# Patient Record
Sex: FEMALE | Race: WHITE | Employment: OTHER | URBAN - METROPOLITAN AREA
[De-identification: names, ages, dates, MRNs, and addresses within clinical notes are randomized per-mention and may not be internally consistent; named-entity substitution may affect disease eponyms.]

---

## 2020-03-09 ENCOUNTER — OP HISTORICAL/CONVERTED ENCOUNTER (OUTPATIENT)
Dept: OTHER | Age: 70
End: 2020-03-09

## 2020-06-15 ENCOUNTER — HOSPITAL ENCOUNTER (OUTPATIENT)
Dept: VASCULAR SURGERY | Age: 70
Discharge: HOME | End: 2020-06-15
Attending: FAMILY MEDICINE
Payer: MEDICARE

## 2020-06-15 ENCOUNTER — HOSPITAL ENCOUNTER (OUTPATIENT)
Dept: MRI IMAGING | Age: 70
Discharge: HOME | End: 2020-06-15
Attending: FAMILY MEDICINE
Payer: MEDICARE

## 2020-06-15 VITALS — WEIGHT: 152 LBS

## 2020-06-15 DIAGNOSIS — I63.89 OTHER CEREBRAL INFARCTION (HCC): ICD-10-CM

## 2020-06-15 PROCEDURE — 93880 EXTRACRANIAL BILAT STUDY: CPT

## 2020-06-15 PROCEDURE — A9575 INJ GADOTERATE MEGLUMI 0.1ML: HCPCS | Performed by: RADIOLOGY

## 2020-06-15 PROCEDURE — 70553 MRI BRAIN STEM W/O & W/DYE: CPT

## 2020-06-15 PROCEDURE — 74011250636 HC RX REV CODE- 250/636: Performed by: RADIOLOGY

## 2020-06-15 RX ORDER — GADOTERATE MEGLUMINE 376.9 MG/ML
13 INJECTION INTRAVENOUS
Status: COMPLETED | OUTPATIENT
Start: 2020-06-15 | End: 2020-06-15

## 2020-06-15 RX ADMIN — GADOTERATE MEGLUMINE 13 ML: 376.9 INJECTION INTRAVENOUS at 14:12

## 2020-06-16 LAB
LEFT CCA DIST DIAS: 23 CM/S
LEFT CCA DIST SYS: 102 CM/S
LEFT CCA PROX DIAS: 21.1 CM/S
LEFT CCA PROX SYS: 108.5 CM/S
LEFT ECA DIAS: 9.06 CM/S
LEFT ECA SYS: 69.5 CM/S
LEFT ICA DIST DIAS: 24.1 CM/S
LEFT ICA DIST SYS: 72.5 CM/S
LEFT ICA MID DIAS: 30 CM/S
LEFT ICA MID SYS: 88.8 CM/S
LEFT ICA PROX DIAS: 18.5 CM/S
LEFT ICA PROX SYS: 105.7 CM/S
LEFT ICA/CCA SYS: 1.04
LEFT SUBCLAVIAN DIAS: 0 CM/S
LEFT SUBCLAVIAN SYS: 126.2 CM/S
LEFT VERTEBRAL DIAS: 0 CM/S
LEFT VERTEBRAL SYS: 43.4 CM/S
RIGHT CCA DIST DIAS: 19.2 CM/S
RIGHT CCA DIST SYS: 101.6 CM/S
RIGHT CCA PROX DIAS: 21.5 CM/S
RIGHT CCA PROX SYS: 86.5 CM/S
RIGHT ECA DIAS: 7.91 CM/S
RIGHT ECA SYS: 104.8 CM/S
RIGHT ICA DIST DIAS: 33.3 CM/S
RIGHT ICA DIST SYS: 86.8 CM/S
RIGHT ICA MID DIAS: 20 CM/S
RIGHT ICA MID SYS: 53 CM/S
RIGHT ICA PROX DIAS: 17.6 CM/S
RIGHT ICA PROX SYS: 80.6 CM/S
RIGHT ICA/CCA SYS: 0.9
RIGHT SUBCLAVIAN DIAS: 0 CM/S
RIGHT SUBCLAVIAN SYS: 118.1 CM/S
RIGHT VERTEBRAL DIAS: 15.99 CM/S
RIGHT VERTEBRAL SYS: 58 CM/S

## 2021-08-03 ENCOUNTER — TRANSCRIBE ORDER (OUTPATIENT)
Dept: SCHEDULING | Age: 71
End: 2021-08-03

## 2021-08-03 DIAGNOSIS — E78.5 HYPERLIPEMIA: Primary | ICD-10-CM

## 2021-08-03 DIAGNOSIS — R79.89 HYPOURICEMIA: Primary | ICD-10-CM

## 2021-08-03 DIAGNOSIS — N18.31 CHRONIC KIDNEY DISEASE, STAGE 3A (HCC): Primary | ICD-10-CM

## 2021-08-12 ENCOUNTER — HOSPITAL ENCOUNTER (OUTPATIENT)
Dept: ULTRASOUND IMAGING | Age: 71
Discharge: HOME | End: 2021-08-12
Payer: MEDICARE

## 2021-08-12 DIAGNOSIS — N18.31 CHRONIC KIDNEY DISEASE, STAGE 3A (HCC): ICD-10-CM

## 2021-08-12 PROCEDURE — 76770 US EXAM ABDO BACK WALL COMP: CPT

## 2021-08-18 ENCOUNTER — TRANSCRIBE ORDER (OUTPATIENT)
Dept: REGISTRATION | Age: 71
End: 2021-08-18

## 2021-08-18 ENCOUNTER — HOSPITAL ENCOUNTER (OUTPATIENT)
Dept: GENERAL RADIOLOGY | Age: 71
Discharge: HOME | End: 2021-08-18
Payer: MEDICARE

## 2021-08-18 DIAGNOSIS — R06.02 SHORTNESS OF BREATH: Primary | ICD-10-CM

## 2021-08-18 DIAGNOSIS — R06.02 SHORTNESS OF BREATH: ICD-10-CM

## 2021-08-18 PROCEDURE — 71046 X-RAY EXAM CHEST 2 VIEWS: CPT

## 2021-08-19 ENCOUNTER — TRANSCRIBE ORDER (OUTPATIENT)
Dept: SCHEDULING | Age: 71
End: 2021-08-19

## 2021-08-19 DIAGNOSIS — R06.02 SHORTNESS OF BREATH: ICD-10-CM

## 2021-08-19 DIAGNOSIS — R06.2 WHEEZING: Primary | ICD-10-CM

## 2021-08-25 ENCOUNTER — TRANSCRIBE ORDER (OUTPATIENT)
Dept: SCHEDULING | Age: 71
End: 2021-08-25

## 2021-08-25 DIAGNOSIS — R91.8 LUNG MASS: Primary | ICD-10-CM

## 2021-08-26 ENCOUNTER — HOSPITAL ENCOUNTER (OUTPATIENT)
Dept: PULMONOLOGY | Age: 71
Discharge: HOME | End: 2021-08-26
Attending: NURSE PRACTITIONER
Payer: MEDICARE

## 2021-08-26 VITALS — OXYGEN SATURATION: 94 %

## 2021-08-26 DIAGNOSIS — R06.2 WHEEZING: ICD-10-CM

## 2021-08-26 DIAGNOSIS — R06.02 SHORTNESS OF BREATH: ICD-10-CM

## 2021-08-26 PROCEDURE — 74011250637 HC RX REV CODE- 250/637: Performed by: INTERNAL MEDICINE

## 2021-08-26 PROCEDURE — 94664 DEMO&/EVAL PT USE INHALER: CPT

## 2021-08-26 PROCEDURE — 94760 N-INVAS EAR/PLS OXIMETRY 1: CPT

## 2021-08-26 PROCEDURE — 94729 DIFFUSING CAPACITY: CPT

## 2021-08-26 PROCEDURE — 94640 AIRWAY INHALATION TREATMENT: CPT

## 2021-08-26 PROCEDURE — 94060 EVALUATION OF WHEEZING: CPT

## 2021-08-26 RX ORDER — ALBUTEROL SULFATE 90 UG/1
4 AEROSOL, METERED RESPIRATORY (INHALATION)
Status: COMPLETED | OUTPATIENT
Start: 2021-08-26 | End: 2021-08-26

## 2021-08-26 RX ADMIN — ALBUTEROL SULFATE 4 PUFF: 108 INHALANT RESPIRATORY (INHALATION) at 11:20

## 2021-08-26 NOTE — PROCEDURES
[facility] 80  PULMONARY FUNCTION TEST    Name:  Tere Velarde  MR#:  [id number]  :  1950  ACCOUNT #:  [de-identified]  DATE OF SERVICE:  2021    Spirometry reveals an FEV1-to-FVC ratio that is normal at 77% predicted. FVC and FEV1 are within normal limits with an FVC of 2.03 L and 90% predicted; FEV1 of 1.56 L and 100% predicted. This spirometry is not consistent with an obstructive or restrictive defect. Diffusion capacity is mildly reduced at 57% and corrects when taking into consideration alveolar volume to 94% predicted.       Nhung Goodwin MD      KP/NICOLAS_TED_I/  D:  2021 17:18  T:  2021 18:21  JOB #:  [id number]

## 2021-09-09 ENCOUNTER — HOSPITAL ENCOUNTER (OUTPATIENT)
Dept: CT IMAGING | Age: 71
Discharge: HOME | End: 2021-09-09
Payer: MEDICARE

## 2021-09-09 ENCOUNTER — TRANSCRIBE ORDER (OUTPATIENT)
Dept: REGISTRATION | Age: 71
End: 2021-09-09

## 2021-09-09 ENCOUNTER — HOSPITAL ENCOUNTER (OUTPATIENT)
Dept: LAB | Age: 71
Discharge: HOME | End: 2021-09-09
Payer: MEDICARE

## 2021-09-09 DIAGNOSIS — R91.8 LUNG MASS: Primary | ICD-10-CM

## 2021-09-09 DIAGNOSIS — R91.8 LUNG MASS: ICD-10-CM

## 2021-09-09 LAB — CREAT SERPL-MCNC: 1.37 MG/DL (ref 0.55–1.02)

## 2021-09-09 PROCEDURE — 82565 ASSAY OF CREATININE: CPT

## 2021-09-09 PROCEDURE — 74011000636 HC RX REV CODE- 636: Performed by: NURSE PRACTITIONER

## 2021-09-09 PROCEDURE — 71270 CT THORAX DX C-/C+: CPT

## 2021-09-09 PROCEDURE — 36415 COLL VENOUS BLD VENIPUNCTURE: CPT

## 2021-09-09 RX ADMIN — IOPAMIDOL 100 ML: 755 INJECTION, SOLUTION INTRAVENOUS at 13:44

## 2021-10-17 ENCOUNTER — HOSPITAL ENCOUNTER (OUTPATIENT)
Dept: PREADMISSION TESTING | Age: 71
Discharge: HOME | End: 2021-10-17
Payer: MEDICARE

## 2021-10-17 LAB — SARS-COV-2, COV2: NORMAL

## 2021-10-17 PROCEDURE — U0005 INFEC AGEN DETEC AMPLI PROBE: HCPCS

## 2021-10-18 LAB
SARS-COV-2, XPLCVT: NOT DETECTED
SOURCE, COVRS: NORMAL

## 2021-10-21 ENCOUNTER — APPOINTMENT (OUTPATIENT)
Dept: ENDOSCOPY | Age: 71
End: 2021-10-21
Attending: INTERNAL MEDICINE
Payer: MEDICARE

## 2021-10-21 ENCOUNTER — HOSPITAL ENCOUNTER (OUTPATIENT)
Age: 71
Setting detail: OUTPATIENT SURGERY
Discharge: HOME | End: 2021-10-21
Attending: INTERNAL MEDICINE | Admitting: INTERNAL MEDICINE
Payer: MEDICARE

## 2021-10-21 ENCOUNTER — ANESTHESIA EVENT (OUTPATIENT)
Dept: ENDOSCOPY | Age: 71
End: 2021-10-21
Payer: MEDICARE

## 2021-10-21 ENCOUNTER — ANESTHESIA (OUTPATIENT)
Dept: ENDOSCOPY | Age: 71
End: 2021-10-21
Payer: MEDICARE

## 2021-10-21 VITALS
SYSTOLIC BLOOD PRESSURE: 121 MMHG | DIASTOLIC BLOOD PRESSURE: 61 MMHG | OXYGEN SATURATION: 96 % | BODY MASS INDEX: 34.36 KG/M2 | RESPIRATION RATE: 16 BRPM | WEIGHT: 175 LBS | HEIGHT: 60 IN | TEMPERATURE: 97.6 F | HEART RATE: 70 BPM

## 2021-10-21 PROCEDURE — 77030037041 HC FCPS HOT BIOP ENDOSC RAD JAW DISP BSC -B: Performed by: INTERNAL MEDICINE

## 2021-10-21 PROCEDURE — 88305 TISSUE EXAM BY PATHOLOGIST: CPT

## 2021-10-21 PROCEDURE — 74011250636 HC RX REV CODE- 250/636: Performed by: ANESTHESIOLOGY

## 2021-10-21 PROCEDURE — 77030021593 HC FCPS BIOP ENDOSC BSC -A: Performed by: INTERNAL MEDICINE

## 2021-10-21 PROCEDURE — 76040000007: Performed by: INTERNAL MEDICINE

## 2021-10-21 PROCEDURE — 2709999900 HC NON-CHARGEABLE SUPPLY: Performed by: INTERNAL MEDICINE

## 2021-10-21 PROCEDURE — 76060000032 HC ANESTHESIA 0.5 TO 1 HR: Performed by: INTERNAL MEDICINE

## 2021-10-21 PROCEDURE — 74011250636 HC RX REV CODE- 250/636: Performed by: INTERNAL MEDICINE

## 2021-10-21 RX ORDER — SODIUM CHLORIDE, SODIUM LACTATE, POTASSIUM CHLORIDE, CALCIUM CHLORIDE 600; 310; 30; 20 MG/100ML; MG/100ML; MG/100ML; MG/100ML
INJECTION, SOLUTION INTRAVENOUS
Status: DISCONTINUED | OUTPATIENT
Start: 2021-10-21 | End: 2021-10-21 | Stop reason: HOSPADM

## 2021-10-21 RX ORDER — PROPOFOL 10 MG/ML
INJECTION, EMULSION INTRAVENOUS AS NEEDED
Status: DISCONTINUED | OUTPATIENT
Start: 2021-10-21 | End: 2021-10-21 | Stop reason: HOSPADM

## 2021-10-21 RX ORDER — SODIUM CHLORIDE, SODIUM LACTATE, POTASSIUM CHLORIDE, CALCIUM CHLORIDE 600; 310; 30; 20 MG/100ML; MG/100ML; MG/100ML; MG/100ML
75 INJECTION, SOLUTION INTRAVENOUS CONTINUOUS
Status: DISCONTINUED | OUTPATIENT
Start: 2021-10-21 | End: 2021-10-21 | Stop reason: HOSPADM

## 2021-10-21 RX ORDER — ASPIRIN 81 MG/1
81 TABLET ORAL DAILY
COMMUNITY

## 2021-10-21 RX ORDER — SODIUM CHLORIDE 9 MG/ML
25 INJECTION, SOLUTION INTRAVENOUS CONTINUOUS
Status: DISCONTINUED | OUTPATIENT
Start: 2021-10-21 | End: 2021-10-21 | Stop reason: HOSPADM

## 2021-10-21 RX ORDER — PROPOFOL 10 MG/ML
INJECTION, EMULSION INTRAVENOUS
Status: COMPLETED
Start: 2021-10-21 | End: 2021-10-21

## 2021-10-21 RX ORDER — ESCITALOPRAM OXALATE 20 MG/1
20 TABLET ORAL DAILY
COMMUNITY

## 2021-10-21 RX ORDER — TOFACITINIB 11 MG/1
1 TABLET, FILM COATED, EXTENDED RELEASE ORAL DAILY
COMMUNITY

## 2021-10-21 RX ADMIN — PROPOFOL 60 MG: 10 INJECTION, EMULSION INTRAVENOUS at 11:09

## 2021-10-21 RX ADMIN — SODIUM CHLORIDE, POTASSIUM CHLORIDE, SODIUM LACTATE AND CALCIUM CHLORIDE: 600; 310; 30; 20 INJECTION, SOLUTION INTRAVENOUS at 11:06

## 2021-10-21 RX ADMIN — SODIUM CHLORIDE 25 ML/HR: 9 INJECTION, SOLUTION INTRAVENOUS at 10:41

## 2021-10-21 RX ADMIN — PROPOFOL 50 MG: 10 INJECTION, EMULSION INTRAVENOUS at 11:08

## 2021-10-21 NOTE — ANESTHESIA POSTPROCEDURE EVALUATION
Procedure(s):  UPPER ENDOSCOPY (EGD). MAC, total IV anesthesia    Anesthesia Post Evaluation        Patient location during evaluation: bedside (Endoscopy suite)  Patient participation: complete - patient cannot participate  Level of consciousness: sleepy but conscious  Pain score: 0  Pain management: adequate  Airway patency: patent  Anesthetic complications: no  Cardiovascular status: acceptable  Respiratory status: acceptable and nasal cannula  Hydration status: acceptable  Comments: This patient remained on the stretcher. The patient was handed off to the endoscopy nursing team.  All questions regarding pre-, intra-, and postoperative care were answered.   Post anesthesia nausea and vomiting:  none      INITIAL Post-op Vital signs:   Vitals Value Taken Time   /73 10/21/21 1115   Temp     Pulse 65 10/21/21 1115   Resp 17 10/21/21 1115   SpO2 97 % 10/21/21 1115

## 2021-10-21 NOTE — PERIOP NOTES
Patient alert and oriented x 4 with respirations even and unlabored on RA. Patient discharged home per physician's order. Patient verbalizes understanding of discharge instructions. Patient transported via wheelchair to front hospital entrance with daughter present to transport patient via private vehicle.

## 2021-10-21 NOTE — DISCHARGE INSTRUCTIONS
Strict antireflux diet. Antireflux measures:  Small meals, no food after 7pm, walk 15 minutes after meals, avoid tobacco and alcohol. Continue present PPI therapy.

## 2021-11-11 ENCOUNTER — TRANSCRIBE ORDER (OUTPATIENT)
Dept: SCHEDULING | Age: 71
End: 2021-11-11

## 2021-11-11 DIAGNOSIS — Z12.31 SCREENING MAMMOGRAM FOR BREAST CANCER: Primary | ICD-10-CM

## 2021-11-12 ENCOUNTER — TRANSCRIBE ORDER (OUTPATIENT)
Dept: SCHEDULING | Age: 71
End: 2021-11-12

## 2021-11-12 DIAGNOSIS — Z12.31 SCREENING MAMMOGRAM FOR HIGH-RISK PATIENT: Primary | ICD-10-CM

## 2021-12-28 ENCOUNTER — HOSPITAL ENCOUNTER (OUTPATIENT)
Dept: MAMMOGRAPHY | Age: 71
Discharge: HOME | End: 2021-12-28
Payer: MEDICARE

## 2021-12-28 DIAGNOSIS — Z12.31 SCREENING MAMMOGRAM FOR HIGH-RISK PATIENT: ICD-10-CM

## 2021-12-28 PROCEDURE — 77067 SCR MAMMO BI INCL CAD: CPT

## 2022-03-21 ENCOUNTER — TRANSCRIBE ORDER (OUTPATIENT)
Dept: SCHEDULING | Age: 72
End: 2022-03-21

## 2022-03-21 DIAGNOSIS — R06.02 SHORTNESS OF BREATH: Primary | ICD-10-CM

## 2022-04-01 ENCOUNTER — HOSPITAL ENCOUNTER (OUTPATIENT)
Dept: NUCLEAR MEDICINE | Age: 72
Discharge: HOME | End: 2022-04-01
Attending: INTERNAL MEDICINE
Payer: MEDICARE

## 2022-04-01 ENCOUNTER — HOSPITAL ENCOUNTER (OUTPATIENT)
Dept: NON INVASIVE DIAGNOSTICS | Age: 72
Discharge: HOME | End: 2022-04-01
Attending: INTERNAL MEDICINE
Payer: MEDICARE

## 2022-04-01 VITALS
HEIGHT: 59 IN | WEIGHT: 175 LBS | BODY MASS INDEX: 35.28 KG/M2 | DIASTOLIC BLOOD PRESSURE: 67 MMHG | SYSTOLIC BLOOD PRESSURE: 118 MMHG

## 2022-04-01 DIAGNOSIS — R06.02 SHORTNESS OF BREATH: ICD-10-CM

## 2022-04-01 LAB
STRESS BASELINE DIAS BP: 67 MMHG
STRESS BASELINE HR: 67 BPM
STRESS BASELINE ST DEPRESSION: 0 MM
STRESS BASELINE SYS BP: 118 MMHG
STRESS PERCENT HR ACHIEVED: 85 %
STRESS POST PEAK HR: 128 BPM
STRESS ST DEPRESSION: 0 MM
STRESS STAGE 1 BP: NORMAL MMHG
STRESS STAGE 1 DURATION: NORMAL MIN:SEC
STRESS STAGE 1 HR: 90 BPM
STRESS STAGE 2 DURATION: NORMAL MIN:SEC
STRESS STAGE 2 HR: 94 BPM
STRESS STAGE 3 BP: NORMAL MMHG
STRESS STAGE 3 DURATION: NORMAL MIN:SEC
STRESS STAGE 3 HR: 104 BPM
STRESS STAGE 4 DURATION: NORMAL MIN:SEC
STRESS STAGE 4 HR: 100 BPM
STRESS STAGE 5 BP: NORMAL MMHG
STRESS STAGE 5 DURATION: NORMAL MIN:SEC
STRESS STAGE 5 HR: 89 BPM
STRESS TARGET HR: 150 BPM

## 2022-04-01 PROCEDURE — 74011250636 HC RX REV CODE- 250/636

## 2022-04-01 PROCEDURE — 93017 CV STRESS TEST TRACING ONLY: CPT

## 2022-04-01 RX ADMIN — REGADENOSON 0.4 MG: 0.08 INJECTION, SOLUTION INTRAVENOUS at 09:35

## 2022-04-18 ENCOUNTER — TRANSCRIBE ORDER (OUTPATIENT)
Dept: REGISTRATION | Age: 72
End: 2022-04-18

## 2022-04-18 ENCOUNTER — HOSPITAL ENCOUNTER (OUTPATIENT)
Dept: GENERAL RADIOLOGY | Age: 72
Discharge: HOME | End: 2022-04-18
Payer: MEDICARE

## 2022-04-18 ENCOUNTER — HOSPITAL ENCOUNTER (OUTPATIENT)
Dept: LAB | Age: 72
Discharge: HOME | End: 2022-04-18
Payer: MEDICARE

## 2022-04-18 DIAGNOSIS — R06.09 CHRONIC DYSPNEA: ICD-10-CM

## 2022-04-18 DIAGNOSIS — I10 ESSENTIAL HYPERTENSION, MALIGNANT: Primary | ICD-10-CM

## 2022-04-18 DIAGNOSIS — R06.09 CHRONIC DYSPNEA: Primary | ICD-10-CM

## 2022-04-18 DIAGNOSIS — I10 ESSENTIAL HYPERTENSION, MALIGNANT: ICD-10-CM

## 2022-04-18 PROCEDURE — 71046 X-RAY EXAM CHEST 2 VIEWS: CPT

## 2022-07-09 NOTE — ANESTHESIA PREPROCEDURE EVALUATION
Relevant Problems   No relevant active problems       Anesthetic History   No history of anesthetic complications            Review of Systems / Medical History  Patient summary reviewed, nursing notes reviewed and pertinent labs reviewed    Pulmonary                   Neuro/Psych         Psychiatric history (depression)     Cardiovascular              Hyperlipidemia         GI/Hepatic/Renal     GERD    Renal disease: CRI       Endo/Other        Obesity and arthritis     Other Findings              Physical Exam    Airway  Mallampati: II  TM Distance: < 4 cm  Neck ROM: normal range of motion   Mouth opening: Normal     Cardiovascular    Rhythm: regular  Rate: normal         Dental    Dentition: Lower dentition intact and Upper dentition intact     Pulmonary      Decreased breath sounds           Abdominal  GI exam deferred       Other Findings            Anesthetic Plan    ASA: 2  Anesthesia type: MAC and total IV anesthesia          Induction: Intravenous  Anesthetic plan and risks discussed with: Patient
Patent

## 2022-09-14 ENCOUNTER — TRANSCRIBE ORDER (OUTPATIENT)
Dept: SCHEDULING | Age: 72
End: 2022-09-14

## 2022-09-14 DIAGNOSIS — M54.16 LUMBAR RADICULOPATHY: Primary | ICD-10-CM

## 2022-09-21 ENCOUNTER — HOSPITAL ENCOUNTER (OUTPATIENT)
Dept: MRI IMAGING | Age: 72
Discharge: HOME | End: 2022-09-21
Payer: MEDICARE

## 2022-09-21 DIAGNOSIS — M54.16 LUMBAR RADICULOPATHY: ICD-10-CM

## 2022-09-21 PROCEDURE — 72148 MRI LUMBAR SPINE W/O DYE: CPT

## 2023-02-09 ENCOUNTER — TRANSCRIBE ORDER (OUTPATIENT)
Dept: SCHEDULING | Age: 73
End: 2023-02-09

## 2023-02-09 DIAGNOSIS — Z12.31 SCREENING MAMMOGRAM FOR HIGH-RISK PATIENT: Primary | ICD-10-CM

## 2023-02-15 ENCOUNTER — HOSPITAL ENCOUNTER (OUTPATIENT)
Dept: MAMMOGRAPHY | Age: 73
Discharge: HOME | End: 2023-02-15
Payer: MEDICARE

## 2023-02-15 DIAGNOSIS — Z12.31 SCREENING MAMMOGRAM FOR HIGH-RISK PATIENT: ICD-10-CM

## 2023-02-15 PROCEDURE — 77063 BREAST TOMOSYNTHESIS BI: CPT

## 2024-02-12 ENCOUNTER — TRANSCRIBE ORDERS (OUTPATIENT)
Facility: HOSPITAL | Age: 74
End: 2024-02-12

## 2024-02-12 DIAGNOSIS — R19.00 PELVIC LUMP: Primary | ICD-10-CM

## 2024-02-15 ENCOUNTER — TRANSCRIBE ORDERS (OUTPATIENT)
Facility: HOSPITAL | Age: 74
End: 2024-02-15

## 2024-02-15 DIAGNOSIS — Z12.31 SCREENING MAMMOGRAM FOR BREAST CANCER: Primary | ICD-10-CM

## 2024-02-23 ENCOUNTER — HOSPITAL ENCOUNTER (OUTPATIENT)
Facility: HOSPITAL | Age: 74
End: 2024-02-23
Payer: MEDICARE

## 2024-02-23 VITALS — BODY MASS INDEX: 35.28 KG/M2 | HEIGHT: 59 IN | WEIGHT: 175 LBS

## 2024-02-23 DIAGNOSIS — Z12.31 SCREENING MAMMOGRAM FOR BREAST CANCER: ICD-10-CM

## 2024-02-23 PROCEDURE — 77063 BREAST TOMOSYNTHESIS BI: CPT

## 2024-03-21 ENCOUNTER — HOSPITAL ENCOUNTER (OUTPATIENT)
Facility: HOSPITAL | Age: 74
Discharge: HOME | End: 2024-03-21
Payer: MEDICARE

## 2024-03-21 DIAGNOSIS — M54.16 LUMBAR RADICULOPATHY: ICD-10-CM

## 2024-03-21 PROCEDURE — A9577 INJ MULTIHANCE: HCPCS | Performed by: ORTHOPAEDIC SURGERY

## 2024-03-21 PROCEDURE — 6360000004 HC RX CONTRAST MEDICATION: Performed by: ORTHOPAEDIC SURGERY

## 2024-03-21 PROCEDURE — 72158 MRI LUMBAR SPINE W/O & W/DYE: CPT

## 2024-03-21 RX ADMIN — GADOBENATE DIMEGLUMINE 15 ML: 529 INJECTION, SOLUTION INTRAVENOUS at 09:17

## 2024-04-19 NOTE — PRE-PROCEDURE INSTRUCTIONS
Attempted to complete pat for procedure on 4/22/24. No answer. Detailed voicemail left regarding arrival time of 0915, npo status, prep instructions, and the need for a ride home.

## 2024-04-21 ENCOUNTER — ANESTHESIA EVENT (OUTPATIENT)
Facility: HOSPITAL | Age: 74
End: 2024-04-21
Payer: MEDICARE

## 2024-04-21 NOTE — ANESTHESIA PRE PROCEDURE
Department of Anesthesiology  Preprocedure Note       Name:  Britta Quevedo   Age:  74 y.o.  :  1950                                          MRN:  [id number]         Date:  2024      Surgeon: Surgeon(s):  Julian Pratt MD    Procedure: Procedure(s):  COLONOSCOPY    Medications prior to admission:   Prior to Admission medications    Medication Sig Start Date End Date Taking? Authorizing Provider   aspirin 81 MG EC tablet Take 81 mg by mouth daily    Automatic Reconciliation, Ar   escitalopram (LEXAPRO) 20 MG tablet Take 20 mg by mouth daily    Automatic Reconciliation, Ar   Tofacitinib Citrate ER (XELJANZ XR) 11 MG TB24 Take 1 tablet by mouth daily    Automatic Reconciliation, Ar       Current medications:    No current facility-administered medications for this encounter.     Current Outpatient Medications   Medication Sig Dispense Refill   • aspirin 81 MG EC tablet Take 81 mg by mouth daily     • escitalopram (LEXAPRO) 20 MG tablet Take 20 mg by mouth daily     • Tofacitinib Citrate ER (XELJANZ XR) 11 MG TB24 Take 1 tablet by mouth daily         Allergies:    Allergies   Allergen Reactions   • Meperidine      Other reaction(s): Unknown (comments)   • Sulfa Antibiotics      Other reaction(s): Unknown (comments)       Problem List:  There is no problem list on file for this patient.      Past Medical History:        Diagnosis Date   • Arthritis    • Chronic kidney disease    • Depression    • Hyperlipemia        Past Surgical History:        Procedure Laterality Date   • BACK SURGERY     • HYSTERECTOMY (CERVIX STATUS UNKNOWN)         Social History:    Social History     Tobacco Use   • Smoking status: Never   • Smokeless tobacco: Never   Substance Use Topics   • Alcohol use: Never                                Counseling given: Not Answered      Vital Signs (Current): There were no vitals filed for this visit.                                           BP Readings from Last 3 Encounters:   No data found

## 2024-04-22 ENCOUNTER — APPOINTMENT (OUTPATIENT)
Facility: HOSPITAL | Age: 74
DRG: 330 | End: 2024-04-22
Attending: INTERNAL MEDICINE
Payer: MEDICARE

## 2024-04-22 ENCOUNTER — ANESTHESIA (OUTPATIENT)
Facility: HOSPITAL | Age: 74
End: 2024-04-22
Payer: MEDICARE

## 2024-04-22 ENCOUNTER — HOSPITAL ENCOUNTER (INPATIENT)
Facility: HOSPITAL | Age: 74
LOS: 9 days | Discharge: HOME HEALTH | DRG: 330 | End: 2024-05-01
Attending: INTERNAL MEDICINE | Admitting: FAMILY MEDICINE
Payer: MEDICARE

## 2024-04-22 ENCOUNTER — ANESTHESIA EVENT (OUTPATIENT)
Facility: HOSPITAL | Age: 74
End: 2024-04-22
Payer: MEDICARE

## 2024-04-22 DIAGNOSIS — K66.8 FREE INTRAPERITONEAL AIR: ICD-10-CM

## 2024-04-22 PROBLEM — K63.1 PERFORATION BOWEL (HCC): Status: ACTIVE | Noted: 2024-04-22

## 2024-04-22 PROBLEM — K63.1 BOWEL PERFORATION (HCC): Status: ACTIVE | Noted: 2024-04-22

## 2024-04-22 PROCEDURE — 2500000003 HC RX 250 WO HCPCS: Performed by: NURSE PRACTITIONER

## 2024-04-22 PROCEDURE — 7100000000 HC PACU RECOVERY - FIRST 15 MIN: Performed by: SURGERY

## 2024-04-22 PROCEDURE — 0DN84ZZ RELEASE SMALL INTESTINE, PERCUTANEOUS ENDOSCOPIC APPROACH: ICD-10-PCS | Performed by: SURGERY

## 2024-04-22 PROCEDURE — 3600000004 HC SURGERY LEVEL 4 BASE: Performed by: SURGERY

## 2024-04-22 PROCEDURE — 3700000001 HC ADD 15 MINUTES (ANESTHESIA): Performed by: SURGERY

## 2024-04-22 PROCEDURE — 2580000003 HC RX 258: Performed by: NURSE PRACTITIONER

## 2024-04-22 PROCEDURE — 88307 TISSUE EXAM BY PATHOLOGIST: CPT

## 2024-04-22 PROCEDURE — 2580000003 HC RX 258: Performed by: INTERNAL MEDICINE

## 2024-04-22 PROCEDURE — 0D1M0Z4 BYPASS DESCENDING COLON TO CUTANEOUS, OPEN APPROACH: ICD-10-PCS | Performed by: SURGERY

## 2024-04-22 PROCEDURE — 6360000002 HC RX W HCPCS: Performed by: INTERNAL MEDICINE

## 2024-04-22 PROCEDURE — 74019 RADEX ABDOMEN 2 VIEWS: CPT

## 2024-04-22 PROCEDURE — 2580000003 HC RX 258: Performed by: SURGERY

## 2024-04-22 PROCEDURE — 99222 1ST HOSP IP/OBS MODERATE 55: CPT | Performed by: SURGERY

## 2024-04-22 PROCEDURE — 6360000002 HC RX W HCPCS: Performed by: NURSE PRACTITIONER

## 2024-04-22 PROCEDURE — 3700000001 HC ADD 15 MINUTES (ANESTHESIA): Performed by: INTERNAL MEDICINE

## 2024-04-22 PROCEDURE — 2500000003 HC RX 250 WO HCPCS: Performed by: NURSE ANESTHETIST, CERTIFIED REGISTERED

## 2024-04-22 PROCEDURE — 2709999900 HC NON-CHARGEABLE SUPPLY: Performed by: INTERNAL MEDICINE

## 2024-04-22 PROCEDURE — 2500000003 HC RX 250 WO HCPCS

## 2024-04-22 PROCEDURE — 71045 X-RAY EXAM CHEST 1 VIEW: CPT

## 2024-04-22 PROCEDURE — 2720000010 HC SURG SUPPLY STERILE: Performed by: SURGERY

## 2024-04-22 PROCEDURE — 3600000014 HC SURGERY LEVEL 4 ADDTL 15MIN: Performed by: SURGERY

## 2024-04-22 PROCEDURE — 7100000011 HC PHASE II RECOVERY - ADDTL 15 MIN: Performed by: INTERNAL MEDICINE

## 2024-04-22 PROCEDURE — 3600007502: Performed by: INTERNAL MEDICINE

## 2024-04-22 PROCEDURE — 2500000003 HC RX 250 WO HCPCS: Performed by: SURGERY

## 2024-04-22 PROCEDURE — 2709999900 HC NON-CHARGEABLE SUPPLY: Performed by: SURGERY

## 2024-04-22 PROCEDURE — 0DTN0ZZ RESECTION OF SIGMOID COLON, OPEN APPROACH: ICD-10-PCS | Performed by: SURGERY

## 2024-04-22 PROCEDURE — 1100000000 HC RM PRIVATE

## 2024-04-22 PROCEDURE — 2580000003 HC RX 258

## 2024-04-22 PROCEDURE — 6360000002 HC RX W HCPCS: Performed by: NURSE ANESTHETIST, CERTIFIED REGISTERED

## 2024-04-22 PROCEDURE — 3700000000 HC ANESTHESIA ATTENDED CARE: Performed by: SURGERY

## 2024-04-22 PROCEDURE — 3600007512: Performed by: INTERNAL MEDICINE

## 2024-04-22 PROCEDURE — 7100000010 HC PHASE II RECOVERY - FIRST 15 MIN: Performed by: INTERNAL MEDICINE

## 2024-04-22 PROCEDURE — 6360000002 HC RX W HCPCS

## 2024-04-22 PROCEDURE — 3700000000 HC ANESTHESIA ATTENDED CARE: Performed by: INTERNAL MEDICINE

## 2024-04-22 PROCEDURE — 44143 PARTIAL REMOVAL OF COLON: CPT | Performed by: SURGERY

## 2024-04-22 PROCEDURE — 74176 CT ABD & PELVIS W/O CONTRAST: CPT

## 2024-04-22 PROCEDURE — 7100000001 HC PACU RECOVERY - ADDTL 15 MIN: Performed by: SURGERY

## 2024-04-22 PROCEDURE — 87086 URINE CULTURE/COLONY COUNT: CPT

## 2024-04-22 RX ORDER — PHENYLEPHRINE HCL IN 0.9% NACL 1 MG/10 ML
SYRINGE (ML) INTRAVENOUS PRN
Status: DISCONTINUED | OUTPATIENT
Start: 2024-04-22 | End: 2024-04-22 | Stop reason: SDUPTHER

## 2024-04-22 RX ORDER — SODIUM CHLORIDE 0.9 % (FLUSH) 0.9 %
5-40 SYRINGE (ML) INJECTION EVERY 12 HOURS SCHEDULED
Status: DISCONTINUED | OUTPATIENT
Start: 2024-04-22 | End: 2024-05-01 | Stop reason: HOSPADM

## 2024-04-22 RX ORDER — ONDANSETRON 2 MG/ML
4 INJECTION INTRAMUSCULAR; INTRAVENOUS EVERY 6 HOURS PRN
Status: DISCONTINUED | OUTPATIENT
Start: 2024-04-22 | End: 2024-05-01 | Stop reason: HOSPADM

## 2024-04-22 RX ORDER — SUCCINYLCHOLINE/SOD CL,ISO/PF 200MG/10ML
SYRINGE (ML) INTRAVENOUS PRN
Status: DISCONTINUED | OUTPATIENT
Start: 2024-04-22 | End: 2024-04-22 | Stop reason: SDUPTHER

## 2024-04-22 RX ORDER — SODIUM CHLORIDE, SODIUM LACTATE, POTASSIUM CHLORIDE, CALCIUM CHLORIDE 600; 310; 30; 20 MG/100ML; MG/100ML; MG/100ML; MG/100ML
INJECTION, SOLUTION INTRAVENOUS CONTINUOUS PRN
Status: DISCONTINUED | OUTPATIENT
Start: 2024-04-22 | End: 2024-04-22

## 2024-04-22 RX ORDER — EPHEDRINE SULFATE 50 MG/ML
INJECTION INTRAVENOUS PRN
Status: DISCONTINUED | OUTPATIENT
Start: 2024-04-22 | End: 2024-04-22 | Stop reason: SDUPTHER

## 2024-04-22 RX ORDER — ESCITALOPRAM OXALATE 10 MG/1
20 TABLET ORAL DAILY
Status: DISCONTINUED | OUTPATIENT
Start: 2024-04-23 | End: 2024-05-01 | Stop reason: HOSPADM

## 2024-04-22 RX ORDER — SODIUM CHLORIDE, SODIUM LACTATE, POTASSIUM CHLORIDE, CALCIUM CHLORIDE 600; 310; 30; 20 MG/100ML; MG/100ML; MG/100ML; MG/100ML
INJECTION, SOLUTION INTRAVENOUS CONTINUOUS
Status: DISCONTINUED | OUTPATIENT
Start: 2024-04-22 | End: 2024-04-22

## 2024-04-22 RX ORDER — LIDOCAINE HYDROCHLORIDE 20 MG/ML
INJECTION, SOLUTION EPIDURAL; INFILTRATION; INTRACAUDAL; PERINEURAL PRN
Status: DISCONTINUED | OUTPATIENT
Start: 2024-04-22 | End: 2024-04-22 | Stop reason: SDUPTHER

## 2024-04-22 RX ORDER — MAGNESIUM SULFATE IN WATER 40 MG/ML
2000 INJECTION, SOLUTION INTRAVENOUS PRN
Status: DISCONTINUED | OUTPATIENT
Start: 2024-04-22 | End: 2024-05-01 | Stop reason: HOSPADM

## 2024-04-22 RX ORDER — HYDRALAZINE HYDROCHLORIDE 20 MG/ML
10 INJECTION INTRAMUSCULAR; INTRAVENOUS
Status: DISCONTINUED | OUTPATIENT
Start: 2024-04-22 | End: 2024-04-22 | Stop reason: HOSPADM

## 2024-04-22 RX ORDER — ACETAMINOPHEN 650 MG/1
650 SUPPOSITORY RECTAL EVERY 6 HOURS PRN
Status: DISCONTINUED | OUTPATIENT
Start: 2024-04-22 | End: 2024-05-01 | Stop reason: HOSPADM

## 2024-04-22 RX ORDER — PROPOFOL 10 MG/ML
INJECTION, EMULSION INTRAVENOUS CONTINUOUS PRN
Status: DISCONTINUED | OUTPATIENT
Start: 2024-04-22 | End: 2024-04-22 | Stop reason: SDUPTHER

## 2024-04-22 RX ORDER — SODIUM CHLORIDE 0.9 % (FLUSH) 0.9 %
5-40 SYRINGE (ML) INJECTION PRN
Status: DISCONTINUED | OUTPATIENT
Start: 2024-04-22 | End: 2024-04-22 | Stop reason: HOSPADM

## 2024-04-22 RX ORDER — DEXTROSE MONOHYDRATE 100 MG/ML
INJECTION, SOLUTION INTRAVENOUS CONTINUOUS PRN
Status: DISCONTINUED | OUTPATIENT
Start: 2024-04-22 | End: 2024-04-22 | Stop reason: HOSPADM

## 2024-04-22 RX ORDER — FENTANYL CITRATE 50 UG/ML
50 INJECTION, SOLUTION INTRAMUSCULAR; INTRAVENOUS ONCE
Status: COMPLETED | OUTPATIENT
Start: 2024-04-22 | End: 2024-04-22

## 2024-04-22 RX ORDER — POTASSIUM CHLORIDE 7.45 MG/ML
10 INJECTION INTRAVENOUS PRN
Status: DISCONTINUED | OUTPATIENT
Start: 2024-04-22 | End: 2024-05-01 | Stop reason: HOSPADM

## 2024-04-22 RX ORDER — FENTANYL CITRATE 50 UG/ML
50 INJECTION, SOLUTION INTRAMUSCULAR; INTRAVENOUS EVERY 5 MIN PRN
Status: DISCONTINUED | OUTPATIENT
Start: 2024-04-22 | End: 2024-04-22 | Stop reason: HOSPADM

## 2024-04-22 RX ORDER — ONDANSETRON 4 MG/1
4 TABLET, ORALLY DISINTEGRATING ORAL EVERY 8 HOURS PRN
Status: DISCONTINUED | OUTPATIENT
Start: 2024-04-22 | End: 2024-05-01 | Stop reason: HOSPADM

## 2024-04-22 RX ORDER — METOCLOPRAMIDE HYDROCHLORIDE 5 MG/ML
10 INJECTION INTRAMUSCULAR; INTRAVENOUS
Status: DISCONTINUED | OUTPATIENT
Start: 2024-04-22 | End: 2024-04-22 | Stop reason: HOSPADM

## 2024-04-22 RX ORDER — METRONIDAZOLE 500 MG/100ML
INJECTION, SOLUTION INTRAVENOUS PRN
Status: DISCONTINUED | OUTPATIENT
Start: 2024-04-22 | End: 2024-04-22 | Stop reason: SDUPTHER

## 2024-04-22 RX ORDER — HYDROMORPHONE HYDROCHLORIDE 1 MG/ML
1 INJECTION, SOLUTION INTRAMUSCULAR; INTRAVENOUS; SUBCUTANEOUS
Status: DISPENSED | OUTPATIENT
Start: 2024-04-22 | End: 2024-04-24

## 2024-04-22 RX ORDER — DEXAMETHASONE SODIUM PHOSPHATE 4 MG/ML
INJECTION, SOLUTION INTRA-ARTICULAR; INTRALESIONAL; INTRAMUSCULAR; INTRAVENOUS; SOFT TISSUE PRN
Status: DISCONTINUED | OUTPATIENT
Start: 2024-04-22 | End: 2024-04-22 | Stop reason: SDUPTHER

## 2024-04-22 RX ORDER — ACETAMINOPHEN 325 MG/1
650 TABLET ORAL EVERY 6 HOURS PRN
Status: DISCONTINUED | OUTPATIENT
Start: 2024-04-22 | End: 2024-05-01 | Stop reason: HOSPADM

## 2024-04-22 RX ORDER — SODIUM CHLORIDE 9 MG/ML
INJECTION, SOLUTION INTRAVENOUS PRN
Status: DISCONTINUED | OUTPATIENT
Start: 2024-04-22 | End: 2024-05-01 | Stop reason: HOSPADM

## 2024-04-22 RX ORDER — METRONIDAZOLE 500 MG/100ML
500 INJECTION, SOLUTION INTRAVENOUS EVERY 8 HOURS
Status: DISCONTINUED | OUTPATIENT
Start: 2024-04-22 | End: 2024-05-01 | Stop reason: HOSPADM

## 2024-04-22 RX ORDER — SODIUM CHLORIDE 9 MG/ML
INJECTION, SOLUTION INTRAVENOUS CONTINUOUS
Status: DISCONTINUED | OUTPATIENT
Start: 2024-04-22 | End: 2024-04-22

## 2024-04-22 RX ORDER — OXYCODONE HYDROCHLORIDE 5 MG/1
5 TABLET ORAL PRN
Status: DISCONTINUED | OUTPATIENT
Start: 2024-04-22 | End: 2024-04-22 | Stop reason: HOSPADM

## 2024-04-22 RX ORDER — LIDOCAINE 4 G/G
1 PATCH TOPICAL AS NEEDED
Status: DISCONTINUED | OUTPATIENT
Start: 2024-04-22 | End: 2024-04-22 | Stop reason: HOSPADM

## 2024-04-22 RX ORDER — LIDOCAINE HYDROCHLORIDE 10 MG/ML
INJECTION, SOLUTION EPIDURAL; INFILTRATION; INTRACAUDAL; PERINEURAL PRN
Status: DISCONTINUED | OUTPATIENT
Start: 2024-04-22 | End: 2024-04-22 | Stop reason: ALTCHOICE

## 2024-04-22 RX ORDER — SODIUM CHLORIDE, SODIUM LACTATE, POTASSIUM CHLORIDE, CALCIUM CHLORIDE 600; 310; 30; 20 MG/100ML; MG/100ML; MG/100ML; MG/100ML
INJECTION, SOLUTION INTRAVENOUS ONCE
Status: DISCONTINUED | OUTPATIENT
Start: 2024-04-22 | End: 2024-04-22 | Stop reason: HOSPADM

## 2024-04-22 RX ORDER — LISINOPRIL 10 MG/1
10 TABLET ORAL DAILY
Status: ON HOLD | COMMUNITY
End: 2024-05-01 | Stop reason: HOSPADM

## 2024-04-22 RX ORDER — SODIUM CHLORIDE 9 MG/ML
INJECTION, SOLUTION INTRAVENOUS CONTINUOUS PRN
Status: DISCONTINUED | OUTPATIENT
Start: 2024-04-22 | End: 2024-04-22 | Stop reason: SDUPTHER

## 2024-04-22 RX ORDER — PROPOFOL 10 MG/ML
INJECTION, EMULSION INTRAVENOUS PRN
Status: DISCONTINUED | OUTPATIENT
Start: 2024-04-22 | End: 2024-04-22 | Stop reason: SDUPTHER

## 2024-04-22 RX ORDER — OXYCODONE HYDROCHLORIDE 5 MG/1
10 TABLET ORAL PRN
Status: DISCONTINUED | OUTPATIENT
Start: 2024-04-22 | End: 2024-04-22 | Stop reason: HOSPADM

## 2024-04-22 RX ORDER — HYDROMORPHONE HYDROCHLORIDE 1 MG/ML
0.5 INJECTION, SOLUTION INTRAMUSCULAR; INTRAVENOUS; SUBCUTANEOUS
Status: DISPENSED | OUTPATIENT
Start: 2024-04-22 | End: 2024-04-24

## 2024-04-22 RX ORDER — HEPARIN SODIUM 5000 [USP'U]/ML
5000 INJECTION, SOLUTION INTRAVENOUS; SUBCUTANEOUS EVERY 8 HOURS SCHEDULED
Status: DISCONTINUED | OUTPATIENT
Start: 2024-04-22 | End: 2024-04-22

## 2024-04-22 RX ORDER — NALOXONE HYDROCHLORIDE 0.4 MG/ML
INJECTION, SOLUTION INTRAMUSCULAR; INTRAVENOUS; SUBCUTANEOUS PRN
Status: DISCONTINUED | OUTPATIENT
Start: 2024-04-22 | End: 2024-04-22 | Stop reason: HOSPADM

## 2024-04-22 RX ORDER — ROCURONIUM BROMIDE 10 MG/ML
INJECTION, SOLUTION INTRAVENOUS PRN
Status: DISCONTINUED | OUTPATIENT
Start: 2024-04-22 | End: 2024-04-22 | Stop reason: SDUPTHER

## 2024-04-22 RX ORDER — LABETALOL HYDROCHLORIDE 5 MG/ML
10 INJECTION, SOLUTION INTRAVENOUS EVERY 4 HOURS PRN
Status: DISCONTINUED | OUTPATIENT
Start: 2024-04-22 | End: 2024-05-01 | Stop reason: HOSPADM

## 2024-04-22 RX ORDER — POTASSIUM CHLORIDE 20 MEQ/1
40 TABLET, EXTENDED RELEASE ORAL PRN
Status: DISCONTINUED | OUTPATIENT
Start: 2024-04-22 | End: 2024-05-01 | Stop reason: HOSPADM

## 2024-04-22 RX ORDER — SODIUM CHLORIDE 0.9 % (FLUSH) 0.9 %
5-40 SYRINGE (ML) INJECTION PRN
Status: DISCONTINUED | OUTPATIENT
Start: 2024-04-22 | End: 2024-05-01 | Stop reason: HOSPADM

## 2024-04-22 RX ORDER — IPRATROPIUM BROMIDE AND ALBUTEROL SULFATE 2.5; .5 MG/3ML; MG/3ML
1 SOLUTION RESPIRATORY (INHALATION)
Status: DISCONTINUED | OUTPATIENT
Start: 2024-04-22 | End: 2024-04-22 | Stop reason: HOSPADM

## 2024-04-22 RX ORDER — SODIUM CHLORIDE 0.9 % (FLUSH) 0.9 %
5-40 SYRINGE (ML) INJECTION EVERY 12 HOURS SCHEDULED
Status: DISCONTINUED | OUTPATIENT
Start: 2024-04-22 | End: 2024-04-22 | Stop reason: HOSPADM

## 2024-04-22 RX ORDER — FENTANYL CITRATE 50 UG/ML
INJECTION, SOLUTION INTRAMUSCULAR; INTRAVENOUS PRN
Status: DISCONTINUED | OUTPATIENT
Start: 2024-04-22 | End: 2024-04-22 | Stop reason: SDUPTHER

## 2024-04-22 RX ORDER — LABETALOL HYDROCHLORIDE 5 MG/ML
10 INJECTION, SOLUTION INTRAVENOUS
Status: DISCONTINUED | OUTPATIENT
Start: 2024-04-22 | End: 2024-04-22 | Stop reason: HOSPADM

## 2024-04-22 RX ORDER — ONDANSETRON 2 MG/ML
INJECTION INTRAMUSCULAR; INTRAVENOUS PRN
Status: DISCONTINUED | OUTPATIENT
Start: 2024-04-22 | End: 2024-04-22 | Stop reason: SDUPTHER

## 2024-04-22 RX ORDER — CIPROFLOXACIN 2 MG/ML
400 INJECTION, SOLUTION INTRAVENOUS EVERY 12 HOURS
Status: DISCONTINUED | OUTPATIENT
Start: 2024-04-22 | End: 2024-05-01 | Stop reason: HOSPADM

## 2024-04-22 RX ORDER — HYDROMORPHONE HYDROCHLORIDE 1 MG/ML
0.5 INJECTION, SOLUTION INTRAMUSCULAR; INTRAVENOUS; SUBCUTANEOUS EVERY 5 MIN PRN
Status: DISCONTINUED | OUTPATIENT
Start: 2024-04-22 | End: 2024-04-22 | Stop reason: HOSPADM

## 2024-04-22 RX ORDER — ONDANSETRON 2 MG/ML
4 INJECTION INTRAMUSCULAR; INTRAVENOUS ONCE
Status: COMPLETED | OUTPATIENT
Start: 2024-04-22 | End: 2024-04-22

## 2024-04-22 RX ORDER — POLYETHYLENE GLYCOL 3350 17 G/17G
17 POWDER, FOR SOLUTION ORAL DAILY PRN
Status: DISCONTINUED | OUTPATIENT
Start: 2024-04-22 | End: 2024-05-01 | Stop reason: HOSPADM

## 2024-04-22 RX ORDER — ONDANSETRON 2 MG/ML
4 INJECTION INTRAMUSCULAR; INTRAVENOUS
Status: DISCONTINUED | OUTPATIENT
Start: 2024-04-22 | End: 2024-04-22 | Stop reason: HOSPADM

## 2024-04-22 RX ORDER — DIPHENHYDRAMINE HYDROCHLORIDE 50 MG/ML
12.5 INJECTION INTRAMUSCULAR; INTRAVENOUS
Status: DISCONTINUED | OUTPATIENT
Start: 2024-04-22 | End: 2024-04-22 | Stop reason: HOSPADM

## 2024-04-22 RX ORDER — GLUCAGON 1 MG/ML
1 KIT INJECTION PRN
Status: DISCONTINUED | OUTPATIENT
Start: 2024-04-22 | End: 2024-04-22 | Stop reason: HOSPADM

## 2024-04-22 RX ORDER — SODIUM CHLORIDE 9 MG/ML
INJECTION, SOLUTION INTRAVENOUS CONTINUOUS
Status: DISCONTINUED | OUTPATIENT
Start: 2024-04-22 | End: 2024-04-23

## 2024-04-22 RX ORDER — LISINOPRIL 10 MG/1
10 TABLET ORAL DAILY
Status: DISCONTINUED | OUTPATIENT
Start: 2024-04-23 | End: 2024-05-01 | Stop reason: HOSPADM

## 2024-04-22 RX ORDER — LORAZEPAM 2 MG/ML
0.5 INJECTION INTRAMUSCULAR
Status: DISCONTINUED | OUTPATIENT
Start: 2024-04-22 | End: 2024-04-22 | Stop reason: HOSPADM

## 2024-04-22 RX ORDER — SODIUM CHLORIDE 9 MG/ML
INJECTION, SOLUTION INTRAVENOUS PRN
Status: DISCONTINUED | OUTPATIENT
Start: 2024-04-22 | End: 2024-04-22 | Stop reason: HOSPADM

## 2024-04-22 RX ADMIN — FENTANYL CITRATE 25 MCG: 50 INJECTION, SOLUTION INTRAMUSCULAR; INTRAVENOUS at 18:17

## 2024-04-22 RX ADMIN — FENTANYL CITRATE 50 MCG: 50 INJECTION INTRAMUSCULAR; INTRAVENOUS at 11:55

## 2024-04-22 RX ADMIN — SUGAMMADEX 200 MG: 100 INJECTION, SOLUTION INTRAVENOUS at 19:20

## 2024-04-22 RX ADMIN — ROCURONIUM BROMIDE 10 MG: 10 INJECTION, SOLUTION INTRAVENOUS at 17:41

## 2024-04-22 RX ADMIN — ONDANSETRON 4 MG: 2 INJECTION INTRAMUSCULAR; INTRAVENOUS at 11:53

## 2024-04-22 RX ADMIN — FENTANYL CITRATE 50 MCG: 50 INJECTION, SOLUTION INTRAMUSCULAR; INTRAVENOUS at 18:27

## 2024-04-22 RX ADMIN — Medication 120 MG: at 17:41

## 2024-04-22 RX ADMIN — FENTANYL CITRATE 50 MCG: 50 INJECTION INTRAMUSCULAR; INTRAVENOUS at 13:50

## 2024-04-22 RX ADMIN — DEXAMETHASONE SODIUM PHOSPHATE 4 MG: 4 INJECTION, SOLUTION INTRA-ARTICULAR; INTRALESIONAL; INTRAMUSCULAR; INTRAVENOUS; SOFT TISSUE at 17:52

## 2024-04-22 RX ADMIN — SODIUM CHLORIDE: 9 INJECTION, SOLUTION INTRAVENOUS at 09:54

## 2024-04-22 RX ADMIN — LIDOCAINE HYDROCHLORIDE 40 MG: 20 INJECTION, SOLUTION EPIDURAL; INFILTRATION; INTRACAUDAL; PERINEURAL at 11:04

## 2024-04-22 RX ADMIN — PROPOFOL 20 MG: 10 INJECTION, EMULSION INTRAVENOUS at 11:05

## 2024-04-22 RX ADMIN — Medication 200 MCG: at 17:50

## 2024-04-22 RX ADMIN — Medication 100 MCG: at 11:15

## 2024-04-22 RX ADMIN — CEFAZOLIN SODIUM 2000 MG: 1 INJECTION, POWDER, FOR SOLUTION INTRAMUSCULAR; INTRAVENOUS at 17:39

## 2024-04-22 RX ADMIN — Medication 100 MCG: at 11:18

## 2024-04-22 RX ADMIN — EPHEDRINE SULFATE 5 MG: 50 INJECTION INTRAVENOUS at 18:10

## 2024-04-22 RX ADMIN — ROCURONIUM BROMIDE 20 MG: 10 INJECTION, SOLUTION INTRAVENOUS at 18:22

## 2024-04-22 RX ADMIN — HYDROMORPHONE HYDROCHLORIDE 0.5 MG: 1 INJECTION, SOLUTION INTRAMUSCULAR; INTRAVENOUS; SUBCUTANEOUS at 19:13

## 2024-04-22 RX ADMIN — LIDOCAINE HYDROCHLORIDE 100 MG: 20 INJECTION, SOLUTION EPIDURAL; INFILTRATION; INTRACAUDAL; PERINEURAL at 17:39

## 2024-04-22 RX ADMIN — EPHEDRINE SULFATE 5 MG: 50 INJECTION INTRAVENOUS at 11:18

## 2024-04-22 RX ADMIN — ROCURONIUM BROMIDE 20 MG: 10 INJECTION, SOLUTION INTRAVENOUS at 17:52

## 2024-04-22 RX ADMIN — PROPOFOL 100 MG: 10 INJECTION, EMULSION INTRAVENOUS at 17:41

## 2024-04-22 RX ADMIN — SODIUM CHLORIDE: 9 INJECTION, SOLUTION INTRAVENOUS at 10:47

## 2024-04-22 RX ADMIN — PROPOFOL 50 MG: 10 INJECTION, EMULSION INTRAVENOUS at 11:04

## 2024-04-22 RX ADMIN — PROPOFOL 20 MG: 10 INJECTION, EMULSION INTRAVENOUS at 11:07

## 2024-04-22 RX ADMIN — PROPOFOL 20 MG: 10 INJECTION, EMULSION INTRAVENOUS at 11:09

## 2024-04-22 RX ADMIN — ONDANSETRON 4 MG: 2 INJECTION INTRAMUSCULAR; INTRAVENOUS at 17:52

## 2024-04-22 RX ADMIN — SODIUM CHLORIDE: 9 INJECTION, SOLUTION INTRAVENOUS at 22:30

## 2024-04-22 RX ADMIN — PROPOFOL 50 MCG/KG/MIN: 10 INJECTION, EMULSION INTRAVENOUS at 17:46

## 2024-04-22 RX ADMIN — SODIUM CHLORIDE: 9 INJECTION, SOLUTION INTRAVENOUS at 18:24

## 2024-04-22 RX ADMIN — CEFAZOLIN 2000 MG: 1 INJECTION, POWDER, FOR SOLUTION INTRAMUSCULAR; INTRAVENOUS at 11:58

## 2024-04-22 RX ADMIN — FENTANYL CITRATE 25 MCG: 50 INJECTION, SOLUTION INTRAMUSCULAR; INTRAVENOUS at 17:39

## 2024-04-22 RX ADMIN — Medication 100 MCG: at 11:13

## 2024-04-22 RX ADMIN — METRONIDAZOLE 500 MG: 500 INJECTION, SOLUTION INTRAVENOUS at 17:57

## 2024-04-22 ASSESSMENT — PAIN - FUNCTIONAL ASSESSMENT
PAIN_FUNCTIONAL_ASSESSMENT: 0-10

## 2024-04-22 ASSESSMENT — PAIN SCALES - GENERAL
PAINLEVEL_OUTOF10: 0

## 2024-04-22 ASSESSMENT — PAIN DESCRIPTION - DESCRIPTORS
DESCRIPTORS: SORE
DESCRIPTORS: ACHING
DESCRIPTORS: ACHING

## 2024-04-22 NOTE — CONSULTS
General surgery history and Physical    Patient: Britta Quevedo  MRN: [id number]    YOB: 1950  Age: 74 y.o.  Sex: female     Chief Complaint:  No chief complaint on file.      History of Present Illness: Britta Quevedo is a 74 y.o. very pleasant woman underwent colonoscopic examination for diverticular disease.  I was notified to examine the patient for possible bowel perforation.  Patient examined at bedside.  Patient complain of severe pain.  Pain score 10 out of 10.  Patient is also nauseous.  On exam patient's heart rate is 120s.  Blood pressure stable.  Abdomen appears distended.  And generalized peritoneal signs noted.    Social History:  Social Connections: Not on file       Past Medical History:  Past Medical History:   Diagnosis Date    Arthritis     Chronic kidney disease     Depression     Endometriosis     Hyperlipemia      Surgical History:  Past Surgical History:   Procedure Laterality Date    BACK SURGERY      CHOLECYSTECTOMY      COLONOSCOPY      HYSTERECTOMY (CERVIX STATUS UNKNOWN)      TONSILLECTOMY         Allergies:  Allergies   Allergen Reactions    Meperidine      Other reaction(s): Unknown (comments)    Sulfa Antibiotics      Other reaction(s): Unknown (comments)       Current Meds:  Current Facility-Administered Medications   Medication Dose Route Frequency Provider Last Rate Last Admin    0.9 % sodium chloride infusion   IntraVENous Continuous Julian Pratt  mL/hr at 04/22/24 0954 New Bag at 04/22/24 0954    piperacillin-tazobactam (ZOSYN) 3,375 mg in sodium chloride 0.9 % 50 mL IVPB (mini-bag)  3,375 mg IntraVENous Q8H Julian Pratt MD           Review of Systems:  I reviewed the rest of organ systems personally and they are negative.  Pertinent findings discussed in HPI.    Physical Examination    /66   Pulse 94   Temp 98.4 °F (36.9 °C) (Oral)   Resp 16   Ht 1.499 m (4' 11\")   Wt 72.6 kg (160 lb)   SpO2 93%   BMI 32.32 kg/m²   Gen: Well developed, well

## 2024-04-22 NOTE — ANESTHESIA POSTPROCEDURE EVALUATION
Department of Anesthesiology  Postprocedure Note    Patient: Britta Quevedo  MRN: [id number]  YOB: 1950  Date of evaluation: 4/22/2024    Procedure Summary       Date: 04/22/24 Room / Location: [facility] ENDO 03 / [facility] ENDOSCOPY    Anesthesia Start: 1047 Anesthesia Stop: 1120    Procedure: COLONOSCOPY Diagnosis:       Abdominal pain, unspecified abdominal location      Change in bowel habits      Rectal bleeding      Abnormal virtual colonoscope      (Abdominal pain, unspecified abdominal location [R10.9])      (Change in bowel habits [R19.4])      (Rectal bleeding [K62.5])      (Abnormal virtual colonoscope [R93.3])    Surgeons: Julian Pratt MD Responsible Provider: Jasson Curtis MD    Anesthesia Type: MAC ASA Status: 3            Anesthesia Type: MAC    Vivi Phase I: Vivi Score: 10    Vivi Phase II:      Anesthesia Post Evaluation    Patient location during evaluation: bedside (Endoscopy Unit)  Patient participation: complete - patient participated  Level of consciousness: sleepy but conscious  Pain score: 0  Airway patency: patent  Nausea & Vomiting: no nausea and no vomiting  Cardiovascular status: hemodynamically stable  Respiratory status: acceptable  Hydration status: stable  Comments: This patient remained on the stretcher.  The patient was handed off to the endoscopy nursing team.  All questions regarding pre-, intra-, and postoperative care were answered.  Multimodal analgesia pain management approach    No notable events documented.

## 2024-04-22 NOTE — ANESTHESIA POSTPROCEDURE EVALUATION
Department of Anesthesiology  Postprocedure Note    Patient: Britta Quevedo  MRN: [id number]  YOB: 1950  Date of evaluation: 4/22/2024    Procedure Summary       Date: 04/22/24 Room / Location: [facility] MAIN OR 06 / [facility] MAIN OR    Anesthesia Start: 1737 Anesthesia Stop: 1939    Procedure: LAPAROSCOPY DIAGNOSTIC, PARTIAL SIGMOID COLECTOMY WITH COLOSTOMY CREATION (Abdomen) Diagnosis:       Free intraperitoneal air      (Free intraperitoneal air [K66.8])    Surgeons: Berto Yen MD Responsible Provider: Tonia Chinchilla MD    Anesthesia Type: general ASA Status: 3 - Emergent            Anesthesia Type: No value filed.    Vivi Phase I: Vivi Score: 10    Vivi Phase II: Vivi Score: 9    Anesthesia Post Evaluation    Patient location during evaluation: PACU  Patient participation: complete - patient participated  Level of consciousness: sleepy but conscious and awake  Airway patency: patent  Nausea & Vomiting: no nausea and no vomiting  Cardiovascular status: hemodynamically stable  Respiratory status: acceptable  Hydration status: euvolemic  Pain management: adequate    No notable events documented.

## 2024-04-22 NOTE — ANESTHESIA PRE PROCEDURE
Department of Anesthesiology  Preprocedure Note       Name:  Britta Quevedo   Age:  74 y.o.  :  1950                                          MRN:  [id number]         Date:  2024      Surgeon: Surgeon(s):  Berto Yen MD    Procedure: Procedure(s):  LAPAROSCOPY DIAGNOSTIC    Medications prior to admission:   Prior to Admission medications    Medication Sig Start Date End Date Taking? Authorizing Provider   lisinopril (PRINIVIL;ZESTRIL) 10 MG tablet Take 1 tablet by mouth daily   Yes Provider, MD Haleigh   aspirin 81 MG EC tablet Take 81 mg by mouth daily  Patient not taking: Reported on 2024    Automatic Reconciliation, Ar   escitalopram (LEXAPRO) 20 MG tablet Take 1 tablet by mouth daily    Automatic Reconciliation, Ar   Tofacitinib Citrate ER (XELJANZ XR) 11 MG TB24 Take 1 tablet by mouth daily    Automatic Reconciliation, Ar       Current medications:    Current Facility-Administered Medications   Medication Dose Route Frequency Provider Last Rate Last Admin   • 0.9 % sodium chloride infusion   IntraVENous Continuous Julian Pratt  mL/hr at 24 0954 New Bag at 24 0954   • piperacillin-tazobactam (ZOSYN) 3,375 mg in sodium chloride 0.9 % 50 mL IVPB (mini-bag)  3,375 mg IntraVENous Q8H Julian Pratt MD           Allergies:    Allergies   Allergen Reactions   • Meperidine      Other reaction(s): Unknown (comments)   • Sulfa Antibiotics      Other reaction(s): Unknown (comments)       Problem List:    Patient Active Problem List   Diagnosis Code   • Bowel perforation (HCC) K63.1       Past Medical History:        Diagnosis Date   • Arthritis    • Chronic kidney disease    • Depression    • Endometriosis    • Hyperlipemia        Past Surgical History:        Procedure Laterality Date   • BACK SURGERY     • CHOLECYSTECTOMY     • COLONOSCOPY     • HYSTERECTOMY (CERVIX STATUS UNKNOWN)     • TONSILLECTOMY         Social History:    Social History     Tobacco Use   • Smoking status:

## 2024-04-22 NOTE — PERIOP NOTE
1300: Called CT and they state they are ready for the patient if we could transport.  1310: pt transported to CT via stretcher with VERENICE Nicholson RN  1340: Pt back from CT. Pt states she is still in 10/10 pain. Dr. Pratt called and got orders for 50 fent. Pt resting quietly. Pt states she feels like she needs oxygen. O2 sats 100%.

## 2024-04-23 ENCOUNTER — APPOINTMENT (OUTPATIENT)
Facility: HOSPITAL | Age: 74
DRG: 330 | End: 2024-04-23
Attending: INTERNAL MEDICINE
Payer: MEDICARE

## 2024-04-23 LAB
ALBUMIN SERPL-MCNC: 3.3 G/DL (ref 3.5–5)
ALBUMIN SERPL-MCNC: 3.4 G/DL (ref 3.5–5)
ALBUMIN/GLOB SERPL: 1.1 (ref 1.1–2.2)
ALP SERPL-CCNC: 39 U/L (ref 45–117)
ALT SERPL-CCNC: 39 U/L (ref 12–78)
ANION GAP SERPL CALC-SCNC: 10 MMOL/L (ref 5–15)
ANION GAP SERPL CALC-SCNC: 9 MMOL/L (ref 5–15)
AST SERPL W P-5'-P-CCNC: 43 U/L (ref 15–37)
BASOPHILS # BLD: 0 K/UL (ref 0–0.1)
BASOPHILS NFR BLD: 0 % (ref 0–1)
BILIRUB SERPL-MCNC: 0.6 MG/DL (ref 0.2–1)
BNP SERPL-MCNC: 593 PG/ML
BUN SERPL-MCNC: 18 MG/DL (ref 6–20)
BUN SERPL-MCNC: 19 MG/DL (ref 6–20)
BUN/CREAT SERPL: 14 (ref 12–20)
BUN/CREAT SERPL: 15 (ref 12–20)
CA-I BLD-MCNC: 8.8 MG/DL (ref 8.5–10.1)
CA-I BLD-MCNC: 8.8 MG/DL (ref 8.5–10.1)
CHLORIDE SERPL-SCNC: 114 MMOL/L (ref 97–108)
CHLORIDE SERPL-SCNC: 115 MMOL/L (ref 97–108)
CO2 SERPL-SCNC: 22 MMOL/L (ref 21–32)
CO2 SERPL-SCNC: 22 MMOL/L (ref 21–32)
CREAT SERPL-MCNC: 1.24 MG/DL (ref 0.55–1.02)
CREAT SERPL-MCNC: 1.27 MG/DL (ref 0.55–1.02)
DIFFERENTIAL METHOD BLD: ABNORMAL
EOSINOPHIL # BLD: 0 K/UL (ref 0–0.4)
EOSINOPHIL NFR BLD: 0 % (ref 0–7)
ERYTHROCYTE [DISTWIDTH] IN BLOOD BY AUTOMATED COUNT: 13.5 % (ref 11.5–14.5)
GLOBULIN SER CALC-MCNC: 3 G/DL (ref 2–4)
GLUCOSE SERPL-MCNC: 172 MG/DL (ref 65–100)
GLUCOSE SERPL-MCNC: 175 MG/DL (ref 65–100)
HCT VFR BLD AUTO: 31.9 % (ref 35–47)
HGB BLD-MCNC: 10.6 G/DL (ref 11.5–16)
IMM GRANULOCYTES # BLD AUTO: 0 K/UL (ref 0–0.04)
IMM GRANULOCYTES NFR BLD AUTO: 0 % (ref 0–0.5)
LACTATE SERPL-SCNC: 2.2 MMOL/L (ref 0.4–2)
LYMPHOCYTES # BLD: 0.2 K/UL (ref 0.8–3.5)
LYMPHOCYTES NFR BLD: 2 % (ref 12–49)
MCH RBC QN AUTO: 30.8 PG (ref 26–34)
MCHC RBC AUTO-ENTMCNC: 33.2 G/DL (ref 30–36.5)
MCV RBC AUTO: 92.7 FL (ref 80–99)
MONOCYTES # BLD: 0.8 K/UL (ref 0–1)
MONOCYTES NFR BLD: 6 % (ref 5–13)
NEUTS SEG # BLD: 11 K/UL (ref 1.8–8)
NEUTS SEG NFR BLD: 92 % (ref 32–75)
NRBC # BLD: 0 K/UL (ref 0–0.01)
NRBC BLD-RTO: 0 PER 100 WBC
PHOSPHATE SERPL-MCNC: 2.4 MG/DL (ref 2.6–4.7)
PLATELET # BLD AUTO: 197 K/UL (ref 150–400)
PMV BLD AUTO: 10.4 FL (ref 8.9–12.9)
POTASSIUM SERPL-SCNC: 3.3 MMOL/L (ref 3.5–5.1)
POTASSIUM SERPL-SCNC: 3.4 MMOL/L (ref 3.5–5.1)
PROT SERPL-MCNC: 6.3 G/DL (ref 6.4–8.2)
RBC # BLD AUTO: 3.44 M/UL (ref 3.8–5.2)
SODIUM SERPL-SCNC: 146 MMOL/L (ref 136–145)
SODIUM SERPL-SCNC: 146 MMOL/L (ref 136–145)
WBC # BLD AUTO: 12.1 K/UL (ref 3.6–11)

## 2024-04-23 PROCEDURE — 83880 ASSAY OF NATRIURETIC PEPTIDE: CPT

## 2024-04-23 PROCEDURE — 1100000000 HC RM PRIVATE

## 2024-04-23 PROCEDURE — 80053 COMPREHEN METABOLIC PANEL: CPT

## 2024-04-23 PROCEDURE — 2700000000 HC OXYGEN THERAPY PER DAY

## 2024-04-23 PROCEDURE — 6370000000 HC RX 637 (ALT 250 FOR IP): Performed by: FAMILY MEDICINE

## 2024-04-23 PROCEDURE — 83605 ASSAY OF LACTIC ACID: CPT

## 2024-04-23 PROCEDURE — 71045 X-RAY EXAM CHEST 1 VIEW: CPT

## 2024-04-23 PROCEDURE — 6360000002 HC RX W HCPCS: Performed by: SURGERY

## 2024-04-23 PROCEDURE — 6360000002 HC RX W HCPCS: Performed by: INTERNAL MEDICINE

## 2024-04-23 PROCEDURE — 87040 BLOOD CULTURE FOR BACTERIA: CPT

## 2024-04-23 PROCEDURE — 94761 N-INVAS EAR/PLS OXIMETRY MLT: CPT

## 2024-04-23 PROCEDURE — 2500000003 HC RX 250 WO HCPCS: Performed by: INTERNAL MEDICINE

## 2024-04-23 PROCEDURE — 85025 COMPLETE CBC W/AUTO DIFF WBC: CPT

## 2024-04-23 PROCEDURE — 80069 RENAL FUNCTION PANEL: CPT

## 2024-04-23 PROCEDURE — 36415 COLL VENOUS BLD VENIPUNCTURE: CPT

## 2024-04-23 PROCEDURE — 2500000003 HC RX 250 WO HCPCS: Performed by: SURGERY

## 2024-04-23 PROCEDURE — 2580000003 HC RX 258: Performed by: INTERNAL MEDICINE

## 2024-04-23 PROCEDURE — 2580000003 HC RX 258: Performed by: SURGERY

## 2024-04-23 PROCEDURE — 2580000003 HC RX 258: Performed by: FAMILY MEDICINE

## 2024-04-23 RX ORDER — POTASSIUM CHLORIDE 7.45 MG/ML
10 INJECTION INTRAVENOUS
Status: COMPLETED | OUTPATIENT
Start: 2024-04-23 | End: 2024-04-23

## 2024-04-23 RX ORDER — SODIUM CHLORIDE AND POTASSIUM CHLORIDE 150; 450 MG/100ML; MG/100ML
INJECTION, SOLUTION INTRAVENOUS CONTINUOUS
Status: DISCONTINUED | OUTPATIENT
Start: 2024-04-23 | End: 2024-05-01

## 2024-04-23 RX ORDER — POTASSIUM CHLORIDE 7.45 MG/ML
10 INJECTION INTRAVENOUS ONCE
Status: DISCONTINUED | OUTPATIENT
Start: 2024-04-23 | End: 2024-05-01 | Stop reason: HOSPADM

## 2024-04-23 RX ADMIN — SODIUM CHLORIDE, PRESERVATIVE FREE 10 ML: 5 INJECTION INTRAVENOUS at 21:51

## 2024-04-23 RX ADMIN — CIPROFLOXACIN 400 MG: 2 INJECTION, SOLUTION INTRAVENOUS at 08:45

## 2024-04-23 RX ADMIN — HYDROMORPHONE HYDROCHLORIDE 1 MG: 1 INJECTION, SOLUTION INTRAMUSCULAR; INTRAVENOUS; SUBCUTANEOUS at 21:49

## 2024-04-23 RX ADMIN — HYDROMORPHONE HYDROCHLORIDE 0.5 MG: 1 INJECTION, SOLUTION INTRAMUSCULAR; INTRAVENOUS; SUBCUTANEOUS at 16:51

## 2024-04-23 RX ADMIN — SODIUM CHLORIDE 250 ML: 9 INJECTION, SOLUTION INTRAVENOUS at 22:15

## 2024-04-23 RX ADMIN — POTASSIUM CHLORIDE 10 MEQ: 7.46 INJECTION, SOLUTION INTRAVENOUS at 15:30

## 2024-04-23 RX ADMIN — HYDROMORPHONE HYDROCHLORIDE 1 MG: 1 INJECTION, SOLUTION INTRAMUSCULAR; INTRAVENOUS; SUBCUTANEOUS at 08:27

## 2024-04-23 RX ADMIN — METRONIDAZOLE 500 MG: 500 INJECTION, SOLUTION INTRAVENOUS at 11:43

## 2024-04-23 RX ADMIN — SODIUM CHLORIDE, PRESERVATIVE FREE 10 ML: 5 INJECTION INTRAVENOUS at 08:52

## 2024-04-23 RX ADMIN — CIPROFLOXACIN 400 MG: 2 INJECTION, SOLUTION INTRAVENOUS at 23:22

## 2024-04-23 RX ADMIN — SODIUM CHLORIDE: 9 INJECTION, SOLUTION INTRAVENOUS at 03:15

## 2024-04-23 RX ADMIN — ESCITALOPRAM OXALATE 20 MG: 10 TABLET ORAL at 08:49

## 2024-04-23 RX ADMIN — POTASSIUM CHLORIDE 10 MEQ: 7.46 INJECTION, SOLUTION INTRAVENOUS at 16:30

## 2024-04-23 RX ADMIN — METRONIDAZOLE 500 MG: 500 INJECTION, SOLUTION INTRAVENOUS at 22:15

## 2024-04-23 RX ADMIN — POTASSIUM PHOSPHATE, MONOBASIC POTASSIUM PHOSPHATE, DIBASIC 15 MMOL: 224; 236 INJECTION, SOLUTION, CONCENTRATE INTRAVENOUS at 16:44

## 2024-04-23 RX ADMIN — METRONIDAZOLE 500 MG: 500 INJECTION, SOLUTION INTRAVENOUS at 03:19

## 2024-04-23 ASSESSMENT — PAIN SCALES - GENERAL
PAINLEVEL_OUTOF10: 10
PAINLEVEL_OUTOF10: 10
PAINLEVEL_OUTOF10: 7
PAINLEVEL_OUTOF10: 10

## 2024-04-23 ASSESSMENT — PAIN DESCRIPTION - DESCRIPTORS: DESCRIPTORS: ACHING

## 2024-04-23 ASSESSMENT — PAIN SCALES - WONG BAKER: WONGBAKER_NUMERICALRESPONSE: NO HURT

## 2024-04-23 ASSESSMENT — PAIN DESCRIPTION - ORIENTATION: ORIENTATION: LOWER

## 2024-04-23 ASSESSMENT — PAIN DESCRIPTION - LOCATION: LOCATION: ABDOMEN

## 2024-04-23 NOTE — H&P
sodium chloride flush 0.9 % injection 5-40 mL, 5-40 mL, IntraVENous, PRN, Tonia Chinchilla MD    0.9 % sodium chloride infusion, , IntraVENous, PRN, Tonia Chinchilla MD    lidocaine 4 % external patch 1 patch, 1 patch, Topical, PRN, Tonia Chinchilla MD    fentaNYL (SUBLIMAZE) injection 50 mcg, 50 mcg, IntraVENous, Q5 Min PRNRenée Rajwinder S, MD    HYDROmorphone HCl PF (DILAUDID) injection 0.5 mg, 0.5 mg, IntraVENous, Q5 Min PRNRenée Rajwinder S, MD    oxyCODONE (ROXICODONE) immediate release tablet 5 mg, 5 mg, Oral, PRN **OR** oxyCODONE (ROXICODONE) immediate release tablet 10 mg, 10 mg, Oral, PRN, Tonia Chinchilla MD    ondansetron (ZOFRAN) injection 4 mg, 4 mg, IntraVENous, Once PRN, Tonia Chinchilla MD    metoclopramide (REGLAN) injection 10 mg, 10 mg, IntraVENous, Once PRN, Tonia Chinchilla MD    LORazepam (ATIVAN) injection 0.5 mg, 0.5 mg, IntraVENous, Once PRNRenée Rajwinder S, MD    diphenhydrAMINE (BENADRYL) injection 12.5 mg, 12.5 mg, IntraVENous, Once PRNRenée Rajwinder S, MD    labetalol (NORMODYNE;TRANDATE) injection 10 mg, 10 mg, IntraVENous, Q15 Min PRN **OR** hydrALAZINE (APRESOLINE) injection 10 mg, 10 mg, IntraVENous, Q15 Min PRNRenée Rajwinder S, MD    ipratropium 0.5 mg-albuterol 2.5 mg (DUONEB) nebulizer solution 1 Dose, 1 Dose, Inhalation, Once PRNRenée Rajwinder S, MD    glucose chewable tablet 16 g, 4 tablet, Oral, PRN, Tonia Chinchilla MD    dextrose bolus 10% 125 mL, 125 mL, IntraVENous, PRN **OR** dextrose bolus 10% 250 mL, 250 mL, IntraVENous, PRNRenée Rajwinder S, MD    glucagon injection 1 mg, 1 mg, SubCUTAneous, PRN, Tonia Chinchilla MD    dextrose 10 % infusion, , IntraVENous, Continuous PRN, Tonia Chinchilla MD    sodium chloride flush 0.9 % injection 5-40 mL, 5-40 mL, IntraVENous, 2 times per day, Berto Yen MD    sodium chloride flush 0.9 % injection 5-40 mL, 5-40 mL, IntraVENous, PRN, Berto Yen MD    0.9 % sodium chloride infusion, ,

## 2024-04-23 NOTE — CARE COORDINATION
04/23/24 1200   Service Assessment   Patient Orientation Unable to Assess   Cognition Alert   History Provided By Child/Family   Primary Caregiver Self   Support Systems Children   Patient's Healthcare Decision Maker is: Legal Next of Kin   PCP Verified by CM Yes   Last Visit to PCP Within last 6 months   Prior Functional Level Independent in ADLs/IADLs   Current Functional Level Assistance with the following:;Bathing;Dressing;Toileting;Feeding;Mobility   Can patient return to prior living arrangement Yes   Ability to make needs known: Good   Family able to assist with home care needs: Yes   Would you like for me to discuss the discharge plan with any other family members/significant others, and if so, who? Yes  (Daughter- Salena)   Financial Resources Medicare   Social/Functional History   Lives With Daughter   Home Equipment Rollator   Discharge Planning   Patient expects to be discharged to: House   Services At/After Discharge   Mode of Transport at Discharge Other (see comment)  (Daughter)     Patient lives at home with her daughter, Salena.  Patient uses a rollator at home and is typically independent in care.  Patient has been to a SNF in the past ([facility]).  Daughter would not like for patient to return to a SNF if not needed.  Patient uses [facility], but if discharging on the weekend patient would possibly be interested in  Meds to Beds.  Patient's daughter will be her ride at discharge.  Current Dispo: Home with daughter pending recs.    Advance Care Planning     General Advance Care Planning (ACP) Conversation    Date of Conversation: 4/23/2024  Conducted with: Patient with Decision Making Capacity    Healthcare Decision Maker:  No healthcare decision makers have been documented.  Click here to complete HealthCare Decision Makers including selection of the Healthcare Decision Maker Relationship (ie \"Primary\")   Today we documented Decision Maker(s) consistent with Legal Next of Kin

## 2024-04-23 NOTE — BRIEF OP NOTE
Color Yellow 04/23/24 0615   Urine Appearance Clear 04/23/24 0615   Collection Container Standard 04/22/24 2207   Securement Method Securing device (Describe) 04/22/24 2207   Catheter Care  Perineal wipes 04/22/24 2207   Catheter Best Practices  Drainage tube clipped to bed;Catheter secured to thigh;Tamper seal intact;Bag below bladder;Lack of dependent loop in tubing;Bag not on floor;Drainage bag less than half full 04/22/24 2207   Status Draining 04/23/24 0615   Output (mL) 350 mL 04/23/24 0615       Findings:  Infection Present At Time Of Surgery (PATOS) (choose all levels that have infection present):  No infection present  Other Findings: As above    Electronically signed by Farshad Thomson MD on 4/23/2024 at 7:51 AM

## 2024-04-23 NOTE — CONSULTS
Nephrology Consultation          Patient: Britta Quevedo MRN: [id number]  SSN: [id number]    YOB: 1950  Age: 74 y.o.  Sex: female      Subjective:   Reason for the consultation.  Chronic kidney disease and hypokalemia  History of present illness.  The patient is 74-year-old woman with underlying history of hypertension, chronic kidney disease stage III, hyperlipidemia developed abdominal pain post colonoscopy and turnout to have colonic perforation status post laparoscopic partial sigmoid colectomy and colostomy placement.  She was noted to have elevated serum creatinine of 1.2 which prompted nephrology consultation.  She has underlying history of chronic kidney disease.  No noticed lower extremity swelling.  She is on room air.  Hemodynamically stable and good blood pressures.  No voiding issues.    Past Medical History:   Diagnosis Date    Arthritis     Chronic kidney disease     Depression     Endometriosis     Hyperlipemia      Past Surgical History:   Procedure Laterality Date    BACK SURGERY      CHOLECYSTECTOMY      COLONOSCOPY      COLONOSCOPY N/A 4/22/2024    COLONOSCOPY performed by Julian Pratt MD at [facility] ENDOSCOPY    HYSTERECTOMY (CERVIX STATUS UNKNOWN)      TONSILLECTOMY        Family History   Problem Relation Age of Onset    Dementia Mother     Heart Disease Father     Heart Attack Brother     Brain Cancer Daughter      Social History     Tobacco Use    Smoking status: Never    Smokeless tobacco: Never   Substance Use Topics    Alcohol use: Never      Current Facility-Administered Medications   Medication Dose Route Frequency Provider Last Rate Last Admin    potassium chloride 10 mEq/100 mL IVPB (Peripheral Line)  10 mEq IntraVENous Once Tiffany Vasquez MD        sodium chloride flush 0.9 % injection 5-40 mL  5-40 mL IntraVENous 2 times per day Berto Yen MD        sodium chloride flush 0.9 % injection 5-40 mL  5-40 mL IntraVENous PRN Berto Yen MD        0.9 %

## 2024-04-23 NOTE — PERIOP NOTE
Pt remains extremely drowsy, opens eyes briefly, oral airway remians intact,  VSS, End tidal CO2 WNL. Anesthsia asked to come assess , Chu CISNEROS at [facility] to assess pt. No new orders.

## 2024-04-23 NOTE — WOUND CARE
WCN met with patient and family to give ostomy education folder. Discussed emptying and changing pouch. Discussed possible discharge plans. Advised family will follow up daily while inpatient to continue education. Answered any questions family had. WCN to continue to follow for education and pouching needs.

## 2024-04-23 NOTE — PERIOP NOTE
TRANSFER - OUT REPORT:    Verbal report given to Bryant RN(name) on Britta Quevedo  being transferred to (unit) for routine post-op       Report consisted of patient’s Situation, Background, Assessment and   Recommendations(SBAR).     Information from the following report(s) Nurse Handoff Report, Adult Overview, Surgery Report, Intake/Output, MAR, Recent Results, Cardiac Rhythm SR, Procedure Verification, Quality Measures, and Neuro Assessment was reviewed with the receiving nurse.    Opportunity for questions and clarification was provided.      Patient transported with:   O2 @ 4 liters  Registered Nurse Elias and Danya RNs    Lines: IVF Help Text LR infusing     This SmartLink retrieves the last documented value for LDA assessment data, retrieved by either LDA type or by LDA group ID.     This SmartLink should be used in a SmartText or SmartPhrase. If one is not available, please contact your .

## 2024-04-24 LAB
ALBUMIN SERPL-MCNC: 3 G/DL (ref 3.5–5)
ALBUMIN SERPL-MCNC: 3.1 G/DL (ref 3.5–5)
ALBUMIN/GLOB SERPL: 1 (ref 1.1–2.2)
ALP SERPL-CCNC: 36 U/L (ref 45–117)
ALT SERPL-CCNC: 32 U/L (ref 12–78)
ANION GAP SERPL CALC-SCNC: 5 MMOL/L (ref 5–15)
ANION GAP SERPL CALC-SCNC: 6 MMOL/L (ref 5–15)
AST SERPL W P-5'-P-CCNC: 35 U/L (ref 15–37)
BACTERIA SPEC CULT: NORMAL
BILIRUB SERPL-MCNC: 0.6 MG/DL (ref 0.2–1)
BUN SERPL-MCNC: 21 MG/DL (ref 6–20)
BUN SERPL-MCNC: 22 MG/DL (ref 6–20)
BUN/CREAT SERPL: 17 (ref 12–20)
BUN/CREAT SERPL: 19 (ref 12–20)
CA-I BLD-MCNC: 8.7 MG/DL (ref 8.5–10.1)
CA-I BLD-MCNC: 8.9 MG/DL (ref 8.5–10.1)
CHLORIDE SERPL-SCNC: 116 MMOL/L (ref 97–108)
CHLORIDE SERPL-SCNC: 116 MMOL/L (ref 97–108)
CO2 SERPL-SCNC: 23 MMOL/L (ref 21–32)
CO2 SERPL-SCNC: 25 MMOL/L (ref 21–32)
CREAT SERPL-MCNC: 1.17 MG/DL (ref 0.55–1.02)
CREAT SERPL-MCNC: 1.24 MG/DL (ref 0.55–1.02)
GLOBULIN SER CALC-MCNC: 3.1 G/DL (ref 2–4)
GLUCOSE SERPL-MCNC: 130 MG/DL (ref 65–100)
GLUCOSE SERPL-MCNC: 130 MG/DL (ref 65–100)
Lab: NORMAL
PHOSPHATE SERPL-MCNC: 1.7 MG/DL (ref 2.6–4.7)
POTASSIUM SERPL-SCNC: 3.5 MMOL/L (ref 3.5–5.1)
POTASSIUM SERPL-SCNC: 3.5 MMOL/L (ref 3.5–5.1)
PROT SERPL-MCNC: 6.1 G/DL (ref 6.4–8.2)
SODIUM SERPL-SCNC: 145 MMOL/L (ref 136–145)
SODIUM SERPL-SCNC: 146 MMOL/L (ref 136–145)

## 2024-04-24 PROCEDURE — 36415 COLL VENOUS BLD VENIPUNCTURE: CPT

## 2024-04-24 PROCEDURE — 2500000003 HC RX 250 WO HCPCS: Performed by: INTERNAL MEDICINE

## 2024-04-24 PROCEDURE — 80053 COMPREHEN METABOLIC PANEL: CPT

## 2024-04-24 PROCEDURE — 1100000000 HC RM PRIVATE

## 2024-04-24 PROCEDURE — 2580000003 HC RX 258: Performed by: INTERNAL MEDICINE

## 2024-04-24 PROCEDURE — 80069 RENAL FUNCTION PANEL: CPT

## 2024-04-24 PROCEDURE — 6360000002 HC RX W HCPCS: Performed by: SURGERY

## 2024-04-24 PROCEDURE — 2500000003 HC RX 250 WO HCPCS: Performed by: FAMILY MEDICINE

## 2024-04-24 PROCEDURE — 6360000002 HC RX W HCPCS: Performed by: INTERNAL MEDICINE

## 2024-04-24 PROCEDURE — 99024 POSTOP FOLLOW-UP VISIT: CPT | Performed by: SURGERY

## 2024-04-24 RX ORDER — HYDROMORPHONE HYDROCHLORIDE 1 MG/ML
1 INJECTION, SOLUTION INTRAMUSCULAR; INTRAVENOUS; SUBCUTANEOUS EVERY 4 HOURS PRN
Status: DISPENSED | OUTPATIENT
Start: 2024-04-24 | End: 2024-04-26

## 2024-04-24 RX ADMIN — POTASSIUM CHLORIDE AND SODIUM CHLORIDE: 450; 150 INJECTION, SOLUTION INTRAVENOUS at 00:28

## 2024-04-24 RX ADMIN — METRONIDAZOLE 500 MG: 500 INJECTION, SOLUTION INTRAVENOUS at 12:47

## 2024-04-24 RX ADMIN — HYDROMORPHONE HYDROCHLORIDE 1 MG: 1 INJECTION, SOLUTION INTRAMUSCULAR; INTRAVENOUS; SUBCUTANEOUS at 14:00

## 2024-04-24 RX ADMIN — HYDROMORPHONE HYDROCHLORIDE 1 MG: 1 INJECTION, SOLUTION INTRAMUSCULAR; INTRAVENOUS; SUBCUTANEOUS at 21:29

## 2024-04-24 RX ADMIN — POTASSIUM CHLORIDE AND SODIUM CHLORIDE: 450; 150 INJECTION, SOLUTION INTRAVENOUS at 09:50

## 2024-04-24 RX ADMIN — CIPROFLOXACIN 400 MG: 2 INJECTION, SOLUTION INTRAVENOUS at 21:20

## 2024-04-24 RX ADMIN — POTASSIUM PHOSPHATE, MONOBASIC POTASSIUM PHOSPHATE, DIBASIC 20 MMOL: 224; 236 INJECTION, SOLUTION, CONCENTRATE INTRAVENOUS at 22:28

## 2024-04-24 RX ADMIN — METRONIDAZOLE 500 MG: 500 INJECTION, SOLUTION INTRAVENOUS at 21:27

## 2024-04-24 RX ADMIN — METRONIDAZOLE 500 MG: 500 INJECTION, SOLUTION INTRAVENOUS at 03:48

## 2024-04-24 RX ADMIN — CIPROFLOXACIN 400 MG: 2 INJECTION, SOLUTION INTRAVENOUS at 09:50

## 2024-04-24 ASSESSMENT — PAIN SCALES - GENERAL
PAINLEVEL_OUTOF10: 0
PAINLEVEL_OUTOF10: 10
PAINLEVEL_OUTOF10: 10
PAINLEVEL_OUTOF10: 6

## 2024-04-24 ASSESSMENT — PAIN - FUNCTIONAL ASSESSMENT: PAIN_FUNCTIONAL_ASSESSMENT: PREVENTS OR INTERFERES WITH MANY ACTIVE NOT PASSIVE ACTIVITIES

## 2024-04-24 ASSESSMENT — PAIN SCALES - WONG BAKER: WONGBAKER_NUMERICALRESPONSE: NO HURT

## 2024-04-24 ASSESSMENT — PAIN DESCRIPTION - LOCATION
LOCATION: ABDOMEN
LOCATION: ABDOMEN

## 2024-04-24 ASSESSMENT — PAIN DESCRIPTION - DESCRIPTORS: DESCRIPTORS: ACHING;DISCOMFORT

## 2024-04-24 NOTE — CARE COORDINATION
CM reviewed chart. Per physician patient has greater than 72 hours before they may be medically stable for dc.    Current DCP is home with daughter.    CM will continue to follow.

## 2024-04-24 NOTE — OP NOTE
Operative Note      Patient: Britta Quevedo  YOB: 1950  MRN: [id number]    Date of Procedure: 4/22/2024    Pre-Op Diagnosis Codes:     * Free intraperitoneal air [K66.8]    Post-Op Diagnosis:   Diverticulitis.  Significant amount of intra-abdominal adhesion.         Procedure:  1.  Diagnostic laparoscopy.  2.  Exploratory laparotomy.  3.  Lysis of adhesion of the left tubo-ovarian complex to the sigmoid colon.  4.  Partial sigmoid colon resection.  5.  End colostomy placement.    Surgeon(s):  Berto Yen MD    Assistant:   * No surgical staff found *    Anesthesia: General    Estimated Blood Loss (mL): 200     Complications: None    Specimens:   ID Type Source Tests Collected by Time Destination   1 : Partial Sigmoid Colon Tissue Sigmoid Colon SURGICAL PATHOLOGY Berto Yen MD 4/22/2024 1854    A : Urine Culture Urine Urine, indwelling catheter CULTURE, URINE Berto Yen MD 4/22/2024 1745        Implants:  * No implants in log *      Drains:   Closed/Suction Drain Lateral RLQ Bulb (Active)   Site Description Clean, dry & intact 04/23/24 2000   Dressing Status Clean, dry & intact 04/23/24 2000   Drainage Appearance Bloody 04/23/24 2000   Drain Status Compressed 04/23/24 2000   Output (ml) 30 ml 04/23/24 1916       NG/OG/NJ/NE Tube Nasogastric Right nostril (Active)   Surrounding Skin Clean, dry & intact 04/23/24 2000   Securement device Adhesive based cohen 04/23/24 2000   Status Suction-low intermittent 04/23/24 2000   Placement Verified Gastric Contents 04/23/24 2000   NG/OG/NJ/NE External Measurement (cm) 55 cm 04/23/24 2000   Drainage Appearance Bile;Brown 04/24/24 0034   Output (mL) 300 ml 04/24/24 0034       Colostomy LLQ (Active)   Stomal Appliance Clean, dry & intact 04/23/24 2000   Stoma  Assessment Pink;Moist 04/23/24 2000   Peristomal Assessment Clean, dry & intact 04/22/24 2207   Mucocutaneous Junction Intact 04/22/24 1940   Stool Appearance Bloody 04/23/24 2000   Output (mL) 10

## 2024-04-25 LAB
ALBUMIN SERPL-MCNC: 2.8 G/DL (ref 3.5–5)
ALBUMIN/GLOB SERPL: 0.9 (ref 1.1–2.2)
ALP SERPL-CCNC: 36 U/L (ref 45–117)
ALT SERPL-CCNC: 23 U/L (ref 12–78)
ANION GAP SERPL CALC-SCNC: 5 MMOL/L (ref 5–15)
AST SERPL W P-5'-P-CCNC: 27 U/L (ref 15–37)
BILIRUB SERPL-MCNC: 0.6 MG/DL (ref 0.2–1)
BUN SERPL-MCNC: 19 MG/DL (ref 6–20)
BUN/CREAT SERPL: 17 (ref 12–20)
CA-I BLD-MCNC: 8.5 MG/DL (ref 8.5–10.1)
CHLORIDE SERPL-SCNC: 113 MMOL/L (ref 97–108)
CO2 SERPL-SCNC: 27 MMOL/L (ref 21–32)
CREAT SERPL-MCNC: 1.09 MG/DL (ref 0.55–1.02)
ERYTHROCYTE [DISTWIDTH] IN BLOOD BY AUTOMATED COUNT: 13.4 % (ref 11.5–14.5)
GLOBULIN SER CALC-MCNC: 3.2 G/DL (ref 2–4)
GLUCOSE SERPL-MCNC: 116 MG/DL (ref 65–100)
HCT VFR BLD AUTO: 28.3 % (ref 35–47)
HGB BLD-MCNC: 8.9 G/DL (ref 11.5–16)
MCH RBC QN AUTO: 29.7 PG (ref 26–34)
MCHC RBC AUTO-ENTMCNC: 31.4 G/DL (ref 30–36.5)
MCV RBC AUTO: 94.3 FL (ref 80–99)
NRBC # BLD: 0 K/UL (ref 0–0.01)
NRBC BLD-RTO: 0 PER 100 WBC
PHOSPHATE SERPL-MCNC: 2.5 MG/DL (ref 2.6–4.7)
PLATELET # BLD AUTO: 175 K/UL (ref 150–400)
PMV BLD AUTO: 10.4 FL (ref 8.9–12.9)
POTASSIUM SERPL-SCNC: 3.8 MMOL/L (ref 3.5–5.1)
PROT SERPL-MCNC: 6 G/DL (ref 6.4–8.2)
RBC # BLD AUTO: 3 M/UL (ref 3.8–5.2)
SODIUM SERPL-SCNC: 145 MMOL/L (ref 136–145)
WBC # BLD AUTO: 7.7 K/UL (ref 3.6–11)

## 2024-04-25 PROCEDURE — 2700000000 HC OXYGEN THERAPY PER DAY

## 2024-04-25 PROCEDURE — 99024 POSTOP FOLLOW-UP VISIT: CPT | Performed by: SURGERY

## 2024-04-25 PROCEDURE — 94761 N-INVAS EAR/PLS OXIMETRY MLT: CPT

## 2024-04-25 PROCEDURE — 6360000002 HC RX W HCPCS: Performed by: INTERNAL MEDICINE

## 2024-04-25 PROCEDURE — 80053 COMPREHEN METABOLIC PANEL: CPT

## 2024-04-25 PROCEDURE — 6360000002 HC RX W HCPCS: Performed by: SURGERY

## 2024-04-25 PROCEDURE — 1100000000 HC RM PRIVATE

## 2024-04-25 PROCEDURE — 36415 COLL VENOUS BLD VENIPUNCTURE: CPT

## 2024-04-25 PROCEDURE — 2580000003 HC RX 258: Performed by: FAMILY MEDICINE

## 2024-04-25 PROCEDURE — 2500000003 HC RX 250 WO HCPCS: Performed by: FAMILY MEDICINE

## 2024-04-25 PROCEDURE — 85027 COMPLETE CBC AUTOMATED: CPT

## 2024-04-25 PROCEDURE — 84100 ASSAY OF PHOSPHORUS: CPT

## 2024-04-25 RX ADMIN — METRONIDAZOLE 500 MG: 500 INJECTION, SOLUTION INTRAVENOUS at 20:42

## 2024-04-25 RX ADMIN — METRONIDAZOLE 500 MG: 500 INJECTION, SOLUTION INTRAVENOUS at 12:17

## 2024-04-25 RX ADMIN — POTASSIUM CHLORIDE AND SODIUM CHLORIDE: 450; 150 INJECTION, SOLUTION INTRAVENOUS at 12:17

## 2024-04-25 RX ADMIN — CIPROFLOXACIN 400 MG: 2 INJECTION, SOLUTION INTRAVENOUS at 20:42

## 2024-04-25 RX ADMIN — HYDROMORPHONE HYDROCHLORIDE 1 MG: 1 INJECTION, SOLUTION INTRAMUSCULAR; INTRAVENOUS; SUBCUTANEOUS at 12:50

## 2024-04-25 RX ADMIN — METRONIDAZOLE 500 MG: 500 INJECTION, SOLUTION INTRAVENOUS at 03:39

## 2024-04-25 RX ADMIN — SODIUM CHLORIDE, PRESERVATIVE FREE 10 ML: 5 INJECTION INTRAVENOUS at 12:21

## 2024-04-25 RX ADMIN — HYDROMORPHONE HYDROCHLORIDE 1 MG: 1 INJECTION, SOLUTION INTRAMUSCULAR; INTRAVENOUS; SUBCUTANEOUS at 20:42

## 2024-04-25 RX ADMIN — CIPROFLOXACIN 400 MG: 2 INJECTION, SOLUTION INTRAVENOUS at 12:17

## 2024-04-25 ASSESSMENT — PAIN SCALES - GENERAL
PAINLEVEL_OUTOF10: 7
PAINLEVEL_OUTOF10: 10

## 2024-04-25 ASSESSMENT — PAIN DESCRIPTION - LOCATION
LOCATION: ABDOMEN
LOCATION: ABDOMEN

## 2024-04-25 NOTE — WOUND CARE
1113: WCN stopped by patient room for education; no family present at this time. Pouch intact with no stool in pouch. Patient complaining of some abdominal pain. WCN to follow up again later this afternoon.     1400: WCN checked back in with patient, she is resting in bed. No family present at this time. WCN to continue to follow.

## 2024-04-26 LAB
ALBUMIN SERPL-MCNC: 2.8 G/DL (ref 3.5–5)
ALBUMIN/GLOB SERPL: 0.9 (ref 1.1–2.2)
ALP SERPL-CCNC: 39 U/L (ref 45–117)
ALT SERPL-CCNC: 22 U/L (ref 12–78)
ANION GAP SERPL CALC-SCNC: 6 MMOL/L (ref 5–15)
AST SERPL W P-5'-P-CCNC: 32 U/L (ref 15–37)
BILIRUB SERPL-MCNC: 0.6 MG/DL (ref 0.2–1)
BUN SERPL-MCNC: 20 MG/DL (ref 6–20)
BUN/CREAT SERPL: 23 (ref 12–20)
CA-I BLD-MCNC: 8.8 MG/DL (ref 8.5–10.1)
CHLORIDE SERPL-SCNC: 110 MMOL/L (ref 97–108)
CO2 SERPL-SCNC: 28 MMOL/L (ref 21–32)
CREAT SERPL-MCNC: 0.88 MG/DL (ref 0.55–1.02)
ERYTHROCYTE [DISTWIDTH] IN BLOOD BY AUTOMATED COUNT: 13.2 % (ref 11.5–14.5)
GLOBULIN SER CALC-MCNC: 3.2 G/DL (ref 2–4)
GLUCOSE SERPL-MCNC: 120 MG/DL (ref 65–100)
HCT VFR BLD AUTO: 28.1 % (ref 35–47)
HGB BLD-MCNC: 9.1 G/DL (ref 11.5–16)
MAGNESIUM SERPL-MCNC: 1.6 MG/DL (ref 1.6–2.4)
MCH RBC QN AUTO: 30.3 PG (ref 26–34)
MCHC RBC AUTO-ENTMCNC: 32.4 G/DL (ref 30–36.5)
MCV RBC AUTO: 93.7 FL (ref 80–99)
NRBC # BLD: 0 K/UL (ref 0–0.01)
NRBC BLD-RTO: 0 PER 100 WBC
PHOSPHATE SERPL-MCNC: 1.7 MG/DL (ref 2.6–4.7)
PLATELET # BLD AUTO: 141 K/UL (ref 150–400)
PMV BLD AUTO: 10.9 FL (ref 8.9–12.9)
POTASSIUM SERPL-SCNC: 3.8 MMOL/L (ref 3.5–5.1)
PROT SERPL-MCNC: 6 G/DL (ref 6.4–8.2)
RBC # BLD AUTO: 3 M/UL (ref 3.8–5.2)
SODIUM SERPL-SCNC: 144 MMOL/L (ref 136–145)
WBC # BLD AUTO: 6.6 K/UL (ref 3.6–11)

## 2024-04-26 PROCEDURE — 80053 COMPREHEN METABOLIC PANEL: CPT

## 2024-04-26 PROCEDURE — 6360000002 HC RX W HCPCS: Performed by: SURGERY

## 2024-04-26 PROCEDURE — 6360000002 HC RX W HCPCS: Performed by: INTERNAL MEDICINE

## 2024-04-26 PROCEDURE — 36415 COLL VENOUS BLD VENIPUNCTURE: CPT

## 2024-04-26 PROCEDURE — C9113 INJ PANTOPRAZOLE SODIUM, VIA: HCPCS | Performed by: SURGERY

## 2024-04-26 PROCEDURE — 2580000003 HC RX 258: Performed by: SURGERY

## 2024-04-26 PROCEDURE — 2500000003 HC RX 250 WO HCPCS: Performed by: FAMILY MEDICINE

## 2024-04-26 PROCEDURE — 2500000003 HC RX 250 WO HCPCS: Performed by: INTERNAL MEDICINE

## 2024-04-26 PROCEDURE — 6370000000 HC RX 637 (ALT 250 FOR IP): Performed by: FAMILY MEDICINE

## 2024-04-26 PROCEDURE — 85027 COMPLETE CBC AUTOMATED: CPT

## 2024-04-26 PROCEDURE — 2580000003 HC RX 258: Performed by: INTERNAL MEDICINE

## 2024-04-26 PROCEDURE — 83735 ASSAY OF MAGNESIUM: CPT

## 2024-04-26 PROCEDURE — 99024 POSTOP FOLLOW-UP VISIT: CPT | Performed by: SURGERY

## 2024-04-26 PROCEDURE — 84100 ASSAY OF PHOSPHORUS: CPT

## 2024-04-26 PROCEDURE — 1100000000 HC RM PRIVATE

## 2024-04-26 PROCEDURE — 2580000003 HC RX 258: Performed by: FAMILY MEDICINE

## 2024-04-26 RX ORDER — HYDROMORPHONE HYDROCHLORIDE 1 MG/ML
1 INJECTION, SOLUTION INTRAMUSCULAR; INTRAVENOUS; SUBCUTANEOUS EVERY 4 HOURS PRN
Status: DISPENSED | OUTPATIENT
Start: 2024-04-26 | End: 2024-04-28

## 2024-04-26 RX ORDER — PANTOPRAZOLE SODIUM 40 MG/10ML
40 INJECTION, POWDER, LYOPHILIZED, FOR SOLUTION INTRAVENOUS DAILY
Status: DISCONTINUED | OUTPATIENT
Start: 2024-04-26 | End: 2024-05-01 | Stop reason: HOSPADM

## 2024-04-26 RX ADMIN — POTASSIUM PHOSPHATE, MONOBASIC POTASSIUM PHOSPHATE, DIBASIC 20 MMOL: 224; 236 INJECTION, SOLUTION, CONCENTRATE INTRAVENOUS at 09:44

## 2024-04-26 RX ADMIN — SODIUM CHLORIDE, PRESERVATIVE FREE 10 ML: 5 INJECTION INTRAVENOUS at 20:37

## 2024-04-26 RX ADMIN — DIBASIC SODIUM PHOSPHATE, MONOBASIC POTASSIUM PHOSPHATE AND MONOBASIC SODIUM PHOSPHATE 1 TABLET: 852; 155; 130 TABLET ORAL at 20:36

## 2024-04-26 RX ADMIN — SODIUM CHLORIDE, PRESERVATIVE FREE 10 ML: 5 INJECTION INTRAVENOUS at 09:48

## 2024-04-26 RX ADMIN — SODIUM CHLORIDE, PRESERVATIVE FREE 10 ML: 5 INJECTION INTRAVENOUS at 20:38

## 2024-04-26 RX ADMIN — METRONIDAZOLE 500 MG: 500 INJECTION, SOLUTION INTRAVENOUS at 04:02

## 2024-04-26 RX ADMIN — CIPROFLOXACIN 400 MG: 2 INJECTION, SOLUTION INTRAVENOUS at 20:36

## 2024-04-26 RX ADMIN — HYDROMORPHONE HYDROCHLORIDE 1 MG: 1 INJECTION, SOLUTION INTRAMUSCULAR; INTRAVENOUS; SUBCUTANEOUS at 18:52

## 2024-04-26 RX ADMIN — DIBASIC SODIUM PHOSPHATE, MONOBASIC POTASSIUM PHOSPHATE AND MONOBASIC SODIUM PHOSPHATE 1 TABLET: 852; 155; 130 TABLET ORAL at 13:04

## 2024-04-26 RX ADMIN — POTASSIUM CHLORIDE AND SODIUM CHLORIDE: 450; 150 INJECTION, SOLUTION INTRAVENOUS at 22:21

## 2024-04-26 RX ADMIN — METRONIDAZOLE 500 MG: 500 INJECTION, SOLUTION INTRAVENOUS at 13:04

## 2024-04-26 RX ADMIN — SODIUM CHLORIDE, PRESERVATIVE FREE 10 ML: 5 INJECTION INTRAVENOUS at 09:46

## 2024-04-26 RX ADMIN — POTASSIUM CHLORIDE AND SODIUM CHLORIDE: 450; 150 INJECTION, SOLUTION INTRAVENOUS at 02:45

## 2024-04-26 RX ADMIN — METRONIDAZOLE 500 MG: 500 INJECTION, SOLUTION INTRAVENOUS at 20:36

## 2024-04-26 RX ADMIN — HYDROMORPHONE HYDROCHLORIDE 1 MG: 1 INJECTION, SOLUTION INTRAMUSCULAR; INTRAVENOUS; SUBCUTANEOUS at 10:06

## 2024-04-26 RX ADMIN — PANTOPRAZOLE SODIUM 40 MG: 40 INJECTION, POWDER, FOR SOLUTION INTRAVENOUS at 13:04

## 2024-04-26 RX ADMIN — HYDROMORPHONE HYDROCHLORIDE 1 MG: 1 INJECTION, SOLUTION INTRAMUSCULAR; INTRAVENOUS; SUBCUTANEOUS at 01:51

## 2024-04-26 RX ADMIN — CIPROFLOXACIN 400 MG: 2 INJECTION, SOLUTION INTRAVENOUS at 09:47

## 2024-04-26 ASSESSMENT — PAIN SCALES - GENERAL
PAINLEVEL_OUTOF10: 0
PAINLEVEL_OUTOF10: 8

## 2024-04-26 ASSESSMENT — PAIN - FUNCTIONAL ASSESSMENT
PAIN_FUNCTIONAL_ASSESSMENT: ACTIVITIES ARE NOT PREVENTED
PAIN_FUNCTIONAL_ASSESSMENT: PREVENTS OR INTERFERES SOME ACTIVE ACTIVITIES AND ADLS
PAIN_FUNCTIONAL_ASSESSMENT: ACTIVITIES ARE NOT PREVENTED

## 2024-04-26 ASSESSMENT — PAIN DESCRIPTION - DESCRIPTORS
DESCRIPTORS: ACHING

## 2024-04-26 ASSESSMENT — PAIN DESCRIPTION - LOCATION
LOCATION: ABDOMEN

## 2024-04-26 ASSESSMENT — PAIN DESCRIPTION - ORIENTATION
ORIENTATION: ANTERIOR
ORIENTATION: RIGHT;ANTERIOR
ORIENTATION: ANTERIOR

## 2024-04-26 ASSESSMENT — PAIN SCALES - WONG BAKER
WONGBAKER_NUMERICALRESPONSE: HURTS WHOLE LOT
WONGBAKER_NUMERICALRESPONSE: NO HURT

## 2024-04-26 ASSESSMENT — PAIN DESCRIPTION - PAIN TYPE: TYPE: SURGICAL PAIN

## 2024-04-26 ASSESSMENT — PAIN DESCRIPTION - FREQUENCY: FREQUENCY: CONTINUOUS

## 2024-04-26 NOTE — CARE COORDINATION
CM reviewed chart. Waiting for return of bowel function, currently NPO.    Current discharge plans are home with family once medically stable.

## 2024-04-26 NOTE — WOUND CARE
WCN met with patient for ostomy education. Patient resting in bed sleeping, no family present at this time. Ostomy pouch intact, no stool or gas present. Patient not appropriate for education, WCN to continue to follow for ostomy education with family and pouching needs.

## 2024-04-27 LAB
ALBUMIN SERPL-MCNC: 2.6 G/DL (ref 3.5–5)
ALBUMIN/GLOB SERPL: 0.9 (ref 1.1–2.2)
ALP SERPL-CCNC: 34 U/L (ref 45–117)
ALT SERPL-CCNC: 17 U/L (ref 12–78)
ANION GAP SERPL CALC-SCNC: 10 MMOL/L (ref 5–15)
AST SERPL W P-5'-P-CCNC: 23 U/L (ref 15–37)
BILIRUB SERPL-MCNC: 0.5 MG/DL (ref 0.2–1)
BUN SERPL-MCNC: 16 MG/DL (ref 6–20)
BUN/CREAT SERPL: 22 (ref 12–20)
CA-I BLD-MCNC: 8.5 MG/DL (ref 8.5–10.1)
CHLORIDE SERPL-SCNC: 109 MMOL/L (ref 97–108)
CO2 SERPL-SCNC: 23 MMOL/L (ref 21–32)
CREAT SERPL-MCNC: 0.72 MG/DL (ref 0.55–1.02)
ERYTHROCYTE [DISTWIDTH] IN BLOOD BY AUTOMATED COUNT: 13.2 % (ref 11.5–14.5)
GLOBULIN SER CALC-MCNC: 2.8 G/DL (ref 2–4)
GLUCOSE SERPL-MCNC: 102 MG/DL (ref 65–100)
HCT VFR BLD AUTO: 27 % (ref 35–47)
HGB BLD-MCNC: 8.5 G/DL (ref 11.5–16)
MCH RBC QN AUTO: 30.2 PG (ref 26–34)
MCHC RBC AUTO-ENTMCNC: 31.5 G/DL (ref 30–36.5)
MCV RBC AUTO: 96.1 FL (ref 80–99)
NRBC # BLD: 0 K/UL (ref 0–0.01)
NRBC BLD-RTO: 0 PER 100 WBC
PHOSPHATE SERPL-MCNC: 2.3 MG/DL (ref 2.6–4.7)
PLATELET # BLD AUTO: 161 K/UL (ref 150–400)
PMV BLD AUTO: 10.3 FL (ref 8.9–12.9)
POTASSIUM SERPL-SCNC: 4.1 MMOL/L (ref 3.5–5.1)
PROT SERPL-MCNC: 5.4 G/DL (ref 6.4–8.2)
RBC # BLD AUTO: 2.81 M/UL (ref 3.8–5.2)
SODIUM SERPL-SCNC: 142 MMOL/L (ref 136–145)
WBC # BLD AUTO: 4.8 K/UL (ref 3.6–11)

## 2024-04-27 PROCEDURE — 6360000002 HC RX W HCPCS: Performed by: INTERNAL MEDICINE

## 2024-04-27 PROCEDURE — 2500000003 HC RX 250 WO HCPCS: Performed by: FAMILY MEDICINE

## 2024-04-27 PROCEDURE — 84100 ASSAY OF PHOSPHORUS: CPT

## 2024-04-27 PROCEDURE — 94761 N-INVAS EAR/PLS OXIMETRY MLT: CPT

## 2024-04-27 PROCEDURE — 85027 COMPLETE CBC AUTOMATED: CPT

## 2024-04-27 PROCEDURE — 97530 THERAPEUTIC ACTIVITIES: CPT

## 2024-04-27 PROCEDURE — 97535 SELF CARE MNGMENT TRAINING: CPT

## 2024-04-27 PROCEDURE — 2580000003 HC RX 258: Performed by: SURGERY

## 2024-04-27 PROCEDURE — 36415 COLL VENOUS BLD VENIPUNCTURE: CPT

## 2024-04-27 PROCEDURE — C9113 INJ PANTOPRAZOLE SODIUM, VIA: HCPCS | Performed by: SURGERY

## 2024-04-27 PROCEDURE — 97165 OT EVAL LOW COMPLEX 30 MIN: CPT

## 2024-04-27 PROCEDURE — 6370000000 HC RX 637 (ALT 250 FOR IP): Performed by: FAMILY MEDICINE

## 2024-04-27 PROCEDURE — 97161 PT EVAL LOW COMPLEX 20 MIN: CPT

## 2024-04-27 PROCEDURE — 6360000002 HC RX W HCPCS: Performed by: SURGERY

## 2024-04-27 PROCEDURE — 2580000003 HC RX 258: Performed by: FAMILY MEDICINE

## 2024-04-27 PROCEDURE — 80053 COMPREHEN METABOLIC PANEL: CPT

## 2024-04-27 PROCEDURE — 1100000000 HC RM PRIVATE

## 2024-04-27 RX ORDER — HEPARIN SODIUM 5000 [USP'U]/ML
5000 INJECTION, SOLUTION INTRAVENOUS; SUBCUTANEOUS EVERY 8 HOURS SCHEDULED
Status: DISCONTINUED | OUTPATIENT
Start: 2024-04-27 | End: 2024-05-01 | Stop reason: HOSPADM

## 2024-04-27 RX ADMIN — PANTOPRAZOLE SODIUM 40 MG: 40 INJECTION, POWDER, FOR SOLUTION INTRAVENOUS at 11:07

## 2024-04-27 RX ADMIN — DIBASIC SODIUM PHOSPHATE, MONOBASIC POTASSIUM PHOSPHATE AND MONOBASIC SODIUM PHOSPHATE 1 TABLET: 852; 155; 130 TABLET ORAL at 13:10

## 2024-04-27 RX ADMIN — METRONIDAZOLE 500 MG: 500 INJECTION, SOLUTION INTRAVENOUS at 11:07

## 2024-04-27 RX ADMIN — SODIUM CHLORIDE, PRESERVATIVE FREE 10 ML: 5 INJECTION INTRAVENOUS at 21:29

## 2024-04-27 RX ADMIN — HYDROMORPHONE HYDROCHLORIDE 1 MG: 1 INJECTION, SOLUTION INTRAMUSCULAR; INTRAVENOUS; SUBCUTANEOUS at 01:34

## 2024-04-27 RX ADMIN — HYDROMORPHONE HYDROCHLORIDE 1 MG: 1 INJECTION, SOLUTION INTRAMUSCULAR; INTRAVENOUS; SUBCUTANEOUS at 06:29

## 2024-04-27 RX ADMIN — METRONIDAZOLE 500 MG: 500 INJECTION, SOLUTION INTRAVENOUS at 21:00

## 2024-04-27 RX ADMIN — POTASSIUM CHLORIDE AND SODIUM CHLORIDE: 450; 150 INJECTION, SOLUTION INTRAVENOUS at 11:16

## 2024-04-27 RX ADMIN — CIPROFLOXACIN 400 MG: 2 INJECTION, SOLUTION INTRAVENOUS at 21:00

## 2024-04-27 RX ADMIN — CIPROFLOXACIN 400 MG: 2 INJECTION, SOLUTION INTRAVENOUS at 11:07

## 2024-04-27 RX ADMIN — ESCITALOPRAM OXALATE 20 MG: 10 TABLET ORAL at 13:10

## 2024-04-27 RX ADMIN — HYDROMORPHONE HYDROCHLORIDE 1 MG: 1 INJECTION, SOLUTION INTRAMUSCULAR; INTRAVENOUS; SUBCUTANEOUS at 11:09

## 2024-04-27 RX ADMIN — SODIUM CHLORIDE, PRESERVATIVE FREE 10 ML: 5 INJECTION INTRAVENOUS at 23:20

## 2024-04-27 RX ADMIN — METRONIDAZOLE 500 MG: 500 INJECTION, SOLUTION INTRAVENOUS at 03:56

## 2024-04-27 RX ADMIN — HYDROMORPHONE HYDROCHLORIDE 1 MG: 1 INJECTION, SOLUTION INTRAMUSCULAR; INTRAVENOUS; SUBCUTANEOUS at 18:29

## 2024-04-27 RX ADMIN — DIBASIC SODIUM PHOSPHATE, MONOBASIC POTASSIUM PHOSPHATE AND MONOBASIC SODIUM PHOSPHATE 1 TABLET: 852; 155; 130 TABLET ORAL at 22:00

## 2024-04-27 RX ADMIN — HYDROMORPHONE HYDROCHLORIDE 1 MG: 1 INJECTION, SOLUTION INTRAMUSCULAR; INTRAVENOUS; SUBCUTANEOUS at 23:20

## 2024-04-27 RX ADMIN — POTASSIUM CHLORIDE AND SODIUM CHLORIDE: 450; 150 INJECTION, SOLUTION INTRAVENOUS at 21:32

## 2024-04-27 ASSESSMENT — PAIN - FUNCTIONAL ASSESSMENT
PAIN_FUNCTIONAL_ASSESSMENT: PREVENTS OR INTERFERES SOME ACTIVE ACTIVITIES AND ADLS
PAIN_FUNCTIONAL_ASSESSMENT: ACTIVITIES ARE NOT PREVENTED

## 2024-04-27 ASSESSMENT — PAIN SCALES - GENERAL
PAINLEVEL_OUTOF10: 8
PAINLEVEL_OUTOF10: 8
PAINLEVEL_OUTOF10: 7
PAINLEVEL_OUTOF10: 9
PAINLEVEL_OUTOF10: 8
PAINLEVEL_OUTOF10: 0

## 2024-04-27 ASSESSMENT — PAIN DESCRIPTION - ORIENTATION
ORIENTATION: RIGHT;LEFT
ORIENTATION: RIGHT;LEFT;MID
ORIENTATION: ANTERIOR

## 2024-04-27 ASSESSMENT — PAIN DESCRIPTION - LOCATION
LOCATION: ABDOMEN

## 2024-04-27 ASSESSMENT — PAIN SCALES - WONG BAKER
WONGBAKER_NUMERICALRESPONSE: NO HURT
WONGBAKER_NUMERICALRESPONSE: HURTS WHOLE LOT
WONGBAKER_NUMERICALRESPONSE: NO HURT
WONGBAKER_NUMERICALRESPONSE: NO HURT

## 2024-04-27 ASSESSMENT — PAIN DESCRIPTION - DESCRIPTORS
DESCRIPTORS: ACHING
DESCRIPTORS: PATIENT UNABLE TO DESCRIBE

## 2024-04-28 LAB
ALBUMIN SERPL-MCNC: 2.6 G/DL (ref 3.5–5)
ALBUMIN/GLOB SERPL: 0.9 (ref 1.1–2.2)
ALP SERPL-CCNC: 34 U/L (ref 45–117)
ALT SERPL-CCNC: 16 U/L (ref 12–78)
ANION GAP SERPL CALC-SCNC: 8 MMOL/L (ref 5–15)
AST SERPL W P-5'-P-CCNC: 21 U/L (ref 15–37)
BILIRUB SERPL-MCNC: 0.5 MG/DL (ref 0.2–1)
BUN SERPL-MCNC: 12 MG/DL (ref 6–20)
BUN/CREAT SERPL: 16 (ref 12–20)
CA-I BLD-MCNC: 8.9 MG/DL (ref 8.5–10.1)
CHLORIDE SERPL-SCNC: 108 MMOL/L (ref 97–108)
CO2 SERPL-SCNC: 24 MMOL/L (ref 21–32)
CREAT SERPL-MCNC: 0.74 MG/DL (ref 0.55–1.02)
ERYTHROCYTE [DISTWIDTH] IN BLOOD BY AUTOMATED COUNT: 13.1 % (ref 11.5–14.5)
FERRITIN SERPL-MCNC: 326 NG/ML (ref 26–388)
GLOBULIN SER CALC-MCNC: 2.8 G/DL (ref 2–4)
GLUCOSE SERPL-MCNC: 92 MG/DL (ref 65–100)
HCT VFR BLD AUTO: 25.7 % (ref 35–47)
HGB BLD-MCNC: 8.3 G/DL (ref 11.5–16)
IRON SATN MFR SERPL: 25 % (ref 20–50)
IRON SERPL-MCNC: 41 UG/DL (ref 35–150)
MAGNESIUM SERPL-MCNC: 1.5 MG/DL (ref 1.6–2.4)
MCH RBC QN AUTO: 29.9 PG (ref 26–34)
MCHC RBC AUTO-ENTMCNC: 32.3 G/DL (ref 30–36.5)
MCV RBC AUTO: 92.4 FL (ref 80–99)
NRBC # BLD: 0 K/UL (ref 0–0.01)
NRBC BLD-RTO: 0 PER 100 WBC
PHOSPHATE SERPL-MCNC: 2 MG/DL (ref 2.6–4.7)
PLATELET # BLD AUTO: 189 K/UL (ref 150–400)
PMV BLD AUTO: 10 FL (ref 8.9–12.9)
POTASSIUM SERPL-SCNC: 3.9 MMOL/L (ref 3.5–5.1)
PROT SERPL-MCNC: 5.4 G/DL (ref 6.4–8.2)
RBC # BLD AUTO: 2.78 M/UL (ref 3.8–5.2)
SODIUM SERPL-SCNC: 140 MMOL/L (ref 136–145)
TIBC SERPL-MCNC: 165 UG/DL (ref 250–450)
WBC # BLD AUTO: 5.6 K/UL (ref 3.6–11)

## 2024-04-28 PROCEDURE — 85027 COMPLETE CBC AUTOMATED: CPT

## 2024-04-28 PROCEDURE — 6370000000 HC RX 637 (ALT 250 FOR IP): Performed by: FAMILY MEDICINE

## 2024-04-28 PROCEDURE — 6360000002 HC RX W HCPCS: Performed by: SURGERY

## 2024-04-28 PROCEDURE — 82728 ASSAY OF FERRITIN: CPT

## 2024-04-28 PROCEDURE — 84100 ASSAY OF PHOSPHORUS: CPT

## 2024-04-28 PROCEDURE — 6370000000 HC RX 637 (ALT 250 FOR IP): Performed by: SURGERY

## 2024-04-28 PROCEDURE — 97530 THERAPEUTIC ACTIVITIES: CPT

## 2024-04-28 PROCEDURE — 2500000003 HC RX 250 WO HCPCS: Performed by: FAMILY MEDICINE

## 2024-04-28 PROCEDURE — 36415 COLL VENOUS BLD VENIPUNCTURE: CPT

## 2024-04-28 PROCEDURE — 80053 COMPREHEN METABOLIC PANEL: CPT

## 2024-04-28 PROCEDURE — 1100000000 HC RM PRIVATE

## 2024-04-28 PROCEDURE — 2500000003 HC RX 250 WO HCPCS: Performed by: INTERNAL MEDICINE

## 2024-04-28 PROCEDURE — 2580000003 HC RX 258: Performed by: SURGERY

## 2024-04-28 PROCEDURE — C9113 INJ PANTOPRAZOLE SODIUM, VIA: HCPCS | Performed by: SURGERY

## 2024-04-28 PROCEDURE — 83540 ASSAY OF IRON: CPT

## 2024-04-28 PROCEDURE — 94761 N-INVAS EAR/PLS OXIMETRY MLT: CPT

## 2024-04-28 PROCEDURE — 2580000003 HC RX 258: Performed by: FAMILY MEDICINE

## 2024-04-28 PROCEDURE — 83735 ASSAY OF MAGNESIUM: CPT

## 2024-04-28 PROCEDURE — 6360000002 HC RX W HCPCS: Performed by: INTERNAL MEDICINE

## 2024-04-28 RX ORDER — MAGNESIUM SULFATE HEPTAHYDRATE 40 MG/ML
2000 INJECTION, SOLUTION INTRAVENOUS ONCE
Status: COMPLETED | OUTPATIENT
Start: 2024-04-28 | End: 2024-04-28

## 2024-04-28 RX ORDER — OXYCODONE HYDROCHLORIDE 5 MG/1
5 TABLET ORAL EVERY 4 HOURS PRN
Status: COMPLETED | OUTPATIENT
Start: 2024-04-28 | End: 2024-04-29

## 2024-04-28 RX ADMIN — HYDROMORPHONE HYDROCHLORIDE 1 MG: 1 INJECTION, SOLUTION INTRAMUSCULAR; INTRAVENOUS; SUBCUTANEOUS at 05:20

## 2024-04-28 RX ADMIN — PANTOPRAZOLE SODIUM 40 MG: 40 INJECTION, POWDER, FOR SOLUTION INTRAVENOUS at 09:32

## 2024-04-28 RX ADMIN — CIPROFLOXACIN 400 MG: 2 INJECTION, SOLUTION INTRAVENOUS at 09:35

## 2024-04-28 RX ADMIN — DIBASIC SODIUM PHOSPHATE, MONOBASIC POTASSIUM PHOSPHATE AND MONOBASIC SODIUM PHOSPHATE 1 TABLET: 852; 155; 130 TABLET ORAL at 09:32

## 2024-04-28 RX ADMIN — POTASSIUM CHLORIDE AND SODIUM CHLORIDE: 450; 150 INJECTION, SOLUTION INTRAVENOUS at 21:21

## 2024-04-28 RX ADMIN — METRONIDAZOLE 500 MG: 500 INJECTION, SOLUTION INTRAVENOUS at 21:17

## 2024-04-28 RX ADMIN — POTASSIUM CHLORIDE AND SODIUM CHLORIDE: 450; 150 INJECTION, SOLUTION INTRAVENOUS at 09:32

## 2024-04-28 RX ADMIN — METRONIDAZOLE 500 MG: 500 INJECTION, SOLUTION INTRAVENOUS at 13:50

## 2024-04-28 RX ADMIN — HYDROMORPHONE HYDROCHLORIDE 1 MG: 1 INJECTION, SOLUTION INTRAMUSCULAR; INTRAVENOUS; SUBCUTANEOUS at 09:32

## 2024-04-28 RX ADMIN — SODIUM CHLORIDE, PRESERVATIVE FREE 10 ML: 5 INJECTION INTRAVENOUS at 21:17

## 2024-04-28 RX ADMIN — DIBASIC SODIUM PHOSPHATE, MONOBASIC POTASSIUM PHOSPHATE AND MONOBASIC SODIUM PHOSPHATE 1 TABLET: 852; 155; 130 TABLET ORAL at 21:01

## 2024-04-28 RX ADMIN — CIPROFLOXACIN 400 MG: 2 INJECTION, SOLUTION INTRAVENOUS at 21:00

## 2024-04-28 RX ADMIN — MAGNESIUM SULFATE HEPTAHYDRATE 2000 MG: 40 INJECTION, SOLUTION INTRAVENOUS at 13:48

## 2024-04-28 RX ADMIN — ESCITALOPRAM OXALATE 20 MG: 10 TABLET ORAL at 09:32

## 2024-04-28 RX ADMIN — SODIUM CHLORIDE, PRESERVATIVE FREE 10 ML: 5 INJECTION INTRAVENOUS at 09:35

## 2024-04-28 RX ADMIN — OXYCODONE HYDROCHLORIDE 5 MG: 5 TABLET ORAL at 13:46

## 2024-04-28 RX ADMIN — OXYCODONE HYDROCHLORIDE 5 MG: 5 TABLET ORAL at 23:38

## 2024-04-28 RX ADMIN — METRONIDAZOLE 500 MG: 500 INJECTION, SOLUTION INTRAVENOUS at 04:30

## 2024-04-28 RX ADMIN — SODIUM CHLORIDE, PRESERVATIVE FREE 10 ML: 5 INJECTION INTRAVENOUS at 21:01

## 2024-04-28 ASSESSMENT — PAIN DESCRIPTION - DESCRIPTORS
DESCRIPTORS: THROBBING
DESCRIPTORS: ACHING
DESCRIPTORS: THROBBING
DESCRIPTORS: ACHING

## 2024-04-28 ASSESSMENT — PAIN SCALES - GENERAL
PAINLEVEL_OUTOF10: 1
PAINLEVEL_OUTOF10: 8
PAINLEVEL_OUTOF10: 5
PAINLEVEL_OUTOF10: 8
PAINLEVEL_OUTOF10: 8
PAINLEVEL_OUTOF10: 9

## 2024-04-28 ASSESSMENT — PAIN DESCRIPTION - LOCATION
LOCATION: ABDOMEN

## 2024-04-28 ASSESSMENT — PAIN SCALES - WONG BAKER
WONGBAKER_NUMERICALRESPONSE: NO HURT
WONGBAKER_NUMERICALRESPONSE: HURTS A LITTLE BIT

## 2024-04-28 ASSESSMENT — PAIN DESCRIPTION - ORIENTATION
ORIENTATION: RIGHT;LEFT
ORIENTATION: RIGHT;LEFT
ORIENTATION: ANTERIOR
ORIENTATION: ANTERIOR

## 2024-04-28 ASSESSMENT — PAIN - FUNCTIONAL ASSESSMENT
PAIN_FUNCTIONAL_ASSESSMENT: ACTIVITIES ARE NOT PREVENTED

## 2024-04-29 ENCOUNTER — APPOINTMENT (OUTPATIENT)
Facility: HOSPITAL | Age: 74
DRG: 330 | End: 2024-04-29
Attending: INTERNAL MEDICINE
Payer: MEDICARE

## 2024-04-29 LAB
ALBUMIN SERPL-MCNC: 2.4 G/DL (ref 3.5–5)
ALBUMIN/GLOB SERPL: 0.9 (ref 1.1–2.2)
ALP SERPL-CCNC: 32 U/L (ref 45–117)
ALT SERPL-CCNC: 15 U/L (ref 12–78)
ANION GAP SERPL CALC-SCNC: 10 MMOL/L (ref 5–15)
AST SERPL W P-5'-P-CCNC: 15 U/L (ref 15–37)
BACTERIA SPEC CULT: NORMAL
BACTERIA SPEC CULT: NORMAL
BILIRUB SERPL-MCNC: 0.5 MG/DL (ref 0.2–1)
BUN SERPL-MCNC: 9 MG/DL (ref 6–20)
BUN/CREAT SERPL: 14 (ref 12–20)
CA-I BLD-MCNC: 8.8 MG/DL (ref 8.5–10.1)
CHLORIDE SERPL-SCNC: 108 MMOL/L (ref 97–108)
CO2 SERPL-SCNC: 21 MMOL/L (ref 21–32)
CREAT SERPL-MCNC: 0.66 MG/DL (ref 0.55–1.02)
ERYTHROCYTE [DISTWIDTH] IN BLOOD BY AUTOMATED COUNT: 13.2 % (ref 11.5–14.5)
GLOBULIN SER CALC-MCNC: 2.7 G/DL (ref 2–4)
GLUCOSE SERPL-MCNC: 86 MG/DL (ref 65–100)
HCT VFR BLD AUTO: 25 % (ref 35–47)
HGB BLD-MCNC: 8.1 G/DL (ref 11.5–16)
Lab: NORMAL
Lab: NORMAL
MAGNESIUM SERPL-MCNC: 1.9 MG/DL (ref 1.6–2.4)
MCH RBC QN AUTO: 29.8 PG (ref 26–34)
MCHC RBC AUTO-ENTMCNC: 32.4 G/DL (ref 30–36.5)
MCV RBC AUTO: 91.9 FL (ref 80–99)
NRBC # BLD: 0 K/UL (ref 0–0.01)
NRBC BLD-RTO: 0 PER 100 WBC
PHOSPHATE SERPL-MCNC: 2.2 MG/DL (ref 2.6–4.7)
PLATELET # BLD AUTO: 204 K/UL (ref 150–400)
PMV BLD AUTO: 10 FL (ref 8.9–12.9)
POTASSIUM SERPL-SCNC: 3.6 MMOL/L (ref 3.5–5.1)
PROT SERPL-MCNC: 5.1 G/DL (ref 6.4–8.2)
RBC # BLD AUTO: 2.72 M/UL (ref 3.8–5.2)
SODIUM SERPL-SCNC: 139 MMOL/L (ref 136–145)
WBC # BLD AUTO: 5.2 K/UL (ref 3.6–11)

## 2024-04-29 PROCEDURE — 80053 COMPREHEN METABOLIC PANEL: CPT

## 2024-04-29 PROCEDURE — 36415 COLL VENOUS BLD VENIPUNCTURE: CPT

## 2024-04-29 PROCEDURE — 99024 POSTOP FOLLOW-UP VISIT: CPT | Performed by: SURGERY

## 2024-04-29 PROCEDURE — 83735 ASSAY OF MAGNESIUM: CPT

## 2024-04-29 PROCEDURE — 6360000002 HC RX W HCPCS: Performed by: SURGERY

## 2024-04-29 PROCEDURE — 6360000002 HC RX W HCPCS: Performed by: INTERNAL MEDICINE

## 2024-04-29 PROCEDURE — 84100 ASSAY OF PHOSPHORUS: CPT

## 2024-04-29 PROCEDURE — 6370000000 HC RX 637 (ALT 250 FOR IP): Performed by: SURGERY

## 2024-04-29 PROCEDURE — 85027 COMPLETE CBC AUTOMATED: CPT

## 2024-04-29 PROCEDURE — 2500000003 HC RX 250 WO HCPCS: Performed by: FAMILY MEDICINE

## 2024-04-29 PROCEDURE — C9113 INJ PANTOPRAZOLE SODIUM, VIA: HCPCS | Performed by: SURGERY

## 2024-04-29 PROCEDURE — 6370000000 HC RX 637 (ALT 250 FOR IP): Performed by: FAMILY MEDICINE

## 2024-04-29 PROCEDURE — 2580000003 HC RX 258: Performed by: FAMILY MEDICINE

## 2024-04-29 PROCEDURE — 6360000004 HC RX CONTRAST MEDICATION: Performed by: SURGERY

## 2024-04-29 PROCEDURE — 1100000000 HC RM PRIVATE

## 2024-04-29 PROCEDURE — 74176 CT ABD & PELVIS W/O CONTRAST: CPT

## 2024-04-29 RX ADMIN — POTASSIUM CHLORIDE AND SODIUM CHLORIDE: 450; 150 INJECTION, SOLUTION INTRAVENOUS at 09:16

## 2024-04-29 RX ADMIN — CIPROFLOXACIN 400 MG: 2 INJECTION, SOLUTION INTRAVENOUS at 08:21

## 2024-04-29 RX ADMIN — ESCITALOPRAM OXALATE 20 MG: 10 TABLET ORAL at 08:20

## 2024-04-29 RX ADMIN — DIBASIC SODIUM PHOSPHATE, MONOBASIC POTASSIUM PHOSPHATE AND MONOBASIC SODIUM PHOSPHATE 1 TABLET: 852; 155; 130 TABLET ORAL at 21:10

## 2024-04-29 RX ADMIN — DIBASIC SODIUM PHOSPHATE, MONOBASIC POTASSIUM PHOSPHATE AND MONOBASIC SODIUM PHOSPHATE 1 TABLET: 852; 155; 130 TABLET ORAL at 08:20

## 2024-04-29 RX ADMIN — POTASSIUM PHOSPHATE, MONOBASIC POTASSIUM PHOSPHATE, DIBASIC 20 MMOL: 224; 236 INJECTION, SOLUTION, CONCENTRATE INTRAVENOUS at 14:06

## 2024-04-29 RX ADMIN — METRONIDAZOLE 500 MG: 500 INJECTION, SOLUTION INTRAVENOUS at 21:05

## 2024-04-29 RX ADMIN — DIATRIZOATE MEGLUMINE AND DIATRIZOATE SODIUM 30 ML: 660; 100 LIQUID ORAL; RECTAL at 08:20

## 2024-04-29 RX ADMIN — METRONIDAZOLE 500 MG: 500 INJECTION, SOLUTION INTRAVENOUS at 12:05

## 2024-04-29 RX ADMIN — POTASSIUM CHLORIDE AND SODIUM CHLORIDE: 450; 150 INJECTION, SOLUTION INTRAVENOUS at 21:11

## 2024-04-29 RX ADMIN — CIPROFLOXACIN 400 MG: 2 INJECTION, SOLUTION INTRAVENOUS at 20:54

## 2024-04-29 RX ADMIN — OXYCODONE HYDROCHLORIDE 5 MG: 5 TABLET ORAL at 06:37

## 2024-04-29 RX ADMIN — METRONIDAZOLE 500 MG: 500 INJECTION, SOLUTION INTRAVENOUS at 05:06

## 2024-04-29 RX ADMIN — OXYCODONE HYDROCHLORIDE 5 MG: 5 TABLET ORAL at 14:29

## 2024-04-29 RX ADMIN — PANTOPRAZOLE SODIUM 40 MG: 40 INJECTION, POWDER, FOR SOLUTION INTRAVENOUS at 08:20

## 2024-04-29 RX ADMIN — SODIUM CHLORIDE, PRESERVATIVE FREE 10 ML: 5 INJECTION INTRAVENOUS at 21:07

## 2024-04-29 ASSESSMENT — PAIN DESCRIPTION - ORIENTATION
ORIENTATION: LEFT
ORIENTATION: ANTERIOR;POSTERIOR

## 2024-04-29 ASSESSMENT — PAIN SCALES - GENERAL
PAINLEVEL_OUTOF10: 6
PAINLEVEL_OUTOF10: 6
PAINLEVEL_OUTOF10: 2
PAINLEVEL_OUTOF10: 0
PAINLEVEL_OUTOF10: 9

## 2024-04-29 ASSESSMENT — PAIN SCALES - WONG BAKER: WONGBAKER_NUMERICALRESPONSE: NO HURT

## 2024-04-29 ASSESSMENT — PAIN DESCRIPTION - DESCRIPTORS
DESCRIPTORS: ACHING
DESCRIPTORS: ACHING;CRUSHING

## 2024-04-29 ASSESSMENT — PAIN DESCRIPTION - LOCATION
LOCATION: ABDOMEN
LOCATION: ABDOMEN;BACK

## 2024-04-29 NOTE — CARE COORDINATION
Call made to daughter to discuss Home health and DME. Daughter confirmed patient has rolling walker already at home and they are open to idea of home health. Gave ok to send referrals for home health, no preference. [facility]     Daughter also wants wound care nurse to call her when they come so she can be taught. Wound care made aware.    1410: informed daughter patient has been accepted by [facility] health once medically stable for discharge.     1510: keturah elam form filled out and faxed to [employer]. Form needs signature from Dr. Yen when available.

## 2024-04-29 NOTE — WOUND CARE
1150: Called patient daughter to set up time to meet to go over ostomy education, plan to meet around 3 today to change pouch and go over ostomy education.     1420: WCN notified that patient had large bowel movement and original pouch had busted and nurse changed pouch. Nurse stated that now pouch was leaking again. Dark green watery stool noted to be leaking down patients side from pouch. Old pouch removed and patient cleaned up. Peristomal skin intact. Stoma is red and moist and is relatively flush with the skin. Convex wafer cut to fit over stoma and then applied over stoma and barrier extender strips applied around edges. No leaks noted at this time. Underpad and patient gown changed.     [employer] ostomy supply form completed and given to . Patient enrolled in [facility].    1500: Met with patient and her daughter and niece to discuss ostomy education. Showed them how to cut out wafer and and apply wafer/pouch. Showed how to empty pouch and when to empty pouch. Plan to work with patient on feeling when pouch needs to be emptied and how to empty pouch. Plan to meet with daughter at 0830 on Thursday to change pouch again. WCN to continue to follow for education and pouching needs.     Discussed the following education:    -Measuring the stoma  -Cutting wafer to fit stoma and wafer and pouch application.  -Empty / burp pouch when 1/3-1/2 full of stool or gas.  -Change appliance (wafer and pouch) 2-3 per week.  If leaking, change as soon as possible to prevent skin irritation.  -Best time for routine change of appliance is first thing in the morning before consuming food or liquids (depending on which type of ostomy).  -Clean stoma and peristomal skin with plain water or NS, may use a mild soap.  No soaps with lotions as they may prevent adhesive from adhering to skin.  May bathe with appliance on or off.  -Purposes and how to use barrier rings, stoma paste, and stoma powder.   -Stoma should always

## 2024-04-30 LAB
ALBUMIN SERPL-MCNC: 2.5 G/DL (ref 3.5–5)
ALBUMIN/GLOB SERPL: 0.9 (ref 1.1–2.2)
ALP SERPL-CCNC: 34 U/L (ref 45–117)
ALT SERPL-CCNC: 15 U/L (ref 12–78)
ANION GAP SERPL CALC-SCNC: 11 MMOL/L (ref 5–15)
AST SERPL W P-5'-P-CCNC: 16 U/L (ref 15–37)
BILIRUB SERPL-MCNC: 0.5 MG/DL (ref 0.2–1)
BUN SERPL-MCNC: 6 MG/DL (ref 6–20)
BUN/CREAT SERPL: 9 (ref 12–20)
CA-I BLD-MCNC: 8.7 MG/DL (ref 8.5–10.1)
CHLORIDE SERPL-SCNC: 110 MMOL/L (ref 97–108)
CO2 SERPL-SCNC: 21 MMOL/L (ref 21–32)
CREAT SERPL-MCNC: 0.64 MG/DL (ref 0.55–1.02)
ERYTHROCYTE [DISTWIDTH] IN BLOOD BY AUTOMATED COUNT: 13.3 % (ref 11.5–14.5)
GLOBULIN SER CALC-MCNC: 2.7 G/DL (ref 2–4)
GLUCOSE SERPL-MCNC: 92 MG/DL (ref 65–100)
HCT VFR BLD AUTO: 26.6 % (ref 35–47)
HGB BLD-MCNC: 8.8 G/DL (ref 11.5–16)
MCH RBC QN AUTO: 30.1 PG (ref 26–34)
MCHC RBC AUTO-ENTMCNC: 33.1 G/DL (ref 30–36.5)
MCV RBC AUTO: 91.1 FL (ref 80–99)
NRBC # BLD: 0 K/UL (ref 0–0.01)
NRBC BLD-RTO: 0 PER 100 WBC
PHOSPHATE SERPL-MCNC: 2.8 MG/DL (ref 2.6–4.7)
PLATELET # BLD AUTO: 217 K/UL (ref 150–400)
PMV BLD AUTO: 10.1 FL (ref 8.9–12.9)
POTASSIUM SERPL-SCNC: 3.8 MMOL/L (ref 3.5–5.1)
PROT SERPL-MCNC: 5.2 G/DL (ref 6.4–8.2)
RBC # BLD AUTO: 2.92 M/UL (ref 3.8–5.2)
SODIUM SERPL-SCNC: 142 MMOL/L (ref 136–145)
WBC # BLD AUTO: 5.4 K/UL (ref 3.6–11)

## 2024-04-30 PROCEDURE — 80053 COMPREHEN METABOLIC PANEL: CPT

## 2024-04-30 PROCEDURE — 97116 GAIT TRAINING THERAPY: CPT

## 2024-04-30 PROCEDURE — 6360000002 HC RX W HCPCS: Performed by: SURGERY

## 2024-04-30 PROCEDURE — 1100000000 HC RM PRIVATE

## 2024-04-30 PROCEDURE — 2580000003 HC RX 258: Performed by: SURGERY

## 2024-04-30 PROCEDURE — 36415 COLL VENOUS BLD VENIPUNCTURE: CPT

## 2024-04-30 PROCEDURE — 84100 ASSAY OF PHOSPHORUS: CPT

## 2024-04-30 PROCEDURE — 2580000003 HC RX 258: Performed by: FAMILY MEDICINE

## 2024-04-30 PROCEDURE — 85027 COMPLETE CBC AUTOMATED: CPT

## 2024-04-30 PROCEDURE — 97110 THERAPEUTIC EXERCISES: CPT

## 2024-04-30 PROCEDURE — C9113 INJ PANTOPRAZOLE SODIUM, VIA: HCPCS | Performed by: SURGERY

## 2024-04-30 PROCEDURE — 6370000000 HC RX 637 (ALT 250 FOR IP): Performed by: FAMILY MEDICINE

## 2024-04-30 PROCEDURE — 6360000002 HC RX W HCPCS: Performed by: INTERNAL MEDICINE

## 2024-04-30 PROCEDURE — 99024 POSTOP FOLLOW-UP VISIT: CPT | Performed by: SURGERY

## 2024-04-30 RX ORDER — OXYCODONE HYDROCHLORIDE 5 MG/1
5 TABLET ORAL EVERY 4 HOURS PRN
Status: DISCONTINUED | OUTPATIENT
Start: 2024-04-30 | End: 2024-05-01 | Stop reason: HOSPADM

## 2024-04-30 RX ADMIN — DIBASIC SODIUM PHOSPHATE, MONOBASIC POTASSIUM PHOSPHATE AND MONOBASIC SODIUM PHOSPHATE 1 TABLET: 852; 155; 130 TABLET ORAL at 09:08

## 2024-04-30 RX ADMIN — CIPROFLOXACIN 400 MG: 2 INJECTION, SOLUTION INTRAVENOUS at 20:27

## 2024-04-30 RX ADMIN — OXYCODONE 5 MG: 5 TABLET ORAL at 20:35

## 2024-04-30 RX ADMIN — CIPROFLOXACIN 400 MG: 2 INJECTION, SOLUTION INTRAVENOUS at 09:08

## 2024-04-30 RX ADMIN — SODIUM CHLORIDE, PRESERVATIVE FREE 10 ML: 5 INJECTION INTRAVENOUS at 20:33

## 2024-04-30 RX ADMIN — SODIUM CHLORIDE, PRESERVATIVE FREE 10 ML: 5 INJECTION INTRAVENOUS at 09:09

## 2024-04-30 RX ADMIN — OXYCODONE 5 MG: 5 TABLET ORAL at 09:08

## 2024-04-30 RX ADMIN — METRONIDAZOLE 500 MG: 500 INJECTION, SOLUTION INTRAVENOUS at 12:39

## 2024-04-30 RX ADMIN — PANTOPRAZOLE SODIUM 40 MG: 40 INJECTION, POWDER, FOR SOLUTION INTRAVENOUS at 09:08

## 2024-04-30 RX ADMIN — METRONIDAZOLE 500 MG: 500 INJECTION, SOLUTION INTRAVENOUS at 20:30

## 2024-04-30 RX ADMIN — ESCITALOPRAM OXALATE 20 MG: 10 TABLET ORAL at 09:08

## 2024-04-30 RX ADMIN — POTASSIUM CHLORIDE AND SODIUM CHLORIDE: 450; 150 INJECTION, SOLUTION INTRAVENOUS at 18:54

## 2024-04-30 ASSESSMENT — PAIN SCALES - GENERAL
PAINLEVEL_OUTOF10: 7
PAINLEVEL_OUTOF10: 9
PAINLEVEL_OUTOF10: 3
PAINLEVEL_OUTOF10: 8

## 2024-04-30 ASSESSMENT — PAIN DESCRIPTION - DESCRIPTORS
DESCRIPTORS: ACHING
DESCRIPTORS: ACHING;DISCOMFORT

## 2024-04-30 ASSESSMENT — PAIN DESCRIPTION - ORIENTATION: ORIENTATION: ANTERIOR

## 2024-04-30 ASSESSMENT — PAIN DESCRIPTION - LOCATION
LOCATION: ABDOMEN
LOCATION: ABDOMEN

## 2024-04-30 ASSESSMENT — PAIN - FUNCTIONAL ASSESSMENT: PAIN_FUNCTIONAL_ASSESSMENT: ACTIVITIES ARE NOT PREVENTED

## 2024-04-30 NOTE — WOUND CARE
WCN met with patient for continued ostomy education. Patient ostomy is intact at this time with no leaks. Provided patient with ostomy pouch to practice opening and closing pouch so that she can get used to it doing it on her own. Also discussed with patient starting to feel pouch to see if it needs to be emptied throughout the day. Patient will need to start working with nursing to work on emptying her pouch with assistance of staff.     Patient was able to demonstrate back to WCN twice how to open and close pouch herself.     Plant to change pouch with daughter on Thursday morning. WCN to continue to follow for education.

## 2024-04-30 NOTE — WOUND CARE
WCN consulted for leaking ostomy pouch.  Removed leaking appliance and cleaned peristomal skin with saline moistened gauze.  Applied non-sting skin protectant to peristomal skin, stoma paste around peristomal skin adjacent to stoma, and then applied a 1-piece convex pouch.  Next applied elastic barrier strips.  Patient advised to lay still for approximately 10 minutes to allow adhesive to adhere well to the skin.  Nursing student to apply stoma belt after patient is cleaned and linen & gown changed.  WCN will continue to follow for pouching and education needs.

## 2024-05-01 VITALS
OXYGEN SATURATION: 98 % | WEIGHT: 176.37 LBS | HEIGHT: 59 IN | TEMPERATURE: 98.2 F | DIASTOLIC BLOOD PRESSURE: 65 MMHG | HEART RATE: 79 BPM | BODY MASS INDEX: 35.56 KG/M2 | SYSTOLIC BLOOD PRESSURE: 138 MMHG | RESPIRATION RATE: 20 BRPM

## 2024-05-01 LAB
ALBUMIN SERPL-MCNC: 2.7 G/DL (ref 3.5–5)
ALBUMIN/GLOB SERPL: 0.9 (ref 1.1–2.2)
ALP SERPL-CCNC: 39 U/L (ref 45–117)
ALT SERPL-CCNC: 14 U/L (ref 12–78)
ANION GAP SERPL CALC-SCNC: 7 MMOL/L (ref 5–15)
AST SERPL W P-5'-P-CCNC: 14 U/L (ref 15–37)
BILIRUB SERPL-MCNC: 0.3 MG/DL (ref 0.2–1)
BUN SERPL-MCNC: 5 MG/DL (ref 6–20)
BUN/CREAT SERPL: 5 (ref 12–20)
CA-I BLD-MCNC: 9 MG/DL (ref 8.5–10.1)
CHLORIDE SERPL-SCNC: 113 MMOL/L (ref 97–108)
CO2 SERPL-SCNC: 23 MMOL/L (ref 21–32)
CREAT SERPL-MCNC: 1.04 MG/DL (ref 0.55–1.02)
ERYTHROCYTE [DISTWIDTH] IN BLOOD BY AUTOMATED COUNT: 13.8 % (ref 11.5–14.5)
GLOBULIN SER CALC-MCNC: 2.9 G/DL (ref 2–4)
GLUCOSE SERPL-MCNC: 124 MG/DL (ref 65–100)
HCT VFR BLD AUTO: 28.7 % (ref 35–47)
HGB BLD-MCNC: 9.3 G/DL (ref 11.5–16)
MCH RBC QN AUTO: 29.6 PG (ref 26–34)
MCHC RBC AUTO-ENTMCNC: 32.4 G/DL (ref 30–36.5)
MCV RBC AUTO: 91.4 FL (ref 80–99)
NRBC # BLD: 0 K/UL (ref 0–0.01)
NRBC BLD-RTO: 0 PER 100 WBC
PLATELET # BLD AUTO: 230 K/UL (ref 150–400)
PMV BLD AUTO: 10 FL (ref 8.9–12.9)
POTASSIUM SERPL-SCNC: 4 MMOL/L (ref 3.5–5.1)
PROT SERPL-MCNC: 5.6 G/DL (ref 6.4–8.2)
RBC # BLD AUTO: 3.14 M/UL (ref 3.8–5.2)
SODIUM SERPL-SCNC: 143 MMOL/L (ref 136–145)
WBC # BLD AUTO: 6.1 K/UL (ref 3.6–11)

## 2024-05-01 PROCEDURE — 97535 SELF CARE MNGMENT TRAINING: CPT

## 2024-05-01 PROCEDURE — 85027 COMPLETE CBC AUTOMATED: CPT

## 2024-05-01 PROCEDURE — 2580000003 HC RX 258: Performed by: FAMILY MEDICINE

## 2024-05-01 PROCEDURE — 36415 COLL VENOUS BLD VENIPUNCTURE: CPT

## 2024-05-01 PROCEDURE — 80053 COMPREHEN METABOLIC PANEL: CPT

## 2024-05-01 PROCEDURE — 2580000003 HC RX 258: Performed by: SURGERY

## 2024-05-01 PROCEDURE — 6360000002 HC RX W HCPCS: Performed by: SURGERY

## 2024-05-01 PROCEDURE — C9113 INJ PANTOPRAZOLE SODIUM, VIA: HCPCS | Performed by: SURGERY

## 2024-05-01 PROCEDURE — 6360000002 HC RX W HCPCS: Performed by: INTERNAL MEDICINE

## 2024-05-01 PROCEDURE — 6370000000 HC RX 637 (ALT 250 FOR IP): Performed by: FAMILY MEDICINE

## 2024-05-01 RX ORDER — CIPROFLOXACIN 500 MG/1
500 TABLET, FILM COATED ORAL 2 TIMES DAILY
Qty: 10 TABLET | Refills: 0 | Status: SHIPPED | OUTPATIENT
Start: 2024-05-01 | End: 2024-05-06

## 2024-05-01 RX ORDER — METRONIDAZOLE 500 MG/1
500 TABLET ORAL 3 TIMES DAILY
Qty: 15 TABLET | Refills: 0 | Status: SHIPPED | OUTPATIENT
Start: 2024-05-01 | End: 2024-05-06

## 2024-05-01 RX ADMIN — SODIUM CHLORIDE, PRESERVATIVE FREE 5 ML: 5 INJECTION INTRAVENOUS at 17:13

## 2024-05-01 RX ADMIN — SODIUM CHLORIDE, PRESERVATIVE FREE 10 ML: 5 INJECTION INTRAVENOUS at 17:12

## 2024-05-01 RX ADMIN — METRONIDAZOLE 500 MG: 500 INJECTION, SOLUTION INTRAVENOUS at 03:26

## 2024-05-01 RX ADMIN — ESCITALOPRAM OXALATE 20 MG: 10 TABLET ORAL at 17:11

## 2024-05-01 RX ADMIN — PANTOPRAZOLE SODIUM 40 MG: 40 INJECTION, POWDER, FOR SOLUTION INTRAVENOUS at 17:11

## 2024-05-01 RX ADMIN — CIPROFLOXACIN 400 MG: 2 INJECTION, SOLUTION INTRAVENOUS at 17:11

## 2024-05-01 RX ADMIN — METRONIDAZOLE 500 MG: 500 INJECTION, SOLUTION INTRAVENOUS at 17:11

## 2024-05-01 RX ADMIN — POTASSIUM CHLORIDE AND SODIUM CHLORIDE: 450; 150 INJECTION, SOLUTION INTRAVENOUS at 06:07

## 2024-05-01 ASSESSMENT — PAIN SCALES - GENERAL: PAINLEVEL_OUTOF10: 0

## 2024-05-01 ASSESSMENT — PAIN SCALES - WONG BAKER: WONGBAKER_NUMERICALRESPONSE: NO HURT

## 2024-05-01 NOTE — CARE COORDINATION
Patient planned for discharge home today, with home health. Home health through [facility] health. Orders and notes sent via [facility].        Transition of Care Plan:    RUR: 12%  Prior Level of Functioning: ind  Disposition: home with home health  If SNF or IPR: Date FOC offered: na  Date FOC received: na  Accepting facility: na  Date authorization started with reference number: na  Date authorization received and expires: na  Follow up appointments:   DME needed: 3 in 1 bedside commode  Transportation at discharge: family  IM/IMM Medicare/ letter given: 05/01/2024  Is patient a  and connected with [facility]? na  If yes, was [place] transfer form completed and [facility] notified? na  Caregiver Contact: daughter  Discharge Caregiver contacted prior to discharge?   Care Conference needed? na  Barriers to discharge: pending surgery clearance

## 2024-05-01 NOTE — PROGRESS NOTES
Nephrology follow up          Patient: Britta Quevedo MRN: [id number]  SSN: [id number]    YOB: 1950  Age: 74 y.o.  Sex: female      Subjective:   Pt is seen at the bedside.  She looks comfortable  Good bps  On IVF  Cr 1.24->0.7  Phos 1.7->2.2  Mg 1.9    Past Medical History:   Diagnosis Date    Arthritis     Chronic kidney disease     Depression     Endometriosis     Hyperlipemia      Past Surgical History:   Procedure Laterality Date    BACK SURGERY      CHOLECYSTECTOMY      COLONOSCOPY      COLONOSCOPY N/A 4/22/2024    COLONOSCOPY performed by Julian Pratt MD at [facility] ENDOSCOPY    HYSTERECTOMY (CERVIX STATUS UNKNOWN)      LAPAROSCOPY N/A 4/22/2024    LAPAROSCOPY DIAGNOSTIC, PARTIAL SIGMOID COLECTOMY WITH COLOSTOMY CREATION performed by Berto Yen MD at [facility] MAIN OR    TONSILLECTOMY        Family History   Problem Relation Age of Onset    Dementia Mother     Heart Disease Father     Heart Attack Brother     Brain Cancer Daughter      Social History     Tobacco Use    Smoking status: Never    Smokeless tobacco: Never   Substance Use Topics    Alcohol use: Never      Current Facility-Administered Medications   Medication Dose Route Frequency Provider Last Rate Last Admin    oxyCODONE (ROXICODONE) immediate release tablet 5 mg  5 mg Oral Q4H PRN Tiffany Vasquez MD   5 mg at 04/30/24 2035    diatrizoate meglumine-sodium (GASTROGRAFIN) 66-10 % solution 30 mL  30 mL Oral ONCE PRN Berto Yen MD   30 mL at 04/29/24 0820    [Held by provider] heparin (porcine) injection 5,000 Units  5,000 Units SubCUTAneous 3 times per day Tiffany Vasquez MD        pantoprazole (PROTONIX) injection 40 mg  40 mg IntraVENous Daily Angel Villegas MD   40 mg at 05/01/24 1711    Benzocaine-Menthol (CEPACOL) 1 lozenge  1 lozenge Oral Q2H PRN Angel Villegas MD        potassium chloride 10 mEq/100 mL IVPB (Peripheral Line)  10 mEq IntraVENous Once Tiffany Vasquez MD        0.45 % NaCl with KCl 20 mEq infusion   
         Nephrology follow up          Patient: Britta Quevedo MRN: [id number]  SSN: [id number]    YOB: 1950  Age: 74 y.o.  Sex: female      Subjective:   Pt is seen at the bedside.  She looks comfortable  Good bps  On IVF  Cr 1.24->1.09  Phos 1.7->2.5      Past Medical History:   Diagnosis Date    Arthritis     Chronic kidney disease     Depression     Endometriosis     Hyperlipemia      Past Surgical History:   Procedure Laterality Date    BACK SURGERY      CHOLECYSTECTOMY      COLONOSCOPY      COLONOSCOPY N/A 4/22/2024    COLONOSCOPY performed by Julian Pratt MD at [facility] ENDOSCOPY    HYSTERECTOMY (CERVIX STATUS UNKNOWN)      LAPAROSCOPY N/A 4/22/2024    LAPAROSCOPY DIAGNOSTIC, PARTIAL SIGMOID COLECTOMY WITH COLOSTOMY CREATION performed by Berto Yen MD at [facility] MAIN OR    TONSILLECTOMY        Family History   Problem Relation Age of Onset    Dementia Mother     Heart Disease Father     Heart Attack Brother     Brain Cancer Daughter      Social History     Tobacco Use    Smoking status: Never    Smokeless tobacco: Never   Substance Use Topics    Alcohol use: Never      Current Facility-Administered Medications   Medication Dose Route Frequency Provider Last Rate Last Admin    Benzocaine-Menthol (CEPACOL) 1 lozenge  1 lozenge Oral Q2H PRN Angel Villegas MD        HYDROmorphone HCl PF (DILAUDID) injection 1 mg  1 mg IntraVENous Q4H PRN Tiffany Vasquez MD   1 mg at 04/25/24 2042    potassium chloride 10 mEq/100 mL IVPB (Peripheral Line)  10 mEq IntraVENous Once Tiffany Vasquez MD        0.45 % NaCl with KCl 20 mEq infusion   IntraVENous Continuous Mame Garcia  mL/hr at 04/25/24 1217 New Bag at 04/25/24 1217    sodium chloride flush 0.9 % injection 5-40 mL  5-40 mL IntraVENous 2 times per day Berto Yen MD   10 mL at 04/23/24 2151    sodium chloride flush 0.9 % injection 5-40 mL  5-40 mL IntraVENous PRN Berto Yen MD        0.9 % sodium chloride infusion   IntraVENous PRN Farshad 
         Nephrology follow up          Patient: Britta Quevedo MRN: [id number]  SSN: [id number]    YOB: 1950  Age: 74 y.o.  Sex: female      Subjective:   Pt is seen at the bedside.  She looks comfortable  Good bps  On IVF  Cr 1.24->0.7  Phos 1.7->2.2  Mg 1.9    Past Medical History:   Diagnosis Date    Arthritis     Chronic kidney disease     Depression     Endometriosis     Hyperlipemia      Past Surgical History:   Procedure Laterality Date    BACK SURGERY      CHOLECYSTECTOMY      COLONOSCOPY      COLONOSCOPY N/A 4/22/2024    COLONOSCOPY performed by Julian Pratt MD at [facility] ENDOSCOPY    HYSTERECTOMY (CERVIX STATUS UNKNOWN)      LAPAROSCOPY N/A 4/22/2024    LAPAROSCOPY DIAGNOSTIC, PARTIAL SIGMOID COLECTOMY WITH COLOSTOMY CREATION performed by Berto Yen MD at [facility] MAIN OR    TONSILLECTOMY        Family History   Problem Relation Age of Onset    Dementia Mother     Heart Disease Father     Heart Attack Brother     Brain Cancer Daughter      Social History     Tobacco Use    Smoking status: Never    Smokeless tobacco: Never   Substance Use Topics    Alcohol use: Never      Current Facility-Administered Medications   Medication Dose Route Frequency Provider Last Rate Last Admin    oxyCODONE (ROXICODONE) immediate release tablet 5 mg  5 mg Oral Q4H PRN Tiffany Vasquez MD   5 mg at 04/30/24 2035    diatrizoate meglumine-sodium (GASTROGRAFIN) 66-10 % solution 30 mL  30 mL Oral ONCE PRN Berto Yen MD   30 mL at 04/29/24 0820    [Held by provider] heparin (porcine) injection 5,000 Units  5,000 Units SubCUTAneous 3 times per day Tiffany Vasquez MD        pantoprazole (PROTONIX) injection 40 mg  40 mg IntraVENous Daily Angel Villegas MD   40 mg at 04/30/24 0908    Benzocaine-Menthol (CEPACOL) 1 lozenge  1 lozenge Oral Q2H PRN Angel Villegas MD        potassium chloride 10 mEq/100 mL IVPB (Peripheral Line)  10 mEq IntraVENous Once Tiffany Vasquez MD        0.45 % NaCl with KCl 20 mEq infusion   
         Nephrology follow up          Patient: Britta Quevedo MRN: [id number]  SSN: [id number]    YOB: 1950  Age: 74 y.o.  Sex: female      Subjective:   Pt is seen at the bedside.  She looks comfortable  Good bps  On IVF  Cr 1.24  Phos 1.7      Past Medical History:   Diagnosis Date    Arthritis     Chronic kidney disease     Depression     Endometriosis     Hyperlipemia      Past Surgical History:   Procedure Laterality Date    BACK SURGERY      CHOLECYSTECTOMY      COLONOSCOPY      COLONOSCOPY N/A 4/22/2024    COLONOSCOPY performed by Julian Pratt MD at [facility] ENDOSCOPY    HYSTERECTOMY (CERVIX STATUS UNKNOWN)      LAPAROSCOPY N/A 4/22/2024    LAPAROSCOPY DIAGNOSTIC, PARTIAL SIGMOID COLECTOMY WITH COLOSTOMY CREATION performed by Berto Yen MD at [facility] MAIN OR    TONSILLECTOMY        Family History   Problem Relation Age of Onset    Dementia Mother     Heart Disease Father     Heart Attack Brother     Brain Cancer Daughter      Social History     Tobacco Use    Smoking status: Never    Smokeless tobacco: Never   Substance Use Topics    Alcohol use: Never      Current Facility-Administered Medications   Medication Dose Route Frequency Provider Last Rate Last Admin    Benzocaine-Menthol (CEPACOL) 1 lozenge  1 lozenge Oral Q2H PRN Angel Villegas MD        HYDROmorphone HCl PF (DILAUDID) injection 1 mg  1 mg IntraVENous Q4H PRN Tiffany Vasquez MD   1 mg at 04/24/24 2129    potassium chloride 10 mEq/100 mL IVPB (Peripheral Line)  10 mEq IntraVENous Once Tiffany Vasquez MD        0.45 % NaCl with KCl 20 mEq infusion   IntraVENous Continuous Mame Garcia  mL/hr at 04/24/24 0950 New Bag at 04/24/24 0950    sodium chloride flush 0.9 % injection 5-40 mL  5-40 mL IntraVENous 2 times per day Berto Yen MD   10 mL at 04/23/24 2151    sodium chloride flush 0.9 % injection 5-40 mL  5-40 mL IntraVENous PRN Berto Yen MD        0.9 % sodium chloride infusion   IntraVENous PRN Berto Yen MD  
         Nephrology follow up          Patient: Britta Quevedo MRN: [id number]  SSN: [id number]    YOB: 1950  Age: 74 y.o.  Sex: female      Subjective:   Pt is seen at the bedside.  She looks comfortable  Good bps  On IVF  Cr 1.24->0.7  Phos 1.7->2.3      Past Medical History:   Diagnosis Date    Arthritis     Chronic kidney disease     Depression     Endometriosis     Hyperlipemia      Past Surgical History:   Procedure Laterality Date    BACK SURGERY      CHOLECYSTECTOMY      COLONOSCOPY      COLONOSCOPY N/A 4/22/2024    COLONOSCOPY performed by Julian Pratt MD at [facility] ENDOSCOPY    HYSTERECTOMY (CERVIX STATUS UNKNOWN)      LAPAROSCOPY N/A 4/22/2024    LAPAROSCOPY DIAGNOSTIC, PARTIAL SIGMOID COLECTOMY WITH COLOSTOMY CREATION performed by Berto Yen MD at [facility] MAIN OR    TONSILLECTOMY        Family History   Problem Relation Age of Onset    Dementia Mother     Heart Disease Father     Heart Attack Brother     Brain Cancer Daughter      Social History     Tobacco Use    Smoking status: Never    Smokeless tobacco: Never   Substance Use Topics    Alcohol use: Never      Current Facility-Administered Medications   Medication Dose Route Frequency Provider Last Rate Last Admin    phosphorus (K PHOS NEUTRAL) 1 tablet  250 mg Oral BID Tiffany Vasquez MD   1 tablet at 04/26/24 2036    pantoprazole (PROTONIX) injection 40 mg  40 mg IntraVENous Daily Angel Villegas MD   40 mg at 04/26/24 1304    HYDROmorphone HCl PF (DILAUDID) injection 1 mg  1 mg IntraVENous Q4H PRN Tiffany Vasquez MD   1 mg at 04/27/24 0629    Benzocaine-Menthol (CEPACOL) 1 lozenge  1 lozenge Oral Q2H PRN Angel Villegas MD        potassium chloride 10 mEq/100 mL IVPB (Peripheral Line)  10 mEq IntraVENous Once Tiffany Vasquez MD        0.45 % NaCl with KCl 20 mEq infusion   IntraVENous Continuous Mame Garcia  mL/hr at 04/26/24 2221 New Bag at 04/26/24 2221    sodium chloride flush 0.9 % injection 5-40 mL  5-40 mL IntraVENous 2 
  Physician Progress Note      PATIENT:               TRICIA WHITT  [facility] #:                  [id number]  :                       1950  ADMIT DATE:       2024 9:20 AM  DISCH DATE:  RESPONDING  PROVIDER #:        Tiffany Vasquez MD          QUERY TEXT:    Patient admitted with bowel perforation. Noted documentation of hyponatremia   in  Nephrology consult note. In order to support the diagnosis of   hyponatremia, please include additional clinical indicators in your   documentation.  Or please document if the diagnosis of hyponatremia has been   ruled out after further study.    The medical record reflects the following:  Risk Factors: 74 year old female,  Clinical Indicators:  Nephrology consult - Hyponatremia  From free water deficit and saline infusion  Sodium 146  Will switch to hypotonic IV fluids with Kcl  Na: 146-146  Treatment: IVF 1/2NS with KCl 20meq 100ml/hr      Please email [email or web address] with any questions  Options provided:  -- hyponatremia present as evidenced by, Please document evidence.  -- hyponatremia was ruled out and hypernatremia confirmed  -- Other - I will add my own diagnosis  -- Disagree - Not applicable / Not valid  -- Disagree - Clinically unable to determine / Unknown  -- Refer to Clinical Documentation Reviewer    PROVIDER RESPONSE TEXT:    hyponatremia was ruled out and hypernatremia confirmed after study.    Query created by: Tawanna Ragland on 2024 8:50 AM      Electronically signed by:  Tiffany Vasquez MD 2024 9:18 AM          
1124: Patient came back from procedure in 10/10 pain in the abdomen and back. A STAT KUB was ordered per Dr. Pratt and patient to stay NPO at this time.     1145: called ENDO department for an order for pain medicine and zofran and they gave orders for 2G ancef IV push.     1200: patient stated she was still a 10/10 in her abdomen. BP is soft at 113/60     1253: KUB showed no free air and no obstruction. Dr. Pratt at the bedside to talk to patient and family (daughter) explained possible perforation and that he ordered a STAT CT of abdomen and pelvis without contrast due to her renal function.     1500: called the offsite radiologist and they stated the CT would be read within 15 minutes.      
4 Eyes Skin Assessment     NAME:  Britta Quevedo  YOB: 1950  MEDICAL RECORD NUMBER:  [id number]    The patient is being assessed for  Other weekly    I agree that at least one RN has performed a thorough Head to Toe Skin Assessment on the patient. ALL assessment sites listed below have been assessed.      Areas assessed by both nurses:    Head, Face, Ears, Shoulders, Back, Chest, Arms, Elbows, Hands, Sacrum. Buttock, Coccyx, Ischium, Legs. Feet and Heels, and Under Medical Devices         Does the Patient have a Wound? Yes wound(s) were present on assessment. LDA wound assessment was Initiated and completed by RN       Celestine Prevention initiated by RN: Yes  Wound Care Orders initiated by RN: No    Pressure Injury (Stage 3,4, Unstageable, DTI, NWPT, and Complex wounds) if present, place Wound referral order by RN under : No    New Ostomies, if present place, Ostomy referral order under : Yes     Nurse 1 eSignature: Electronically signed by Breanna Huffman LPN on 4/24/24 at 2:55 AM EDT    **SHARE this note so that the co-signing nurse can place an eSignature**    Nurse 2 eSignature: Electronically signed by GIGI WILSON RN on 4/24/24 at 2:57 AM EDT   
4 Eyes Skin Assessment     NAME:  Britta Quevedo  YOB: 1950  MEDICAL RECORD NUMBER:  [id number]    The patient is being assessed for  Other weekly    I agree that at least one RN has performed a thorough Head to Toe Skin Assessment on the patient. ALL assessment sites listed below have been assessed.      Areas assessed by both nurses:    Head, Face, Ears, Shoulders, Back, Chest, Arms, Elbows, Hands, Sacrum. Buttock, Coccyx, Ischium, Legs. Feet and Heels, and Under Medical Devices         Does the Patient have a Wound? No noted wound(s)       Celestine Prevention initiated by RN: Yes  Wound Care Orders initiated by RN: No    Pressure Injury (Stage 3,4, Unstageable, DTI, NWPT, and Complex wounds) if present, place Wound referral order by RN under : No    New Ostomies, if present place, Ostomy referral order under : Yes     Nurse 1 eSignature: Electronically signed by Kari Gaspar RN on 4/24/24 at 6:31 PM EDT    **SHARE this note so that the co-signing nurse can place an eSignature**    Nurse 2 eSignature: Electronically signed by Manda Ryder RN on 4/24/24 at 6:32 PM EDT   
4 Eyes Skin Assessment     NAME:  Britta Quevedo  YOB: 1950  MEDICAL RECORD NUMBER:  [id number]    The patient is being assessed for  Other weekly skin assessment    I agree that at least one RN has performed a thorough Head to Toe Skin Assessment on the patient. ALL assessment sites listed below have been assessed.      Areas assessed by both nurses:    Head, Face, Ears, Shoulders, Back, Chest, Arms, Elbows, Hands, Sacrum. Buttock, Coccyx, Ischium, Legs. Feet and Heels, and Under Medical Devices         Does the Patient have a Wound? No noted wound(s)       Celestine Prevention initiated by RN: Yes  Wound Care Orders initiated by RN: No    Pressure Injury (Stage 3,4, Unstageable, DTI, NWPT, and Complex wounds) if present, place Wound referral order by RN under : No    New Ostomies, if present place, Ostomy referral order under : No     Nurse 1 eSignature: Electronically signed by Romana Grady RN on 5/1/24 at 12:21 AM EDT    **SHARE this note so that the co-signing nurse can place an eSignature**    Nurse 2 eSignature: Electronically signed by GIGI WILSON, RN on 5/1/24 at 1:40 AM EDT   
4 Eyes Skin Assessment     NAME:  Britta Quevedo  YOB: 1950  MEDICAL RECORD NUMBER:  [id number]    The patient is being assessed for  Admission    I agree that at least one RN has performed a thorough Head to Toe Skin Assessment on the patient. ALL assessment sites listed below have been assessed.      Areas assessed by both nurses:    Head, Face, Ears, Shoulders, Back, Chest, Arms, Elbows, Hands, Sacrum. Buttock, Coccyx, Ischium, Legs. Feet and Heels, and Under Medical Devices         Does the Patient have a Wound? Yes wound(s) were present on assessment. LDA wound assessment was Initiated and completed by RN       Celestine Prevention initiated by RN: Yes  Wound Care Orders initiated by RN: No    Pressure Injury (Stage 3,4, Unstageable, DTI, NWPT, and Complex wounds) if present, place Wound referral order by RN under : No    New Ostomies, if present place, Ostomy referral order under : Yes     Nurse 1 eSignature: Electronically signed by GIGI WILSON RN on 4/23/24 at 12:36 AM EDT    **SHARE this note so that the co-signing nurse can place an eSignature**    Nurse 2 eSignature: Electronically signed by Sobia Delgadillo RN on 4/23/24 at 6:56 AM EDT   
5/1/24 8012 The patient has a discharge order to go home with home health through [facility] if cleared by surgery. I called Dr Farshad HARRIS to see if the patient can be discharged per drs order and he said yes she can go d/c home with home health care but to follow up with him in 2 weeks.   
Assessment and Plan      Principal Problem:    Bowel perforation (HCC)  Active Problems:    Perforation bowel (HCC)  Resolved Problems:    * No resolved hospital problems. *     POD#2 S/P laparoscopic partial sigmoid colectomy with colostomy  Colostomy bag with minimal drainage, no flatus  NPO except ice chips and sips of water until passes more flatus in the ostomy bag  Ostomy is pink and viable  NG tube in place to low intermittent   Continue I's and O's and replace fluids as needed  Await return of bowel function  Discussed plan of care with patient, and her family     04/25/24     POD#3 S/P laparoscopic partial sigmoid colectomy with colostomy  Patient reports feeling better with less pain  Colostomy still with no flatus, no output  Colostomy pink and viable  NG tube in place   Ok to have ice chips and sips of water  Await return of bowel function  Increase activity  Adequate pain control  Check am labs     04/26/24  POD#4 S/P laparoscopic partial sigmoid colectomy with colostomy  Patient reports feeling better with less pain  Colostomy still with no flatus, no output  Colostomy pink and viable  NG tube in place   Some coffee grounds in NG tube, start IV Protonix and check H/H  Hold off Lovenox  Ok to have ice chips and sips of water  Await return of bowel function  Increase activity  Adequate pain control  Check am labs  4/27  Coffee grounds in ng tube  Hold accessory anticoagulation   Trend H/H  No output from ostomy yet  Dr Yen to assume care past weekend  Angel Villegas MD, FACS ( covering for Dr Yen)  
Bedside Shift report given to Ricky WEAVER   
Comprehensive Nutrition Assessment    Type and Reason for Visit:  Initial    Nutrition Recommendations/Plan:   Monitor diet tolerance - starting Reg diet today  Consider nutrition r/t education re: colostomy as applicable     Malnutrition Assessment:  Malnutrition Status:  No malnutrition (04/30/24 1703)    Context:  Acute Illness     Findings of the 6 clinical characteristics of malnutrition:  Energy Intake:  Unable to assess  Weight Loss:  No significant weight loss     Body Fat Loss:  Unable to assess     Muscle Mass Loss:  Unable to assess    Fluid Accumulation:  Unable to assess     Strength:  Not Performed    Nutrition Assessment:    Pt BMI 35.18; weight stable; s/p colonoscopy - starting po Regular diet today. +BS; soft abdomen per MD notes. no N+V monitor tolerance of Reg po diet.    Nutrition Related Findings:    NFPE deferred Wound Type: Surgical Incision       Current Nutrition Intake & Therapies:    Average Meal Intake: Unable to assess  Average Supplements Intake: None Ordered  ADULT DIET; Regular    Anthropometric Measures:  Height: 149.9 cm (4' 11.02\")  Ideal Body Weight (IBW): 95 lbs (43 kg)    Admission Body Weight: 79 kg (174 lb 2.6 oz)  Current Body Weight: 79 kg (174 lb 2.6 oz), 183.3 % IBW. Weight Source: Bed Scale  Current BMI (kg/m2): 35.2        Weight Adjustment For: No Adjustment                 BMI Categories: Obese Class 2 (BMI 35.0 -39.9)    Estimated Daily Nutrient Needs:  Energy Requirements Based On: Kcal/kg  Weight Used for Energy Requirements: Admission  Energy (kcal/day): 2000 kcals/d (25kcals/kg/d)  Weight Used for Protein Requirements: Admission  Protein (g/day): 79-95 gm/d  Method Used for Fluid Requirements: 1 ml/kcal  Fluid (ml/day): 1900 cc (1ml/kcal/d)    Nutrition Diagnosis:   In context of acute illness or injury related to acute injury/trauma, other (comment) (s/p colostomy) as evidenced by GI abnormality    Nutrition Interventions:   Food and/or Nutrient Delivery: 
General Daily Progress Note      Patient Name:   Britta Quevedo       YOB: 1950       Age:  74 y.o.      Admit Date: 4/22/2024      Subjective:     CHIEF COMPLAINT:      Bowel perforation           HISTORY OF PRESENT ILLNESS:     General Daily Progress Note  Patient is a 74 y.o. year old female history of hypertension chronic kidney disease hyperlipidemia arthritis depression came to outpatient colonoscopy, post colonoscopy patient developed severe abdominal pain no nausea no vomiting no fever no chills patient have a CT scan of the abdomen pelvis without contrast done bowel perforation likely originating in the sigmoid colon with extraluminal air, GI doctor consulted surgeon, patient had laparoscopic diagnostic partial sigmoid colectomy and colostomy       4/23  Pt was asleep but is still experiencing some postop abdominal pain. She has not had bowel movement or passed gas.   Lactic acid 2.2  Potassium 3.4, replace potassium        Objective:     Vitals:    04/23/24 1146   BP:    Pulse: 92   Resp:    Temp:    SpO2: 97%        Recent Results (from the past 24 hour(s))   CBC with Auto Differential    Collection Time: 04/23/24  8:03 AM   Result Value Ref Range    WBC 12.1 (H) 3.6 - 11.0 K/uL    RBC 3.44 (L) 3.80 - 5.20 M/uL    Hemoglobin 10.6 (L) 11.5 - 16.0 g/dL    Hematocrit 31.9 (L) 35.0 - 47.0 %    MCV 92.7 80.0 - 99.0 FL    MCH 30.8 26.0 - 34.0 PG    MCHC 33.2 30.0 - 36.5 g/dL    RDW 13.5 11.5 - 14.5 %    Platelets 197 150 - 400 K/uL    MPV 10.4 8.9 - 12.9 FL    Nucleated RBCs 0.0 0.0  WBC    nRBC 0.00 0.00 - 0.01 K/uL    Neutrophils % 92 (H) 32 - 75 %    Lymphocytes % 2 (L) 12 - 49 %    Monocytes % 6 5 - 13 %    Eosinophils % 0 0 - 7 %    Basophils % 0 0 - 1 %    Immature Granulocytes % 0 0 - 0.5 %    Neutrophils Absolute 11.0 (H) 1.8 - 8.0 K/UL    Lymphocytes Absolute 0.2 (L) 0.8 - 3.5 K/UL    Monocytes Absolute 0.8 0.0 - 1.0 K/UL    Eosinophils Absolute 0.0 0.0 - 0.4 K/UL    Basophils 
General Daily Progress Note      Patient Name:   Britta Quevedo       YOB: 1950       Age:  74 y.o.      Admit Date: 4/22/2024      Subjective:     CHIEF COMPLAINT:      Bowel perforation           HISTORY OF PRESENT ILLNESS:     General Daily Progress Note  Patient is a 74 y.o. year old female history of hypertension chronic kidney disease hyperlipidemia arthritis depression came to outpatient colonoscopy, post colonoscopy patient developed severe abdominal pain no nausea no vomiting no fever no chills patient have a CT scan of the abdomen pelvis without contrast done bowel perforation likely originating in the sigmoid colon with extraluminal air, GI doctor consulted surgeon, patient had laparoscopic diagnostic partial sigmoid colectomy and colostomy       4/23  Pt was asleep but is still experiencing some postop abdominal pain. She has not had bowel movement or passed gas.   Lactic acid 2.2  Potassium 3.4, replace potassium    4/24  Pt is still experiencing some abdominal pain.  She states she had a coughing fit this morning which also caused her to feel nauseous.  This happened while she was eating an ice chip.  She has also been feeling very tired and is wondering when she will be moving around out of bed.  She denies bowel movement and flatus at this time. Pt denies chest pain and SOB and fever/chills, vomiting.      Nephrology following and managing electrolytes watching creatinine.  Surgery following    4/25  Pt states she is still having some abdominal pain and denies passing any gas or BM at this time. She denies N/V and chest pain and sob.    Gen Surg: Following, awaiting return of bowel function, NPO except ice chips for now  Neph: following and managing electrolytes watching creatinine.    Objective:     Vitals:    04/25/24 0237   BP: 130/67   Pulse: 91   Resp: 18   Temp: 98.6 °F (37 °C)   SpO2: 99%        Recent Results (from the past 24 hour(s))   CBC    Collection Time: 04/25/24  5:01 
General Daily Progress Note      Patient Name:   Britta Quevedo       YOB: 1950       Age:  74 y.o.      Admit Date: 4/22/2024      Subjective:     CHIEF COMPLAINT:      Bowel perforation           HISTORY OF PRESENT ILLNESS:     General Daily Progress Note  Patient is a 74 y.o. year old female history of hypertension chronic kidney disease hyperlipidemia arthritis depression came to outpatient colonoscopy, post colonoscopy patient developed severe abdominal pain no nausea no vomiting no fever no chills patient have a CT scan of the abdomen pelvis without contrast done bowel perforation likely originating in the sigmoid colon with extraluminal air, GI doctor consulted surgeon, patient had laparoscopic diagnostic partial sigmoid colectomy and colostomy       4/23  Pt was asleep but is still experiencing some postop abdominal pain. She has not had bowel movement or passed gas.   Lactic acid 2.2  Potassium 3.4, replace potassium    4/24  Pt is still experiencing some abdominal pain.  She states she had a coughing fit this morning which also caused her to feel nauseous.  This happened while she was eating an ice chip.  She has also been feeling very tired and is wondering when she will be moving around out of bed.  She denies bowel movement and flatus at this time. Pt denies chest pain and SOB and fever/chills, vomiting.      Nephrology following and managing electrolytes watching creatinine.  Surgery following    4/25  Pt states she is still having some abdominal pain and denies passing any gas or BM at this time. She denies N/V and chest pain and sob.  Patient still have NG tube with suctioning and also DANIEL tube with drainage    Gen Surg: Following, awaiting return of bowel function, NPO except ice chips for now  Neph: following and managing electrolytes watching creatinine.    4/26  Pt states she is about the same as yesterday and still has not had any BM or flatus. She denies N/V/F/C. States she had 
Notified Dr Villegas at 1125 of patient having cornelia blood in colostomy output and ng tube output. Acknowledged and RN relayed AM hgb. Clarified if PO medication should be held, MD order ok to give but hold anticoagulation.  
OCCUPATIONAL THERAPY TREATMENT  Patient: Britta Quevedo (74 y.o. female)  Date: 5/1/2024  Primary Diagnosis: Abdominal pain, unspecified abdominal location [R10.9]  Change in bowel habits [R19.4]  Rectal bleeding [K62.5]  Abnormal virtual colonoscope [R93.3]  Bowel perforation (HCC) [K63.1]  Perforation bowel (HCC) [K63.1]  Procedure(s) (LRB):  COLONOSCOPY (N/A) 9 Days Post-Op   Precautions: Fall Risk, NPO, Bed Alarm                Recommendations for nursing mobility: Out of bed to chair for meals, Encourage HEP in prep for ADLs/mobility; see handout for details, AD and gt belt for bed to chair , Amb to bathroom with AD and gait belt, and Assist x1    In place during session: None  Chart, occupational therapy assessment, plan of care, and goals were reviewed.  ASSESSMENT  Patient continues with skilled OT services and is progressing towards goals. Pt semi supine upon WHITLEY arrival, agreeable to session. Pt A&O x 4. Pt SBA for all bed mobility and functional transfers.  Pt had HOB elevated, therapist suggest trying with bed flat and no bed rail.  Pt declined at this time. Therapist reviewed log rolling.  Pt verbalized understanding.  Pt sat EOB to don briefs.  Pt reported her L LE was more difficult to dress and started donning briefs on R LE.  Therapist educated pt to don LLE first and undress last. Pt instructed in using a reacher (pt has one and reports knowing how to use) or scooting L hip back in bed to bring L LE up on bed with knee flexed.  Pt demonstrated understanding and was able to don briefs over L foot.  Pt able to lift R LE to don briefs. Pt stood to pull to  waist.  Pt able to doff/don non-skid slipper socks using the same technique. Pt used walker to ambulate into the bathroom and was SBA for functional ambulation and for commode transfer without using grab (just walker).  Pt was independent with hygiene and clothing management.  Pt ambulated to sink to wash hands.  Pt ambulated back to room and to 
OT eval order received and acknowledged, attempted at 1003 however upon answering questions pt noted to have increased drowsiness and decreased alertness. Nsg in room and report pt was just administered IV dilaudid. Will attempt session at a later time. Will continue to follow pt and attempt OT eval at a later time as medically appropriate. Thank you.    
PROGRESS NOTE      Chief Complaints:  No new complaints.  HPI and  Objective:    Colostomy is working well.  Abdomen soft.  Review of Systems:  Rest of review of system reviewed personally and they are negative.    EXAM:  BP (!) 144/69   Pulse 77   Temp 97.7 °F (36.5 °C) (Oral)   Resp 18   Ht 1.499 m (4' 11\")   Wt 79 kg (174 lb 2.6 oz)   SpO2 96%   BMI 35.18 kg/m²     Patient is awake   Head and neck atraumatic, normocephalic.  ENT: No hoarse voice, gaze appropriate.  Cardiac system regular rate rhythm.  Pulmonary no audible wheeze, no cyanosis.  Chest wall excursion normal with respiration cycle  Abdomen is soft not particularly distended.  Neurologically nonfocal.  Hematology system: No bruising noted.  Musculoskeletal system: No joint deformity noted.  Genitourinary system: No hematuria noted.  Skin is warm and moist.  Psychosocial: Cooperative.  Vascular examination as previously noted no changes.    Current Facility-Administered Medications   Medication Dose Route Frequency Provider Last Rate Last Admin    oxyCODONE (ROXICODONE) immediate release tablet 5 mg  5 mg Oral Q4H PRN Tiffany Vasquez MD   5 mg at 04/30/24 0908    diatrizoate meglumine-sodium (GASTROGRAFIN) 66-10 % solution 30 mL  30 mL Oral ONCE PRN Berto Yen MD   30 mL at 04/29/24 0820    [Held by provider] heparin (porcine) injection 5,000 Units  5,000 Units SubCUTAneous 3 times per day Tiffany Vasquez MD        pantoprazole (PROTONIX) injection 40 mg  40 mg IntraVENous Daily Angel Villegas MD   40 mg at 04/30/24 0908    Benzocaine-Menthol (CEPACOL) 1 lozenge  1 lozenge Oral Q2H PRN Angel Villegas MD        potassium chloride 10 mEq/100 mL IVPB (Peripheral Line)  10 mEq IntraVENous Once Tiffany Vasquez MD        0.45 % NaCl with KCl 20 mEq infusion   IntraVENous Continuous Mame Garcia  mL/hr at 04/30/24 0520 100 mL/hr at 04/30/24 0520    sodium chloride flush 0.9 % injection 5-40 mL  5-40 mL IntraVENous 2 times per day Farshad 
PT eval order received and acknowledged. PT eval attempted at 1010 however pt received IV pain medication during A&O and PLOF questions, demonstrated decreased alertness and currently unable to participate at this time. Will continue to follow patient and attempt PT eval at a later time. Thank you.    
Patient and family educated on amount of water patient is taking in. Patient has been observed taking multiple gulps of water frequently. Advised on order of ice chips or sips of clear liquid in order to give abdomen time to rest. Patient and family verbalized understanding. Mouth swabs provided for dry mouth.  
Patient going to OR. Updated 5E nurse (527). Updated PACU on room assignment.   
Patient refused CHG bath this am and turning and repositioning throughout night. Patient educated and need to reposition to avoid skin breakdown.  
Patient to go to surgery this evening. Consents obtained with daughter at bedside.   
Provider Christina notified of lactic 2.2, no new orders given   
Pt noted to have had a large brown watery bowel movement. Pt cleaned and ostomy bag replaced due to leaking. Dr. Yen notified.   
Received nephrology consultation  Underlying chronic kidney disease  Bowel perforation  Lap sigmoid colectomy with colostomy  Labs are pending  Hold lisinopril  IV fluids    
SURGERY PROGRESS NOTE      Admit Date: 2024    POD 2 Days Post-Op    Procedure: Procedure(s):  COLONOSCOPY      Subjective:   Feels better than yesterday but uncomfortable due to NG tube in place    Objective:     BP (!) 141/60   Pulse 95   Temp 97.7 °F (36.5 °C) (Oral)   Resp 18   Ht 1.499 m (4' 11\")   Wt 74.5 kg (164 lb 3.9 oz)   SpO2 96%   BMI 33.17 kg/m²      Temp (24hrs), Av.6 °F (37 °C), Min:97.7 °F (36.5 °C), Max:99.1 °F (37.3 °C)       07 -  190  In: -   Out: 400   1901 -  07  In: -   Out: 2786 [Urine:1650; Drains:126]      Assessment and Plan     Principal Problem:    Bowel perforation (HCC)  Active Problems:    Perforation bowel (HCC)  Resolved Problems:    * No resolved hospital problems. *    POD#2 S/P laparoscopic partial sigmoid colectomy with colostomy  Colostomy bag with minimal drainage, no flatus  NPO except ice chips and sips of water until passes more flatus in the ostomy bag  Ostomy is pink and viable  NG tube in place to low intermittent   Continue I's and O's and replace fluids as needed  Await return of bowel function  Discussed plan of care with patient, and her family    Angel Villegas MD, FACS  
SURGERY PROGRESS NOTE      Admit Date: 2024    POD 3 Days Post-Op    Procedure: Procedure(s):  S/P laparoscopic partial sigmoid colectomy with colostomy    Subjective:     Patient reports feeling better with less pain  Colostomy still with no flatus, no output  Colostomy pink and viable  NG tube in place   Ok to have ice chips and sips of water  Await return of bowel function  Increase activity  Adequate pain control  Check am labs      Objective:     BP (!) 137/51   Pulse 86   Temp 99.3 °F (37.4 °C) (Oral)   Resp 18   Ht 1.499 m (4' 11\")   Wt 77.2 kg (170 lb 3.1 oz)   SpO2 95%   BMI 34.38 kg/m²      Temp (24hrs), Av.3 °F (36.8 °C), Min:97.7 °F (36.5 °C), Max:99.3 °F (37.4 °C)      1901 -  0700  In: -   Out: 1025 [Drains:25]   0701 -  1900  In: 2075.8 [I.V.:1135]  Out: 3242.5 [Urine:1225; Drains:37.5]      Assessment and Plan      Principal Problem:    Bowel perforation (HCC)  Active Problems:    Perforation bowel (HCC)  Resolved Problems:    * No resolved hospital problems. *     POD#2 S/P laparoscopic partial sigmoid colectomy with colostomy  Colostomy bag with minimal drainage, no flatus  NPO except ice chips and sips of water until passes more flatus in the ostomy bag  Ostomy is pink and viable  NG tube in place to low intermittent   Continue I's and O's and replace fluids as needed  Await return of bowel function  Discussed plan of care with patient, and her family    24    POD#3 S/P laparoscopic partial sigmoid colectomy with colostomy  Patient reports feeling better with less pain  Colostomy still with no flatus, no output  Colostomy pink and viable  NG tube in place   Ok to have ice chips and sips of water  Await return of bowel function  Increase activity  Adequate pain control  Check am labs    Angel Villegas MD, FACS ( covering for Dr Yen)  
Spiritual Care Assessment/Progress Note  [facility]    Name: Britta Quevedo MRN: [id number]    Age: 74 y.o.     Sex: female   Language: English     Date: 5/1/2024            Total Time Calculated: 35 min              Spiritual Assessment begun in [facility] SURGICAL  Service Provided For: Patient  Referral/Consult From: Rounding  Encounter Overview/Reason: Initial Encounter    Spiritual beliefs:      [x] Involved in a geovanna tradition/spiritual practice: Scientology      [] Supported by a geovanna community:      [] Claims no spiritual orientation:      [] Seeking spiritual identity:           [] Adheres to an individual form of spirituality:      [] Not able to assess:                Identified resources for coping and support system:   Support System: Children       [x] Prayer                  [] Devotional reading               [x] Music                  [] Guided Imagery     [] Pet visits                                        [] Other: (COMMENT)     Specific area/focus of visit   Encounter:    Crisis:    Spiritual/Emotional needs: Type: Spiritual Support  Ritual, Rites and Sacraments: Type: Blessings  Grief, Loss, and Adjustments: Type: Grief and loss  Ethics/Mediation:    Behavioral Health:    Palliative Care:    Advance Care Planning:      Plan/Referrals: Other (Comment) ( is available if needed)    Narrative: SA- Regular visit in [facility]. The patient was calmly present during the visit. The patient lost her beloved daughter 3 years ago. The patient shared about her grieving process.  provided a comforting presence with an empathetic listening. The patient and  had a spiritual and theological conversation during the process of grief and loss.     The patient shared about her favorite song (Dancing in the Justin) with .  played the song and listened it with the patient. The patient and  continued to have a spiritual conversation about the eternal hope with 
Tried to call Dr. Yen 4x to inform CXR result for NGT placement but unable to reach him.    Called Dr. Vasquez and read back CXR result for NGT placement. He ordered to pull the NGT tube a little bit and repeat portable CXR.  
H/H  Hold off Lovenox  Ok to have ice chips and sips of water  Await return of bowel function  Increase activity  Adequate pain control  Check am labs    4/27  Coffee grounds in ng tube  Hold accessory anticoagulation   Trend H/H  No output from ostomy yet  Dr Yen to assume care past weekend    4/28/24  Feels much better today  No output from the ostomy yet  DANIEL drain with serous fluid  NG output is not bloody  H/H stable at 8.3/25.7  Persistent ileus and no signs of return of bowel function  Dr Yen, primary surgeon, is taking over care 7 am Monday 4/29    Angel Villegas MD, FACS      Angel Villegas MD, FACS ( covering for Dr Yen)  
ice chips and sips of water  Await return of bowel function  Increase activity  Adequate pain control  Check am labs  Angel Villegas MD, FACS ( covering for Dr Yen)    
(L) 1.1 - 2.2       [unfilled]        Review of Systems    Constitutional: Negative for chills and fever.   HENT: Negative.    Eyes: Negative.    Respiratory: Negative.    Cardiovascular: Negative.    Gastrointestinal: Negative for abdominal pain and nausea.   Skin: Negative.    Neurological: Negative.        Physical Exam:      Constitutional: pt is oriented to person, place, and time.   HENT:   Head: Normocephalic and atraumatic.   Eyes: Pupils are equal, round, and reactive to light. EOM are normal.   Cardiovascular: Normal rate, regular rhythm and normal heart sounds.   Pulmonary/Chest: Breath sounds normal. No wheezes. No rales.   Exhibits no tenderness.   Abdominal: Soft. Bowel sounds are normal. There is no abdominal tenderness. There is no rebound and no guarding.   Musculoskeletal: Normal range of motion.   Neurological: pt is alert and oriented to person, place, and time.     XR CHEST PORTABLE   Final Result   Nasogastric tube tip unchanged, extending to the distal stomach.      XR CHEST PORTABLE   Final Result   Unchanged position of nasogastric tube extending to the distal stomach.      XR CHEST PORTABLE   Final Result   Nasogastric tube extends to the distal stomach. Technical factors as above with   low lung volumes and persistent bibasilar airspace disease.      CT ABDOMEN PELVIS WO CONTRAST Additional Contrast? None   Final Result         1. Bowel perforation likely originating from the sigmoid colon with extraluminal   air.   2. 3 mm left lower lobe lung nodule.      Guidelines by the Fleischner society (Radiology 2017 special report) suggest   that patients with low risk for lung cancer and solid nodule(s) less than or   equal to 6 mm in diameter require no follow-up.  In patients with a higher risk,   such as smokers, follow-up noncontrast chest CT should be considered at 12   months.  Patients with a known malignancy are at increased risk for metastasis   and should receive a three month 
650 mg  650 mg Oral Q6H PRN Tiffany Vasquez MD        Or    acetaminophen (TYLENOL) suppository 650 mg  650 mg Rectal Q6H PRN Tiffany Vasquez MD        labetalol (NORMODYNE;TRANDATE) injection 10 mg  10 mg IntraVENous Q4H PRN Mame Garcia MD               Recent Results (from the past 24 hour(s))   CBC    Collection Time: 04/29/24  6:29 AM   Result Value Ref Range    WBC 5.2 3.6 - 11.0 K/uL    RBC 2.72 (L) 3.80 - 5.20 M/uL    Hemoglobin 8.1 (L) 11.5 - 16.0 g/dL    Hematocrit 25.0 (L) 35.0 - 47.0 %    MCV 91.9 80.0 - 99.0 FL    MCH 29.8 26.0 - 34.0 PG    MCHC 32.4 30.0 - 36.5 g/dL    RDW 13.2 11.5 - 14.5 %    Platelets 204 150 - 400 K/uL    MPV 10.0 8.9 - 12.9 FL    Nucleated RBCs 0.0 0.0  WBC    nRBC 0.00 0.00 - 0.01 K/uL       ASSESSMENT:   Patient is 74 y.o. with diagnosis of : Principal Problem:    Bowel perforation (HCC)  Active Problems:    Perforation bowel (HCC)  Resolved Problems:    * No resolved hospital problems. *      PLAN:                 Colostomy has no output yet.    I will order a CT scan abdomen pelvis with oral contrast with a 3-hour bowel prep.      
mg Oral Q8H PRN Tiffany Vasquez MD        Or    ondansetron (ZOFRAN) injection 4 mg  4 mg IntraVENous Q6H PRN Tiffany Vasquez MD        polyethylene glycol (GLYCOLAX) packet 17 g  17 g Oral Daily PRN Tiffany Vasquez MD        acetaminophen (TYLENOL) tablet 650 mg  650 mg Oral Q6H PRN Tiffany Vasquez MD        Or    acetaminophen (TYLENOL) suppository 650 mg  650 mg Rectal Q6H PRN Tiffany Vasquez MD        labetalol (NORMODYNE;TRANDATE) injection 10 mg  10 mg IntraVENous Q4H PRN Mame Garcia MD            Allergies   Allergen Reactions    Meperidine      Other reaction(s): Unknown (comments)    Sulfa Antibiotics      Other reaction(s): Unknown (comments)       Review of Systems:  A comprehensive review of systems was negative except for that written in the History of Present Illness.    Objective:     Vitals:    04/29/24 0927 04/29/24 1459 04/29/24 1545 04/29/24 2100   BP: (!) 151/72  131/70 (!) 140/62   Pulse: 74  74 84   Resp: 18 18 16 18   Temp: 97.7 °F (36.5 °C)  97.5 °F (36.4 °C) 97.7 °F (36.5 °C)   TempSrc: Oral  Oral Oral   SpO2:   99% 99%   Weight:       Height:            Physical Exam:  General: NAD  Eyes: sclera anicteric  Oral Cavity: No thrush or ulcers  Neck: no JVD  Chest: Fair bilateral air entry  Heart: normal sounds  Abdomen: soft and non tender , ostomy  : no montez  Lower Extremities: no edema  Skin: no rash  Neuro: intact  Psychiatric: non-depressed          Assessment/Plan:     Chronic kidney disease stage III A  Creatinine is 1.2-->0.5 today  Her creatinine was 1.3 in 2021.  Etiology of her CKD is presumed to be hypertensive renal vascular disease.  Clinically euvolemic  On no potential nephrotoxins  IV fluids    Hypokalemia  GI loss  K 3.3-->3.6  IV KCl replacement  Magnesium level 1.5->1.9  Received Iv Mg 2 gm x 1  Continue kcl in IVF    Hyponatremia  From free water deficit and saline infusion  Sodium 146-->139  C/w hypotonic IV fluids with Kcl    Hypophosphatemia  Phosphorus 
please be advised this is a medical document.  It is intended  as peer to peer communication. It is written in the medical language and may contain abbreviation or verbiage that are unfamiliar. It may appear blunt or direct.  Medical documents are intended to carry relevant information, facts as evident.  And the clinic opinions of the practitioner.  This note maybe transcribed  using a voice dictation system, voice-recognition errors may occur, this should not be taken to alter the contents or meaning for this note.    Cc Referring Physician   Demetrius Yoo, DEYSI - NP     
Order Status: Completed Specimen: Blood Updated: 04/29/24 1445     Special Requests --        No Special Requests  Left  Right  Antecubital  Hand       Culture No growth 6 days       Culture, Urine [[id number]] Collected: 04/22/24 2115    Order Status: Canceled Specimen: Urine     Clostridium Difficile Toxin/Antigen [[id number]] Collected: 04/22/24 2100    Order Status: Canceled Specimen: Stool     Culture, Urine [[id number]] Collected: 04/22/24 1745    Order Status: Completed Specimen: Urine, indwelling catheter Updated: 04/24/24 1104     Special Requests No Special Requests        Culture No Growth (<1000 cfu/mL)                XR CHEST PORTABLE    Result Date: 4/23/2024  Nasogastric tube tip unchanged, extending to the distal stomach.    XR CHEST PORTABLE    Result Date: 4/23/2024  Unchanged position of nasogastric tube extending to the distal stomach.    XR CHEST PORTABLE    Result Date: 4/22/2024  Nasogastric tube extends to the distal stomach. Technical factors as above with low lung volumes and persistent bibasilar airspace disease.    CT ABDOMEN PELVIS WO CONTRAST Additional Contrast? None    Result Date: 4/22/2024  1. Bowel perforation likely originating from the sigmoid colon with extraluminal air. 2. 3 mm left lower lobe lung nodule. Guidelines by the Fleischner society (Radiology 2017 special report) suggest that patients with low risk for lung cancer and solid nodule(s) less than or equal to 6 mm in diameter require no follow-up.  In patients with a higher risk, such as smokers, follow-up noncontrast chest CT should be considered at 12 months.  Patients with a known malignancy are at increased risk for metastasis and should receive a three month follow-up. Findings discussed with Dr. Pratt by myself over the telephone at 3:45 p.m. 4/22/2024.    XR ABDOMEN (2 VIEWS)    Result Date: 4/22/2024  Gas pattern is nonobstructed. No free air is identified.          Labs:     Recent Labs     04/29/24  0625 
\"PO2\" in the last 72 hours.  No results for input(s): \"CKMB\", \"CKMBINDEX\", \"TROPHS\" in the last 72 hours.    Invalid input(s): \"TOTALCK\", \"TROPININ\"  No results found for: \"CHOL\", \"TRIG\", \"HDL\", \"LDLCHOLESTEROL\", \"LDLCALC\", \"VLDL\", \"CHOLHDLRATIO\"  No results found for: \"POCGLU\"  No results found for: \"COLORU\", \"CLARITYU\", \"GLUCOSEU\", \"BILIRUBINUR\", \"KETUA\", \"SPECGRAV\", \"BLOODU\", \"PHUR\", \"PROTEINU\", \"NITRU\", \"LEUKOCYTESUR\"      Assessment:   Status post laparoscopic sigmoid colectomy with colostomy  Status post bowel perforation  History of hypertension  Chronic kidney disease  Arthritis  Depression  Chronic back pain from back surgery years ago        Plan:     Patient n.p.o.  IV fluids  Patient was started on Flagyl and Cipro by the surgery, continue  Repeat the labs in the morning     Discussed with patient daughter        Current Facility-Administered Medications:     sodium chloride flush 0.9 % injection 5-40 mL, 5-40 mL, IntraVENous, 2 times per day, Berto Yen MD    sodium chloride flush 0.9 % injection 5-40 mL, 5-40 mL, IntraVENous, PRN, Berto Yen MD    0.9 % sodium chloride infusion, , IntraVENous, PRN, Berto Yen MD    ondansetron (ZOFRAN-ODT) disintegrating tablet 4 mg, 4 mg, Oral, Q8H PRN **OR** ondansetron (ZOFRAN) injection 4 mg, 4 mg, IntraVENous, Q6H PRN, Berto Yen MD    0.9 % sodium chloride infusion, , IntraVENous, Continuous, Berto Yen MD, Last Rate: 125 mL/hr at 04/23/24 0315, New Bag at 04/23/24 0315    HYDROmorphone HCl PF (DILAUDID) injection 1 mg, 1 mg, IntraVENous, Q3H PRN **OR** HYDROmorphone HCl PF (DILAUDID) injection 0.5 mg, 0.5 mg, IntraVENous, Q3H PRN, Berto Yen MD    ciprofloxacin (CIPRO) IVPB 400 mg, 400 mg, IntraVENous, Q12H, Berto Yen MD    metroNIDAZOLE (FLAGYL) 500 mg in 0.9% NaCl 100 mL IVPB premix, 500 mg, IntraVENous, Q8H, Berto Yen MD, Stopped at 04/23/24 0419    escitalopram (LEXAPRO) tablet 20 mg, 20 mg, Oral, Daily, Christina, 
free air is identified.              Recent Results (from the past 24 hour(s))   CBC    Collection Time: 04/28/24  6:32 AM   Result Value Ref Range    WBC 5.6 3.6 - 11.0 K/uL    RBC 2.78 (L) 3.80 - 5.20 M/uL    Hemoglobin 8.3 (L) 11.5 - 16.0 g/dL    Hematocrit 25.7 (L) 35.0 - 47.0 %    MCV 92.4 80.0 - 99.0 FL    MCH 29.9 26.0 - 34.0 PG    MCHC 32.3 30.0 - 36.5 g/dL    RDW 13.1 11.5 - 14.5 %    Platelets 189 150 - 400 K/uL    MPV 10.0 8.9 - 12.9 FL    Nucleated RBCs 0.0 0.0  WBC    nRBC 0.00 0.00 - 0.01 K/uL   Magnesium    Collection Time: 04/28/24  6:33 AM   Result Value Ref Range    Magnesium 1.5 (L) 1.6 - 2.4 mg/dL   Comprehensive Metabolic Panel    Collection Time: 04/28/24  6:33 AM   Result Value Ref Range    Sodium 140 136 - 145 mmol/L    Potassium 3.9 3.5 - 5.1 mmol/L    Chloride 108 97 - 108 mmol/L    CO2 24 21 - 32 mmol/L    Anion Gap 8 5 - 15 mmol/L    Glucose 92 65 - 100 mg/dL    BUN 12 6 - 20 mg/dL    Creatinine 0.74 0.55 - 1.02 mg/dL    Bun/Cre Ratio 16 12 - 20      Est, Glom Filt Rate 85 >60 ml/min/1.73m2    Calcium 8.9 8.5 - 10.1 mg/dL    Total Bilirubin 0.5 0.2 - 1.0 mg/dL    AST 21 15 - 37 U/L    ALT 16 12 - 78 U/L    Alk Phosphatase 34 (L) 45 - 117 U/L    Total Protein 5.4 (L) 6.4 - 8.2 g/dL    Albumin 2.6 (L) 3.5 - 5.0 g/dL    Globulin 2.8 2.0 - 4.0 g/dL    Albumin/Globulin Ratio 0.9 (L) 1.1 - 2.2     Phosphorus    Collection Time: 04/28/24  6:33 AM   Result Value Ref Range    Phosphorus 2.0 (L) 2.6 - 4.7 mg/dL       Results       Procedure Component Value Units Date/Time    Culture, Blood 2 [[id number]] Collected: 04/23/24 0803    Order Status: Completed Specimen: Blood Updated: 04/28/24 0829     Special Requests No Special Requests        Culture No growth 5 days       Culture, Blood 1 [[id number]] Collected: 04/23/24 0803    Order Status: Completed Specimen: Blood Updated: 04/28/24 0829     Special Requests --        No Special Requests  Left  Right  Antecubital  Hand       Culture No 
perforation  Underwent colonoscopy as an outpatient  Came with abdominal pain and turned out to have bowel perforation  Status post laparoscopic sigmoid colectomy and colostomy placement  On Flagyl and ciprofloxacin                Plan:       Signed By: Mame Garcia MD     April 26, 2024       
perforation  Underwent colonoscopy as an outpatient  Came with abdominal pain and turned out to have bowel perforation  Status post laparoscopic sigmoid colectomy and colostomy placement  On Flagyl and ciprofloxacin        Anemia  Acute blood loss  Hb 8.5  Iron studies  No role of BRAYDEN         Plan:       Signed By: Mame Garcia MD     April 28, 2024       
72 hours.    Invalid input(s): \"TOTALCK\", \"TROPININ\"  No results found for: \"CHOL\", \"TRIG\", \"HDL\", \"LDLCHOLESTEROL\", \"LDLCALC\", \"VLDL\", \"CHOLHDLRATIO\"  No results found for: \"POCGLU\"  No results found for: \"COLORU\", \"CLARITYU\", \"GLUCOSEU\", \"BILIRUBINUR\", \"KETUA\", \"SPECGRAV\", \"BLOODU\", \"PHUR\", \"PROTEINU\", \"NITRU\", \"LEUKOCYTESUR\"      Assessment:   Status post laparoscopic sigmoid colectomy with colostomy  Status post bowel perforation /after colonoscopy  History of hypertension  Chronic kidney disease  Arthritis  Depression  Chronic back pain from back surgery years ago        Plan:     Patient n.p.o. with NG tube in place  IV fluids  Patient was started on Flagyl and Cipro by the surgery, continue  Repeat the labs in the morning  Replace potassium     Discussed with patient daughter  Lexapro 20 mg daily  Continue IV fluids    Follow-up with the surgeon                     
the last 72 hours.  No results for input(s): \"CKMB\", \"CKMBINDEX\", \"TROPHS\" in the last 72 hours.    Invalid input(s): \"TOTALCK\", \"TROPININ\"  No results found for: \"CHOL\", \"TRIG\", \"HDL\", \"LDLCHOLESTEROL\", \"LDLCALC\", \"VLDL\", \"CHOLHDLRATIO\"  No results found for: \"POCGLU\"  No results found for: \"COLORU\", \"CLARITYU\", \"GLUCOSEU\", \"BILIRUBINUR\", \"KETUA\", \"SPECGRAV\", \"BLOODU\", \"PHUR\", \"PROTEINU\", \"NITRU\", \"LEUKOCYTESUR\"      Assessment:   Status post laparoscopic sigmoid colectomy with colostomy  Status post bowel perforation /after colonoscopy  History of hypertension  Chronic kidney disease  Arthritis  Depression  Chronic back pain from back surgery years ago        Plan:     Patient n.p.o. with NG tube with suction  IV fluids  Patient was started on Flagyl and Cipro by the surgery, continue  Repeat the labs in the morning  Replace potassium     Discussed with patient daughter  Lexapro 20 mg daily  Continue IV fluids    Follow-up with the surgeon                     
\"IRON\", \"TIBC\", \"FERRITIN\" in the last 72 hours.    Invalid input(s): \"PSAT\"   No results found for: \"UJLIWIKV93\", \"FOLATE\", \"RBCF\"   No results for input(s): \"PH\", \"PCO2\", \"PO2\" in the last 72 hours.  No results for input(s): \"CKMB\", \"CKMBINDEX\", \"TROPHS\" in the last 72 hours.    Invalid input(s): \"TOTALCK\", \"TROPININ\"  No results found for: \"CHOL\", \"TRIG\", \"HDL\", \"LDLCHOLESTEROL\", \"LDLCALC\", \"VLDL\", \"CHOLHDLRATIO\"  No results found for: \"POCGLU\"  No results found for: \"COLORU\", \"CLARITYU\", \"GLUCOSEU\", \"BILIRUBINUR\", \"KETUA\", \"SPECGRAV\", \"BLOODU\", \"PHUR\", \"PROTEINU\", \"NITRU\", \"LEUKOCYTESUR\"      Assessment:   Status post laparoscopic sigmoid colectomy with colostomy  Status post bowel perforation /after colonoscopy  History of hypertension  Chronic kidney disease  Arthritis  Depression  Chronic back pain from back surgery years ago        Plan:     Patient n.p.o. with NG tube with suction  IV fluids  Patient was started on Flagyl and Cipro by the surgery, continue  Hypophosphatemia, 2.5 today  Repeat the labs in the morning    Discussed with patient daughter  Lexapro 20 mg daily  Continue IV fluids    Follow-up with the surgeon                     
results for input(s): \"INR\", \"PROTIME\", \"APTT\" in the last 72 hours.   No results for input(s): \"IRON\", \"TIBC\", \"FERRITIN\" in the last 72 hours.    Invalid input(s): \"PSAT\"   No results found for: \"EGNOLXMB34\", \"FOLATE\", \"RBCF\"   No results for input(s): \"PH\", \"PCO2\", \"PO2\" in the last 72 hours.  No results for input(s): \"CKMB\", \"CKMBINDEX\", \"TROPHS\" in the last 72 hours.    Invalid input(s): \"TOTALCK\", \"TROPININ\"  No results found for: \"CHOL\", \"TRIG\", \"HDL\", \"LDLCHOLESTEROL\", \"LDLCALC\", \"VLDL\", \"CHOLHDLRATIO\"  No results found for: \"POCGLU\"  No results found for: \"COLORU\", \"CLARITYU\", \"GLUCOSEU\", \"BILIRUBINUR\", \"KETUA\", \"SPECGRAV\", \"BLOODU\", \"PHUR\", \"PROTEINU\", \"NITRU\", \"LEUKOCYTESUR\"      Assessment:   Status post laparoscopic sigmoid colectomy with colostomy  Status post bowel perforation /after colonoscopy  History of hypertension  Chronic kidney disease  Arthritis  Depression  Chronic back pain from back surgery years ago  Hypophosphatemia      Plan:     Patient n.p.o. with NG tube with suction  IV fluids  Patient was started on Flagyl and Cipro by the surgery, continue  Hypophosphatemia, 1.7 today  Repeat the labs in the morning    Discussed with patient daughter  Lexapro 20 mg daily  Continue IV fluids    Follow-up with the surgeon

## 2024-05-01 NOTE — DISCHARGE INSTRUCTIONS
Discharge Instructions       PATIENT ID: Britta Quevedo  MRN: [id number]   YOB: 1950    DATE OF ADMISSION: 4/22/2024  DATE OF DISCHARGE: 5/1/2024    PRIMARY CARE PROVIDER: [unfilled]     ATTENDING PHYSICIAN: Tiffany Vasquez MD   DISCHARGING PROVIDER: Tiffany Vasquez MD    To contact this individual call [phone] and ask the  to page.   If unavailable, ask to be transferred the Adult Hospitalist Department.    DISCHARGE DIAGNOSES perforated viscus status post surgery     CONSULTATIONS: [unfilled]      PROCEDURES/SURGERIES: Procedure(s):  COLONOSCOPY    PENDING TEST RESULTS:   At the time of discharge the following test results are still pending: None    FOLLOW UP APPOINTMENTS:   Julian Pratt MD  [address] [address]  [phone]    Follow up  As needed    Demetrius Yoo APRN - NP  [address] [address]  [phone]    Schedule an appointment as soon as possible for a visit in 1 week(s)      Berto Yen MD  [address] [address]  [phone]    Schedule an appointment as soon as possible for a visit in 1 week(s)         ADDITIONAL CARE RECOMMENDATIONS: Follow-up with the surgery    DIET: Soft diet      ACTIVITY: Activity as tolerated    Wound care: {Discharge Wound Care Instructions:63647}    EQUIPMENT needed: ***      DISCHARGE MEDICATIONS:   See Medication Reconciliation Form    It is important that you take the medication exactly as they are prescribed.   Keep your medication in the bottles provided by the pharmacist and keep a list of the medication names, dosages, and times to be taken in your wallet.   Do not take other medications without consulting your doctor.       NOTIFY YOUR PHYSICIAN FOR ANY OF THE FOLLOWING:   Fever over 101 degrees for 24 hours.   Chest pain, shortness of breath, fever, chills, nausea, vomiting, diarrhea, change in mentation, falling, weakness, bleeding. Severe pain

## 2024-05-01 NOTE — DISCHARGE SUMMARY
Discharge Summary       PATIENT ID: Britta Quevedo  MRN: [id number]   YOB: 1950    DATE OF ADMISSION: 4/22/2024   DATE OF DISCHARGE:   PRIMARY CARE PROVIDER: [unfilled]      ATTENDING PHYSICIAN: Tiffany Vasquez  DISCHARGING PROVIDER: Tiffany Vasquez      CONSULTATIONS: IP CONSULT TO GENERAL SURGERY  IP CONSULT TO NEPHROLOGY  IP WOUND CARE NURSE CONSULT TO EVAL    PROCEDURES/SURGERIES: Procedure(s):  COLONOSCOPY    ADMITTING DIAGNOSES:    Patient Active Problem List    Diagnosis Date Noted    Bowel perforation (HCC) 04/22/2024    Perforation bowel (HCC) 04/22/2024       DISCHARGE DIAGNOSES / PLAN:      Status post laparoscopic sigmoid colectomy with colostomy  Status post bowel perforation /after colonoscopy  History of hypertension  Chronic kidney disease  Arthritis  Depression  Chronic back pain from back surgery years ago  Hypophosphatemia     ADDITIONAL CARE RECOMMENDATIONS:         DISCHARGE MEDICATIONS:     Medication List        ASK your doctor about these medications      aspirin 81 MG EC tablet     escitalopram 20 MG tablet  Commonly known as: LEXAPRO     lisinopril 10 MG tablet  Commonly known as: PRINIVIL;ZESTRIL     Xeljanz XR 11 MG Tb24  Generic drug: Tofacitinib Citrate ER                  NOTIFY YOUR PHYSICIAN FOR ANY OF THE FOLLOWING:   Fever over 101 degrees for 24 hours.   Chest pain, shortness of breath, fever, chills, nausea, vomiting, diarrhea, change in mentation, falling, weakness, bleeding. Severe pain or pain not relieved by medications.  Or, any other signs or symptoms that you may have questions about.    DISPOSITION:  x  Home With:   OT  PT  HH  RN       Long term SNF/Inpatient Rehab    Independent/assisted living    Hospice    Other:       PATIENT CONDITION AT DISCHARGE: Stable      PHYSICAL EXAMINATION AT DISCHARGE:  General:          Alert, cooperative, no distress, appears stated age.     HEENT:           Atraumatic, anicteric sclerae, pink conjunctivae                 
discharge    Medication reconciliation done time chart patient 35 minutes 50% time spent counseling and coordination of care    Signed:   Tiffany Vasquez MD  5/1/2024  1:32 PM

## 2024-05-01 NOTE — WOUND CARE
Met with patient to assess for pouching needs.  The 1-piece convex appliance placed yesterday is intact and without leaks.  Stoma belt is in place.  Patient reports that she is comfortable with the belt.  Patient denies having questions at this time related to ostomy care. Small smear of loose green black stool noted in pouch.  Stool appears thicker in consistency than yesterday.  Patient reports she has practiced with opening and securing pouch closure.  Advised patient that she is to empty her pouch with RN supervision starting today.  Informed Kari Hdz.  Patient with discharge order.  Provided patient with 5 x 1-piece convex ostomy pouches, 9 x elastic barrier strips, 1 x ostomy belt, box of 4x4s, 1 x can of saline, 1 x box of stoma powder, and 1 x tube of stoma paste.  [facility] will continue to follow for ostomy education and pouching needs if not discharged this evening.

## 2024-05-06 ENCOUNTER — OFFICE VISIT (OUTPATIENT)
Age: 74
End: 2024-05-06

## 2024-05-06 VITALS
HEIGHT: 59 IN | RESPIRATION RATE: 18 BRPM | BODY MASS INDEX: 34.17 KG/M2 | DIASTOLIC BLOOD PRESSURE: 73 MMHG | TEMPERATURE: 98 F | HEART RATE: 102 BPM | SYSTOLIC BLOOD PRESSURE: 127 MMHG | OXYGEN SATURATION: 95 % | WEIGHT: 169.5 LBS

## 2024-05-06 DIAGNOSIS — K63.1 BOWEL PERFORATION (HCC): Primary | ICD-10-CM

## 2024-05-06 PROCEDURE — 99024 POSTOP FOLLOW-UP VISIT: CPT | Performed by: SURGERY

## 2024-05-06 ASSESSMENT — PATIENT HEALTH QUESTIONNAIRE - PHQ9
SUM OF ALL RESPONSES TO PHQ QUESTIONS 1-9: 1
2. FEELING DOWN, DEPRESSED OR HOPELESS: SEVERAL DAYS
SUM OF ALL RESPONSES TO PHQ9 QUESTIONS 1 & 2: 1
SUM OF ALL RESPONSES TO PHQ QUESTIONS 1-9: 1
1. LITTLE INTEREST OR PLEASURE IN DOING THINGS: NOT AT ALL
SUM OF ALL RESPONSES TO PHQ QUESTIONS 1-9: 1
SUM OF ALL RESPONSES TO PHQ QUESTIONS 1-9: 1

## 2024-05-06 NOTE — PROGRESS NOTES
Identified pt with two pt identifiers (name and ). Reviewed chart in preparation for visit and have obtained necessary documentation.    Britta Quevedo is a 74 y.o. female Post-Op Check (1 week f/u bowel prep )  .    Vitals:    24 1134   BP: 127/73   Site: Right Upper Arm   Position: Sitting   Cuff Size: Medium Adult   Pulse: (!) 102   Resp: 18   Temp: 98 °F (36.7 °C)   TempSrc: Oral   SpO2: 95%   Weight: 76.9 kg (169 lb 8 oz)   Height: 1.499 m (4' 11\")          1. Have you been to the ER, urgent care clinic since your last visit?  Hospitalized since your last visit?  no     2. Have you seen or consulted any other health care providers outside of the [facility] since your last visit?  Include any pap smears or colon screening.  no

## 2024-05-08 ENCOUNTER — TELEPHONE (OUTPATIENT)
Age: 74
End: 2024-05-08

## 2024-05-08 NOTE — TELEPHONE ENCOUNTER
[facility] home health nurse, post op issue, and nurse is skin prepped & wound care.       [phone]

## 2024-05-08 NOTE — TELEPHONE ENCOUNTER
Called nurse back and she informed me that the patient has some redness around the stoma and the hole looked a little too big so they a prep and duoderm strips and but a new bag on. They also ordered the patient some supplies since the wrong things were sent from the hospital and they were just letting us know.

## 2024-05-09 NOTE — PROGRESS NOTES
Patient is here today follow-up postop.  Patient had a laparotomy and colostomy placement for perforated bowel.  Patient says that she is weak.  She has poor appetite.  No fever however.  Midline incision looks clean.  Staples removed.    Family is having difficult time putting colostomy.  I told patient and family to have a home health team contact me about colostomy care and teaching.  Patient will be reassessed in 2 weeks.

## 2024-05-13 ENCOUNTER — TELEPHONE (OUTPATIENT)
Age: 74
End: 2024-05-13

## 2024-05-13 NOTE — TELEPHONE ENCOUNTER
Tried to call Britta Quevedo it went straight to voicemail and no name on voicemail. Unable to leave a message.

## 2024-05-13 NOTE — TELEPHONE ENCOUNTER
Patient called to let us know nobody has returned her call about the issue with her bags. Told her we are still In clinic and someone will call her shortly.

## 2024-05-13 NOTE — TELEPHONE ENCOUNTER
Nohemi the daughter called regarding bag. She has not had anything come out. Please call her at [phone]

## 2024-05-13 NOTE — TELEPHONE ENCOUNTER
Patient called in stating her bag is leaking and nothing is going in it. Please call patient ASAP to assist. Thank you DCR

## 2024-05-14 NOTE — TELEPHONE ENCOUNTER
I called and spoke with Salena she states that a nurse from our office called yesterday and told her to take some Fiber and a stool softener. She had a bowel movement this morning so she is doing well right now. They will give us a call if they have any other issues.

## 2024-05-16 ENCOUNTER — APPOINTMENT (OUTPATIENT)
Facility: HOSPITAL | Age: 74
DRG: 871 | End: 2024-05-16
Payer: MEDICARE

## 2024-05-16 ENCOUNTER — HOSPITAL ENCOUNTER (INPATIENT)
Facility: HOSPITAL | Age: 74
LOS: 3 days | Discharge: HOME HEALTH | DRG: 871 | End: 2024-05-20
Attending: EMERGENCY MEDICINE | Admitting: INTERNAL MEDICINE
Payer: MEDICARE

## 2024-05-16 DIAGNOSIS — A41.9 SEPTIC SHOCK (HCC): Primary | ICD-10-CM

## 2024-05-16 DIAGNOSIS — R65.21 SEPTIC SHOCK (HCC): Primary | ICD-10-CM

## 2024-05-16 DIAGNOSIS — R55 SYNCOPE, UNSPECIFIED SYNCOPE TYPE: ICD-10-CM

## 2024-05-16 LAB
ALBUMIN SERPL-MCNC: 3.4 G/DL (ref 3.5–5)
ALBUMIN/GLOB SERPL: 1.2 (ref 1.1–2.2)
ALP SERPL-CCNC: 38 U/L (ref 45–117)
ALT SERPL-CCNC: 20 U/L (ref 12–78)
ANION GAP SERPL CALC-SCNC: 11 MMOL/L (ref 5–15)
AST SERPL W P-5'-P-CCNC: 22 U/L (ref 15–37)
BASE EXCESS BLD CALC-SCNC: 1.4 MMOL/L
BASOPHILS # BLD: 0.1 K/UL (ref 0–0.1)
BASOPHILS NFR BLD: 0 % (ref 0–1)
BILIRUB SERPL-MCNC: 0.5 MG/DL (ref 0.2–1)
BUN SERPL-MCNC: 26 MG/DL (ref 6–20)
BUN/CREAT SERPL: 15 (ref 12–20)
CA-I BLD-MCNC: 1.22 MMOL/L (ref 1.12–1.32)
CA-I BLD-MCNC: 10.1 MG/DL (ref 8.5–10.1)
CHLORIDE BLD-SCNC: 103 MMOL/L (ref 98–107)
CHLORIDE SERPL-SCNC: 105 MMOL/L (ref 97–108)
CO2 BLD-SCNC: 27 MMOL/L
CO2 SERPL-SCNC: 22 MMOL/L (ref 21–32)
CREAT SERPL-MCNC: 1.76 MG/DL (ref 0.55–1.02)
CREAT UR-MCNC: 1.5 MG/DL (ref 0.6–1.3)
DIFFERENTIAL METHOD BLD: ABNORMAL
EOSINOPHIL # BLD: 0.1 K/UL (ref 0–0.4)
EOSINOPHIL NFR BLD: 0 % (ref 0–7)
ERYTHROCYTE [DISTWIDTH] IN BLOOD BY AUTOMATED COUNT: 14 % (ref 11.5–14.5)
GLOBULIN SER CALC-MCNC: 2.9 G/DL (ref 2–4)
GLUCOSE BLD STRIP.AUTO-MCNC: 218 MG/DL (ref 65–100)
GLUCOSE SERPL-MCNC: 216 MG/DL (ref 65–100)
HCO3 BLD-SCNC: 27.5 MMOL/L (ref 19–28)
HCT VFR BLD AUTO: 39.4 % (ref 35–47)
HGB BLD-MCNC: 12.6 G/DL (ref 11.5–16)
IMM GRANULOCYTES # BLD AUTO: 0.1 K/UL (ref 0–0.04)
IMM GRANULOCYTES NFR BLD AUTO: 1 % (ref 0–0.5)
LACTATE BLD-SCNC: 1.41 MMOL/L (ref 0.4–2)
LACTATE BLD-SCNC: 4.91 MMOL/L (ref 0.4–2)
LYMPHOCYTES # BLD: 1.3 K/UL (ref 0.8–3.5)
LYMPHOCYTES NFR BLD: 8 % (ref 12–49)
MCH RBC QN AUTO: 29.6 PG (ref 26–34)
MCHC RBC AUTO-ENTMCNC: 32 G/DL (ref 30–36.5)
MCV RBC AUTO: 92.5 FL (ref 80–99)
MONOCYTES # BLD: 0.8 K/UL (ref 0–1)
MONOCYTES NFR BLD: 5 % (ref 5–13)
NEUTS SEG # BLD: 14.5 K/UL (ref 1.8–8)
NEUTS SEG NFR BLD: 86 % (ref 32–75)
NRBC # BLD: 0 K/UL (ref 0–0.01)
NRBC BLD-RTO: 0 PER 100 WBC
PCO2 BLD: 48.2 MMHG (ref 35–45)
PERFORMED BY:: ABNORMAL
PERFORMED BY:: NORMAL
PH BLD: 7.36 (ref 7.35–7.45)
PLATELET # BLD AUTO: 317 K/UL (ref 150–400)
PMV BLD AUTO: 10.7 FL (ref 8.9–12.9)
PO2 BLD: <27 MMHG (ref 75–100)
POTASSIUM BLD-SCNC: 2.7 MMOL/L (ref 3.5–5.5)
POTASSIUM SERPL-SCNC: 3 MMOL/L (ref 3.5–5.1)
PROT SERPL-MCNC: 6.3 G/DL (ref 6.4–8.2)
RBC # BLD AUTO: 4.26 M/UL (ref 3.8–5.2)
SERVICE CMNT-IMP: ABNORMAL
SODIUM BLD-SCNC: 143 MMOL/L (ref 136–145)
SODIUM SERPL-SCNC: 138 MMOL/L (ref 136–145)
SPECIMEN SITE: ABNORMAL
TROPONIN I SERPL HS-MCNC: 5 NG/L (ref 0–51)
WBC # BLD AUTO: 16.9 K/UL (ref 3.6–11)

## 2024-05-16 PROCEDURE — 99285 EMERGENCY DEPT VISIT HI MDM: CPT

## 2024-05-16 PROCEDURE — 36415 COLL VENOUS BLD VENIPUNCTURE: CPT

## 2024-05-16 PROCEDURE — 83605 ASSAY OF LACTIC ACID: CPT

## 2024-05-16 PROCEDURE — 96374 THER/PROPH/DIAG INJ IV PUSH: CPT

## 2024-05-16 PROCEDURE — 84484 ASSAY OF TROPONIN QUANT: CPT

## 2024-05-16 PROCEDURE — 80053 COMPREHEN METABOLIC PANEL: CPT

## 2024-05-16 PROCEDURE — 84132 ASSAY OF SERUM POTASSIUM: CPT

## 2024-05-16 PROCEDURE — 84295 ASSAY OF SERUM SODIUM: CPT

## 2024-05-16 PROCEDURE — 6360000002 HC RX W HCPCS: Performed by: EMERGENCY MEDICINE

## 2024-05-16 PROCEDURE — 82947 ASSAY GLUCOSE BLOOD QUANT: CPT

## 2024-05-16 PROCEDURE — 83036 HEMOGLOBIN GLYCOSYLATED A1C: CPT

## 2024-05-16 PROCEDURE — 93005 ELECTROCARDIOGRAM TRACING: CPT | Performed by: STUDENT IN AN ORGANIZED HEALTH CARE EDUCATION/TRAINING PROGRAM

## 2024-05-16 PROCEDURE — 2580000003 HC RX 258: Performed by: EMERGENCY MEDICINE

## 2024-05-16 PROCEDURE — 84145 PROCALCITONIN (PCT): CPT

## 2024-05-16 PROCEDURE — 82803 BLOOD GASES ANY COMBINATION: CPT

## 2024-05-16 PROCEDURE — 71045 X-RAY EXAM CHEST 1 VIEW: CPT

## 2024-05-16 PROCEDURE — 82330 ASSAY OF CALCIUM: CPT

## 2024-05-16 PROCEDURE — 87040 BLOOD CULTURE FOR BACTERIA: CPT

## 2024-05-16 PROCEDURE — 85025 COMPLETE CBC W/AUTO DIFF WBC: CPT

## 2024-05-16 RX ORDER — 0.9 % SODIUM CHLORIDE 0.9 %
30 INTRAVENOUS SOLUTION INTRAVENOUS ONCE
Status: COMPLETED | OUTPATIENT
Start: 2024-05-16 | End: 2024-05-16

## 2024-05-16 RX ADMIN — CEFEPIME 2000 MG: 2 INJECTION, POWDER, FOR SOLUTION INTRAVENOUS at 22:30

## 2024-05-16 RX ADMIN — SODIUM CHLORIDE 2259 ML: 9 INJECTION, SOLUTION INTRAVENOUS at 22:23

## 2024-05-16 ASSESSMENT — PAIN - FUNCTIONAL ASSESSMENT: PAIN_FUNCTIONAL_ASSESSMENT: NONE - DENIES PAIN

## 2024-05-17 ENCOUNTER — APPOINTMENT (OUTPATIENT)
Facility: HOSPITAL | Age: 74
DRG: 871 | End: 2024-05-17
Attending: PHYSICIAN ASSISTANT
Payer: MEDICARE

## 2024-05-17 ENCOUNTER — APPOINTMENT (OUTPATIENT)
Facility: HOSPITAL | Age: 74
DRG: 871 | End: 2024-05-17
Payer: MEDICARE

## 2024-05-17 PROBLEM — A41.9 SEPSIS (HCC): Status: ACTIVE | Noted: 2024-05-17

## 2024-05-17 LAB
ANION GAP SERPL CALC-SCNC: 6 MMOL/L (ref 5–15)
APPEARANCE UR: ABNORMAL
BACTERIA URNS QL MICRO: ABNORMAL /HPF
BASOPHILS # BLD: 0 K/UL (ref 0–0.1)
BASOPHILS NFR BLD: 0 % (ref 0–1)
BILIRUB UR QL: NEGATIVE
BUN SERPL-MCNC: 25 MG/DL (ref 6–20)
BUN/CREAT SERPL: 14 (ref 12–20)
CA-I BLD-MCNC: 9 MG/DL (ref 8.5–10.1)
CHLORIDE SERPL-SCNC: 113 MMOL/L (ref 97–108)
CO2 SERPL-SCNC: 23 MMOL/L (ref 21–32)
COLOR UR: ABNORMAL
CREAT SERPL-MCNC: 1.74 MG/DL (ref 0.55–1.02)
DATE LAST DOSE: NORMAL
DIFFERENTIAL METHOD BLD: ABNORMAL
DOSE AMOUNT: NORMAL UNITS
EKG ATRIAL RATE: 95 BPM
EKG DIAGNOSIS: NORMAL
EKG P AXIS: 38 DEGREES
EKG P-R INTERVAL: 160 MS
EKG Q-T INTERVAL: 420 MS
EKG QRS DURATION: 72 MS
EKG QTC CALCULATION (BAZETT): 527 MS
EKG R AXIS: -2 DEGREES
EKG T AXIS: 34 DEGREES
EKG VENTRICULAR RATE: 95 BPM
EOSINOPHIL # BLD: 0 K/UL (ref 0–0.4)
EOSINOPHIL NFR BLD: 0 % (ref 0–7)
EPITH CASTS URNS QL MICRO: ABNORMAL /LPF
ERYTHROCYTE [DISTWIDTH] IN BLOOD BY AUTOMATED COUNT: 14.2 % (ref 11.5–14.5)
EST. AVERAGE GLUCOSE BLD GHB EST-MCNC: 114 MG/DL
GLUCOSE SERPL-MCNC: 144 MG/DL (ref 65–100)
GLUCOSE UR STRIP.AUTO-MCNC: NEGATIVE MG/DL
HBA1C MFR BLD: 5.6 % (ref 4–5.6)
HCT VFR BLD AUTO: 31.8 % (ref 35–47)
HGB BLD-MCNC: 10.2 G/DL (ref 11.5–16)
HGB UR QL STRIP: NEGATIVE
HYALINE CASTS URNS QL MICRO: ABNORMAL /LPF (ref 0–5)
IMM GRANULOCYTES # BLD AUTO: 0 K/UL (ref 0–0.04)
IMM GRANULOCYTES NFR BLD AUTO: 0 % (ref 0–0.5)
KETONES UR QL STRIP.AUTO: NEGATIVE MG/DL
LEUKOCYTE ESTERASE UR QL STRIP.AUTO: ABNORMAL
LYMPHOCYTES # BLD: 0.5 K/UL (ref 0.8–3.5)
LYMPHOCYTES NFR BLD: 7 % (ref 12–49)
MAGNESIUM SERPL-MCNC: 1.8 MG/DL (ref 1.6–2.4)
MCH RBC QN AUTO: 29.6 PG (ref 26–34)
MCHC RBC AUTO-ENTMCNC: 32.1 G/DL (ref 30–36.5)
MCV RBC AUTO: 92.2 FL (ref 80–99)
MONOCYTES # BLD: 0.8 K/UL (ref 0–1)
MONOCYTES NFR BLD: 12 % (ref 5–13)
MUCOUS THREADS URNS QL MICRO: ABNORMAL /LPF
NEUTS SEG # BLD: 5.4 K/UL (ref 1.8–8)
NEUTS SEG NFR BLD: 81 % (ref 32–75)
NITRITE UR QL STRIP.AUTO: NEGATIVE
NRBC # BLD: 0 K/UL (ref 0–0.01)
NRBC BLD-RTO: 0 PER 100 WBC
PH UR STRIP: 5 (ref 5–8)
PLATELET # BLD AUTO: 207 K/UL (ref 150–400)
PMV BLD AUTO: 10.6 FL (ref 8.9–12.9)
POTASSIUM SERPL-SCNC: 3.2 MMOL/L (ref 3.5–5.1)
PROCALCITONIN SERPL-MCNC: 0.1 NG/ML
PROT UR STRIP-MCNC: 30 MG/DL
RBC # BLD AUTO: 3.45 M/UL (ref 3.8–5.2)
RBC #/AREA URNS HPF: ABNORMAL /HPF (ref 0–5)
SODIUM SERPL-SCNC: 142 MMOL/L (ref 136–145)
SP GR UR REFRACTOMETRY: 1.01 (ref 1–1.03)
T4 FREE SERPL-MCNC: 1.1 NG/DL (ref 0.8–1.5)
TROPONIN I SERPL HS-MCNC: 6 NG/L (ref 0–51)
TSH SERPL DL<=0.05 MIU/L-ACNC: 1.23 UIU/ML (ref 0.36–3.74)
URINE CULTURE IF INDICATED: ABNORMAL
UROBILINOGEN UR QL STRIP.AUTO: 0.1 EU/DL (ref 0.1–1)
VANCOMYCIN SERPL-MCNC: 21.9 UG/ML
WBC # BLD AUTO: 6.7 K/UL (ref 3.6–11)
WBC URNS QL MICRO: ABNORMAL /HPF (ref 0–4)

## 2024-05-17 PROCEDURE — 71250 CT THORAX DX C-: CPT

## 2024-05-17 PROCEDURE — 84439 ASSAY OF FREE THYROXINE: CPT

## 2024-05-17 PROCEDURE — 6370000000 HC RX 637 (ALT 250 FOR IP): Performed by: INTERNAL MEDICINE

## 2024-05-17 PROCEDURE — 84443 ASSAY THYROID STIM HORMONE: CPT

## 2024-05-17 PROCEDURE — 1100000000 HC RM PRIVATE

## 2024-05-17 PROCEDURE — 80048 BASIC METABOLIC PNL TOTAL CA: CPT

## 2024-05-17 PROCEDURE — 6360000002 HC RX W HCPCS: Performed by: INTERNAL MEDICINE

## 2024-05-17 PROCEDURE — 80202 ASSAY OF VANCOMYCIN: CPT

## 2024-05-17 PROCEDURE — 36415 COLL VENOUS BLD VENIPUNCTURE: CPT

## 2024-05-17 PROCEDURE — 83735 ASSAY OF MAGNESIUM: CPT

## 2024-05-17 PROCEDURE — 2580000003 HC RX 258: Performed by: INTERNAL MEDICINE

## 2024-05-17 PROCEDURE — 2580000003 HC RX 258: Performed by: PHYSICIAN ASSISTANT

## 2024-05-17 PROCEDURE — 81001 URINALYSIS AUTO W/SCOPE: CPT

## 2024-05-17 PROCEDURE — 87086 URINE CULTURE/COLONY COUNT: CPT

## 2024-05-17 PROCEDURE — 93306 TTE W/DOPPLER COMPLETE: CPT

## 2024-05-17 PROCEDURE — 85025 COMPLETE CBC W/AUTO DIFF WBC: CPT

## 2024-05-17 RX ORDER — SODIUM CHLORIDE 0.9 % (FLUSH) 0.9 %
5-40 SYRINGE (ML) INJECTION EVERY 12 HOURS SCHEDULED
Status: DISCONTINUED | OUTPATIENT
Start: 2024-05-17 | End: 2024-05-20 | Stop reason: HOSPADM

## 2024-05-17 RX ORDER — SODIUM CHLORIDE, SODIUM LACTATE, POTASSIUM CHLORIDE, CALCIUM CHLORIDE 600; 310; 30; 20 MG/100ML; MG/100ML; MG/100ML; MG/100ML
INJECTION, SOLUTION INTRAVENOUS CONTINUOUS
Status: DISPENSED | OUTPATIENT
Start: 2024-05-17 | End: 2024-05-18

## 2024-05-17 RX ORDER — 0.9 % SODIUM CHLORIDE 0.9 %
1000 INTRAVENOUS SOLUTION INTRAVENOUS ONCE
Status: COMPLETED | OUTPATIENT
Start: 2024-05-17 | End: 2024-05-17

## 2024-05-17 RX ORDER — ACETAMINOPHEN 325 MG/1
650 TABLET ORAL EVERY 6 HOURS PRN
Status: DISCONTINUED | OUTPATIENT
Start: 2024-05-17 | End: 2024-05-20 | Stop reason: HOSPADM

## 2024-05-17 RX ORDER — POTASSIUM CHLORIDE 7.45 MG/ML
10 INJECTION INTRAVENOUS ONCE
Status: COMPLETED | OUTPATIENT
Start: 2024-05-17 | End: 2024-05-17

## 2024-05-17 RX ORDER — POTASSIUM CHLORIDE 20 MEQ/1
40 TABLET, EXTENDED RELEASE ORAL PRN
Status: DISCONTINUED | OUTPATIENT
Start: 2024-05-17 | End: 2024-05-20 | Stop reason: HOSPADM

## 2024-05-17 RX ORDER — ONDANSETRON 4 MG/1
4 TABLET, ORALLY DISINTEGRATING ORAL EVERY 8 HOURS PRN
Status: DISCONTINUED | OUTPATIENT
Start: 2024-05-17 | End: 2024-05-20 | Stop reason: HOSPADM

## 2024-05-17 RX ORDER — LISINOPRIL AND HYDROCHLOROTHIAZIDE 25; 20 MG/1; MG/1
1 TABLET ORAL DAILY
COMMUNITY
Start: 2022-07-21

## 2024-05-17 RX ORDER — ACETAMINOPHEN 650 MG/1
650 SUPPOSITORY RECTAL EVERY 6 HOURS PRN
Status: DISCONTINUED | OUTPATIENT
Start: 2024-05-17 | End: 2024-05-20 | Stop reason: HOSPADM

## 2024-05-17 RX ORDER — POTASSIUM CHLORIDE 7.45 MG/ML
10 INJECTION INTRAVENOUS PRN
Status: DISCONTINUED | OUTPATIENT
Start: 2024-05-17 | End: 2024-05-20 | Stop reason: HOSPADM

## 2024-05-17 RX ORDER — ENOXAPARIN SODIUM 100 MG/ML
40 INJECTION SUBCUTANEOUS DAILY
Status: DISCONTINUED | OUTPATIENT
Start: 2024-05-17 | End: 2024-05-20 | Stop reason: HOSPADM

## 2024-05-17 RX ORDER — ATORVASTATIN CALCIUM 40 MG/1
40 TABLET, FILM COATED ORAL DAILY
Status: DISCONTINUED | OUTPATIENT
Start: 2024-05-17 | End: 2024-05-20 | Stop reason: HOSPADM

## 2024-05-17 RX ORDER — SODIUM CHLORIDE, SODIUM LACTATE, POTASSIUM CHLORIDE, CALCIUM CHLORIDE 600; 310; 30; 20 MG/100ML; MG/100ML; MG/100ML; MG/100ML
INJECTION, SOLUTION INTRAVENOUS CONTINUOUS
Status: DISPENSED | OUTPATIENT
Start: 2024-05-17 | End: 2024-05-17

## 2024-05-17 RX ORDER — ONDANSETRON 2 MG/ML
4 INJECTION INTRAMUSCULAR; INTRAVENOUS EVERY 6 HOURS PRN
Status: DISCONTINUED | OUTPATIENT
Start: 2024-05-17 | End: 2024-05-20 | Stop reason: HOSPADM

## 2024-05-17 RX ORDER — MAGNESIUM SULFATE IN WATER 40 MG/ML
2000 INJECTION, SOLUTION INTRAVENOUS PRN
Status: DISCONTINUED | OUTPATIENT
Start: 2024-05-17 | End: 2024-05-20 | Stop reason: HOSPADM

## 2024-05-17 RX ORDER — ESCITALOPRAM OXALATE 10 MG/1
20 TABLET ORAL DAILY
Status: DISCONTINUED | OUTPATIENT
Start: 2024-05-17 | End: 2024-05-20 | Stop reason: HOSPADM

## 2024-05-17 RX ORDER — ROSUVASTATIN CALCIUM 20 MG/1
20 TABLET, COATED ORAL DAILY
COMMUNITY
Start: 2020-02-07

## 2024-05-17 RX ORDER — SODIUM CHLORIDE 0.9 % (FLUSH) 0.9 %
5-40 SYRINGE (ML) INJECTION PRN
Status: DISCONTINUED | OUTPATIENT
Start: 2024-05-17 | End: 2024-05-20 | Stop reason: HOSPADM

## 2024-05-17 RX ORDER — POLYETHYLENE GLYCOL 3350 17 G/17G
17 POWDER, FOR SOLUTION ORAL DAILY PRN
Status: DISCONTINUED | OUTPATIENT
Start: 2024-05-17 | End: 2024-05-20 | Stop reason: HOSPADM

## 2024-05-17 RX ORDER — SODIUM CHLORIDE 9 MG/ML
INJECTION, SOLUTION INTRAVENOUS PRN
Status: DISCONTINUED | OUTPATIENT
Start: 2024-05-17 | End: 2024-05-20 | Stop reason: HOSPADM

## 2024-05-17 RX ADMIN — SODIUM CHLORIDE, POTASSIUM CHLORIDE, SODIUM LACTATE AND CALCIUM CHLORIDE: 600; 310; 30; 20 INJECTION, SOLUTION INTRAVENOUS at 03:24

## 2024-05-17 RX ADMIN — PIPERACILLIN AND TAZOBACTAM 3375 MG: 3; .375 INJECTION, POWDER, LYOPHILIZED, FOR SOLUTION INTRAVENOUS at 05:47

## 2024-05-17 RX ADMIN — VANCOMYCIN HYDROCHLORIDE 2000 MG: 1 INJECTION, POWDER, LYOPHILIZED, FOR SOLUTION INTRAVENOUS at 00:47

## 2024-05-17 RX ADMIN — SODIUM CHLORIDE 1000 ML: 9 INJECTION, SOLUTION INTRAVENOUS at 00:46

## 2024-05-17 RX ADMIN — SODIUM CHLORIDE, PRESERVATIVE FREE 10 ML: 5 INJECTION INTRAVENOUS at 08:47

## 2024-05-17 RX ADMIN — SODIUM CHLORIDE, POTASSIUM CHLORIDE, SODIUM LACTATE AND CALCIUM CHLORIDE: 600; 310; 30; 20 INJECTION, SOLUTION INTRAVENOUS at 23:10

## 2024-05-17 RX ADMIN — SODIUM CHLORIDE, PRESERVATIVE FREE 10 ML: 5 INJECTION INTRAVENOUS at 21:36

## 2024-05-17 RX ADMIN — ENOXAPARIN SODIUM 40 MG: 100 INJECTION SUBCUTANEOUS at 08:47

## 2024-05-17 RX ADMIN — ESCITALOPRAM OXALATE 20 MG: 10 TABLET ORAL at 08:46

## 2024-05-17 RX ADMIN — PIPERACILLIN AND TAZOBACTAM 3375 MG: 3; .375 INJECTION, POWDER, LYOPHILIZED, FOR SOLUTION INTRAVENOUS at 14:37

## 2024-05-17 RX ADMIN — SODIUM CHLORIDE, POTASSIUM CHLORIDE, SODIUM LACTATE AND CALCIUM CHLORIDE: 600; 310; 30; 20 INJECTION, SOLUTION INTRAVENOUS at 12:04

## 2024-05-17 RX ADMIN — PIPERACILLIN AND TAZOBACTAM 3375 MG: 3; .375 INJECTION, POWDER, LYOPHILIZED, FOR SOLUTION INTRAVENOUS at 21:36

## 2024-05-17 RX ADMIN — ATORVASTATIN CALCIUM 40 MG: 40 TABLET, FILM COATED ORAL at 08:47

## 2024-05-17 RX ADMIN — POTASSIUM CHLORIDE 10 MEQ: 7.46 INJECTION, SOLUTION INTRAVENOUS at 00:46

## 2024-05-17 NOTE — ED PROVIDER NOTES
[facility] EMERGENCY DEPT  EMERGENCY DEPARTMENT ENCOUNTER       Pt Name: Britta Quevedo  MRN: [id number]  Birthdate 1950  Date of evaluation: 5/16/2024  Provider: Lamont Young MD   PCP: Demetrius Yoo APRN - NP  Note Started: 11:42 PM EDT 5/16/24     CHIEF COMPLAINT       Chief Complaint   Patient presents with    Emesis    Loss of Consciousness        HISTORY OF PRESENT ILLNESS: 1 or more elements      History From: Patient and Patient's Daughter  HPI Limitations: None     Britta Quevedo is a 74 y.o. female who presents with complaints of loss of consciousness, vomiting, productive cough over the last several hours.  Patient had been feeling fine until this evening and then all of a sudden became lightheaded and dizzy and almost fainted.  Patient reports that she vomited afterwards.  She is now feeling very weak and tired.  She denies any abdominal pain, back pain, diarrhea.  She does have a little bit of a cough and some sensation of shortness of breath.       Nursing Notes were all reviewed and agreed with or any disagreements were addressed in the HPI.     REVIEW OF SYSTEMS      Review of Systems     Positives and Pertinent negatives as per HPI.    PAST HISTORY     Past Medical History:  Past Medical History:   Diagnosis Date    Arthritis     Chronic kidney disease     Depression     Endometriosis     Hyperlipemia          Past Surgical History:  Past Surgical History:   Procedure Laterality Date    BACK SURGERY      CHOLECYSTECTOMY      COLONOSCOPY      COLONOSCOPY N/A 4/22/2024    COLONOSCOPY performed by Julian Pratt MD at [facility] ENDOSCOPY    HYSTERECTOMY (CERVIX STATUS UNKNOWN)      LAPAROSCOPY N/A 4/22/2024    LAPAROSCOPY DIAGNOSTIC, PARTIAL SIGMOID COLECTOMY WITH COLOSTOMY CREATION performed by Berto Yen MD at [facility] MAIN OR    TONSILLECTOMY         Family History:  Family History   Problem Relation Age of Onset    Dementia Mother     Heart Disease Father     Heart Attack Brother     Brain

## 2024-05-17 NOTE — ED TRIAGE NOTES
EMS reports pt had 2 syncopal episodes with them and vomited one time. Pt is hypotensive and hypoxic on arrival. Pt arrives on 6L NC and 98% oxygen saturation. Pt was 78% on room air for EMS. PT is alert and oriented x4 on arrival. PT recently had surgery for colostomy placement 2 weeks ago.

## 2024-05-17 NOTE — PROGRESS NOTES
4 Eyes Skin Assessment     NAME:  Britta Quevedo  YOB: 1950  MEDICAL RECORD NUMBER:  [id number]    The patient is being assessed for  Admission    I agree that at least one RN has performed a thorough Head to Toe Skin Assessment on the patient. ALL assessment sites listed below have been assessed.      Areas assessed by both nurses:    Head, Face, Ears, Shoulders, Back, Chest, Arms, Elbows, Hands, Sacrum. Buttock, Coccyx, Ischium, Legs. Feet and Heels, and Under Medical Devices         Does the Patient have a Wound? No noted wound(s)    No open wounds. Pt has a surgical scar from on abd and a colostomy.        Celestine Prevention initiated by RN: Yes  Wound Care Orders initiated by RN: No    Pressure Injury (Stage 3,4, Unstageable, DTI, NWPT, and Complex wounds) if present, place Wound referral order by RN under : No    New Ostomies, if present place, Ostomy referral order under :     Nurse 1 eSignature: Electronically signed by Diana Grace LPN on 5/17/24 at 2:51 AM EDT    **SHARE this note so that the co-signing nurse can place an eSignature**    Nurse 2 eSignature: {Esignature:[id number]}

## 2024-05-17 NOTE — PLAN OF CARE
Problem: Safety - Adult  Goal: Free from fall injury  5/17/2024 0857 by Devorah Reeder RN  Outcome: Progressing  5/17/2024 0241 by Diana Grace LPN  Outcome: Progressing     Problem: Discharge Planning  Goal: Discharge to home or other facility with appropriate resources  Recent Flowsheet Documentation  Taken 5/17/2024 0800 by Devorah Reeder RN  Discharge to home or other facility with appropriate resources: Identify barriers to discharge with patient and caregiver  5/17/2024 0241 by Diana Grace LPN  Outcome: Progressing

## 2024-05-17 NOTE — ED NOTES
Assumed care of pt at this time. Pt is alert and oriented x4 with no signs of respiratory distress. Pt is on cardiac monitor, continuous pulse ox, and BP monitor at this time. Pt has family at bedside.

## 2024-05-17 NOTE — PROGRESS NOTES
Current ostomy appliance is well-fitting and without leaks.  Patient is wearing a ostomy belt.  Patient reports that she can empty the pouch herself but her daughter changes it for her at home.  Pouch noted to be full.  Emptied approximately 150 mL of soft trudy colored stool from pouch.  Provided a 1-piece convex ostomy pouch that can be used with stoma belt for next pouch change.  Recommend changing pouch every 3-4 days.  Change immediately if leaking.  No other pouching needs noted at this time.  Re-consult [facility] for other ostomy or skin care concerns.  Informed Devorah Hdz.

## 2024-05-17 NOTE — PROGRESS NOTES
Hospitalist Progress Note    NAME:   Britta Quevedo   : 1950   MRN: [id number]     Date/Time: 2024 11:09 AM  Patient PCP: Demetrius Yoo APRN - NP    Estimated discharge date: 48 hours  Barriers: IV antibiotics, cultures, clinical improvement, echo      Assessment / Plan:  Sepsis  - Source: most likely UTI. No other obvious source of infection  - Continue IV Vanco and Zosyn, screen for MRSA in nares  - Continue IVF  - Blood culture NGTD  - Lactic normal    Syncope  - Reports passing out twice while seated  - Continue telemetry  - Possibly related to dehydration/UTI/sepsis  - Echo, TSH     UTI  - IV zosyn empirically  - Urine culture pending     ZULMA  - secondary to sepsis  - Continue IVF  - Repeat creatinine pending, previous 1.7, baseline 0.8     Hypokalemia  - secondary to dehydration from sepsis  - replacement ordered, repeat pending     Bowel perforation s/p colostomy  - colostomy care pending     Essential hypertension   - Hold home lisinopril 20 HCTZ 25 mg given hypotension     HLP   - Continue home statin    Code Status: Full  DVT Prophylaxis: Lovenox  GI Prophylaxis: Not indicated    --------------------------------------------------------------------  [x] High (any 2)    A. Problems (any 1)  [x] Acute/Chronic Illness/injury posing threat to life or bodily function: Sepsis, syncope  [] Severe exacerbation of chronic illness:    ---------------------------------------------------------------------  B. Risk of Treatment (any 1)   [x] Drugs/treatments that require intensive monitoring for toxicity include:    [x] IV ABX requiring serial renal monitoring for nephrotoxicity:     [] IV Narcotic analgesia for adverse drug reaction  [] Aggressive IV diuresis requiring serial monitoring for renal impairment and electrolyte derangements  [] Critical electrolyte abnormalities requiring IV replacement and close serial monitoring  [] Insulin - monitoring serial FSBS for Hypoglycemic adverse drug

## 2024-05-17 NOTE — H&P
History & Physical    Primary Care Provider: Demetrius Yoo APRN - NP    Chief complaint:   Chief Complaint   Patient presents with    Emesis    Loss of Consciousness        History of Presenting Illness:   Britta Quevedo is a 74 y.o. female with PMH of CKD, recent colostomy creation and other medical problems as below. Presented to the ED with chief complaint of loss of consciousness. Of note, she recently underwent colonoscopy complicated by bowel perforation. Subsequently underwent laparoscopic sigmoid colectomy with colostomy creation. She reports since discharged, has been mostly uneventful. Reports some pain at surgical site, but no purulent discharge or wound dehiscence. She denies fever or chills, productive cough, UTI symptoms or diarrhea. Does has shortness of breath at baseline, not worse than usual. She has been feeling well until yesterday evening, when she felt lightheaded and had a syncope/ presyncope event. Associated with vomiting afterwards.     In the ED, noted hypotensive and hypoxic requiring 4L NC. Lab significant for leukocytosis, ZULMA, hypokalemia. LA 4.9. urinalysis indicative of UTI. CT chest and abdomen no acute finding..       Chart review: none          Past Medical History:   Diagnosis Date    Arthritis     Chronic kidney disease     Depression     Endometriosis     Hyperlipemia         Past Surgical History:   Procedure Laterality Date    BACK SURGERY      CHOLECYSTECTOMY      COLONOSCOPY      COLONOSCOPY N/A 4/22/2024    COLONOSCOPY performed by Julian Pratt MD at [facility] ENDOSCOPY    HYSTERECTOMY (CERVIX STATUS UNKNOWN)      LAPAROSCOPY N/A 4/22/2024    LAPAROSCOPY DIAGNOSTIC, PARTIAL SIGMOID COLECTOMY WITH COLOSTOMY CREATION performed by Berto Yen MD at [facility] MAIN OR    TONSILLECTOMY         Family History   Problem Relation Age of Onset    Dementia Mother     Heart Disease Father     Heart Attack Brother     Brain Cancer Daughter         Social History     Socioeconomic

## 2024-05-17 NOTE — PROGRESS NOTES
Vancomycin Dosing Consult  Britta Quevedo is a 74 y.o. female with Septic Shock. Pharmacy was consulted by Dr. Fuentes to dose and monitor vancomycin. Today is day 1.    Antibiotic regimen: Vancomycin + Zosyn    Temp (24hrs), Av.1 °F (36.7 °C), Min:98 °F (36.7 °C), Max:98.3 °F (36.8 °C)    Recent Labs     24   WBC 16.9*  --    CREATININE 1.76* 1.50*   BUN 26*  --      Est CrCl: 31 mL/min  Concomitant nephrotoxic drugs: None    Cultures:    Urine - Many Epithelial cells    MRSA Swab: Not ordered, patient already received first dose of vancomycin    Target range: Re-dose for random level <15 mcg/mL      Assessment/Plan:   Leukocytosis; ZULMA; Will pulse dose vancomycin therapy for now until creatinine resolves. Pt already received an initial dose of Vancomycin 2000 mg iv x 1 earlier this morning. Will order a level for tonight at 2200 and re-dose if level is < 15.  Antimicrobial stop date 7 days

## 2024-05-17 NOTE — ED NOTES
ED TO INPATIENT SBAR HANDOFF    Patient Name: Britta Quevedo   Preferred Name: Britta TAYLOR  : 1950  74 y.o.   Family/Caregiver Present: no   Code Status Order: Full Code  PO Status:   Telemetry Order: yes  C-SSRS: Risk of Suicide: No Risk  Sitter no   Restraints:   no  Sepsis Risk Score Sepsis Risk Score: 2.28    Situation  Chief Complaint   Patient presents with    Emesis    Loss of Consciousness     Brief Description of Patient's Condition: Pt is alert and oriented x4 and on 4L NC. Pt does not wear O2 at home.   Mental Status: oriented and alert  Arrived from:Home  Imaging:   CT CHEST ABDOMEN PELVIS WO CONTRAST Additional Contrast? None   Final Result   No identified source of sepsis with no acute cardiopulmonary process   or acute abdominal or pelvic process with small ascites and additional   incidentals as above.      XR CHEST 1 VIEW   Final Result   No evidence of acute cardiopulmonary process.           Abnormal labs:   Abnormal Labs Reviewed   CBC WITH AUTO DIFFERENTIAL - Abnormal; Notable for the following components:       Result Value    WBC 16.9 (*)     Neutrophils % 86 (*)     Lymphocytes % 8 (*)     Immature Granulocytes % 1 (*)     Neutrophils Absolute 14.5 (*)     Immature Granulocytes Absolute 0.1 (*)     All other components within normal limits   COMPREHENSIVE METABOLIC PANEL - Abnormal; Notable for the following components:    Potassium 3.0 (*)     Glucose 216 (*)     BUN 26 (*)     Creatinine 1.76 (*)     Est, Glom Filt Rate 30 (*)     Alk Phosphatase 38 (*)     Total Protein 6.3 (*)     Albumin 3.4 (*)     All other components within normal limits   URINALYSIS WITH REFLEX TO CULTURE - Abnormal; Notable for the following components:    Appearance Turbid (*)     Protein, UA 30 (*)     Leukocyte Esterase, Urine Trace (*)     Epithelial Cells, UA Many (*)     BACTERIA, URINE 4+ (*)     Urine Culture if Indicated Urine Culture Ordered (*)     Mucus, UA Trace (*)     All other components within

## 2024-05-18 LAB
ANION GAP SERPL CALC-SCNC: 5 MMOL/L (ref 5–15)
BACTERIA SPEC CULT: NORMAL
BASOPHILS # BLD: 0 K/UL (ref 0–0.1)
BASOPHILS NFR BLD: 0 % (ref 0–1)
BUN SERPL-MCNC: 21 MG/DL (ref 6–20)
BUN/CREAT SERPL: 14 (ref 12–20)
CA-I BLD-MCNC: 9.1 MG/DL (ref 8.5–10.1)
CHLORIDE SERPL-SCNC: 113 MMOL/L (ref 97–108)
CO2 SERPL-SCNC: 26 MMOL/L (ref 21–32)
CREAT SERPL-MCNC: 1.53 MG/DL (ref 0.55–1.02)
DIFFERENTIAL METHOD BLD: ABNORMAL
ECHO AO ROOT DIAM: 2.5 CM
ECHO AO ROOT INDEX: 1.47 CM/M2
ECHO AV AREA PEAK VELOCITY: 1.7 CM2
ECHO AV AREA/BSA PEAK VELOCITY: 1 CM2/M2
ECHO AV PEAK GRADIENT: 16 MMHG
ECHO AV PEAK VELOCITY: 2 M/S
ECHO AV VELOCITY RATIO: 0.55
ECHO BSA: 1.77 M2
ECHO EST RA PRESSURE: 3 MMHG
ECHO IVC EXP: 1.5 CM
ECHO LA DIAMETER INDEX: 2.18 CM/M2
ECHO LA DIAMETER: 3.7 CM
ECHO LA TO AORTIC ROOT RATIO: 1.48
ECHO LV E' LATERAL VELOCITY: 9 CM/S
ECHO LV E' SEPTAL VELOCITY: 8 CM/S
ECHO LV FRACTIONAL SHORTENING: 46 % (ref 28–44)
ECHO LV INTERNAL DIMENSION DIASTOLE INDEX: 2.82 CM/M2
ECHO LV INTERNAL DIMENSION DIASTOLIC: 4.8 CM (ref 3.9–5.3)
ECHO LV INTERNAL DIMENSION SYSTOLIC INDEX: 1.53 CM/M2
ECHO LV INTERNAL DIMENSION SYSTOLIC: 2.6 CM
ECHO LV IVSD: 1 CM (ref 0.6–0.9)
ECHO LV MASS 2D: 181.9 G (ref 67–162)
ECHO LV MASS INDEX 2D: 107 G/M2 (ref 43–95)
ECHO LV POSTERIOR WALL DIASTOLIC: 1.1 CM (ref 0.6–0.9)
ECHO LV RELATIVE WALL THICKNESS RATIO: 0.46
ECHO LVOT AREA: 3.1 CM2
ECHO LVOT DIAM: 2 CM
ECHO LVOT PEAK GRADIENT: 5 MMHG
ECHO LVOT PEAK VELOCITY: 1.1 M/S
ECHO MV A VELOCITY: 1.11 M/S
ECHO MV E DECELERATION TIME (DT): 161 MS
ECHO MV E VELOCITY: 0.85 M/S
ECHO MV E/A RATIO: 0.77
ECHO MV E/E' LATERAL: 9.44
ECHO MV E/E' RATIO (AVERAGED): 10.03
ECHO MV E/E' SEPTAL: 10.63
ECHO PULMONARY ARTERY END DIASTOLIC PRESSURE: 3 MMHG
ECHO PV ACCELERATION TIME (AT): 90 MS
ECHO PV MAX VELOCITY: 1.6 M/S
ECHO PV PEAK GRADIENT: 10 MMHG
ECHO PV REGURGITANT MAX VELOCITY: 0.9 M/S
ECHO RA AREA 4C: 18.2 CM2
ECHO RA END SYSTOLIC VOLUME APICAL 4 CHAMBER INDEX BSA: 32 ML/M2
ECHO RA VOLUME: 54 ML
ECHO RIGHT VENTRICULAR SYSTOLIC PRESSURE (RVSP): 21 MMHG
ECHO RV FREE WALL PEAK S': 11 CM/S
ECHO RV INTERNAL DIMENSION: 3.1 CM
ECHO RV TAPSE: 1.8 CM (ref 1.7–?)
ECHO TV MAX VELOCITY: 2.1 M/S
ECHO TV REGURGITANT MAX VELOCITY: 2.11 M/S
ECHO TV REGURGITANT PEAK GRADIENT: 18 MMHG
EOSINOPHIL # BLD: 0 K/UL (ref 0–0.4)
EOSINOPHIL NFR BLD: 0 % (ref 0–7)
ERYTHROCYTE [DISTWIDTH] IN BLOOD BY AUTOMATED COUNT: 13.9 % (ref 11.5–14.5)
GLUCOSE SERPL-MCNC: 113 MG/DL (ref 65–100)
HCT VFR BLD AUTO: 28.7 % (ref 35–47)
HGB BLD-MCNC: 9.2 G/DL (ref 11.5–16)
IMM GRANULOCYTES # BLD AUTO: 0 K/UL
IMM GRANULOCYTES NFR BLD AUTO: 0 %
LYMPHOCYTES # BLD: 0 K/UL (ref 0.8–3.5)
LYMPHOCYTES NFR BLD: 0 % (ref 12–49)
Lab: NORMAL
MCH RBC QN AUTO: 29.9 PG (ref 26–34)
MCHC RBC AUTO-ENTMCNC: 32.1 G/DL (ref 30–36.5)
MCV RBC AUTO: 93.2 FL (ref 80–99)
MONOCYTES # BLD: 0 K/UL (ref 0–1)
MONOCYTES NFR BLD: 0 % (ref 5–13)
MRSA DNA SPEC QL NAA+PROBE: NOT DETECTED
NEUTS SEG # BLD: 0 K/UL (ref 1.8–8)
NEUTS SEG NFR BLD: 0 % (ref 32–75)
NRBC # BLD: 0 K/UL (ref 0–0.01)
NRBC BLD-RTO: 0 PER 100 WBC
PLATELET # BLD AUTO: 185 K/UL (ref 150–400)
PMV BLD AUTO: 11.1 FL (ref 8.9–12.9)
POTASSIUM SERPL-SCNC: 2.6 MMOL/L (ref 3.5–5.1)
PROCALCITONIN SERPL-MCNC: 0.51 NG/ML
RBC # BLD AUTO: 3.08 M/UL (ref 3.8–5.2)
RBC MORPH BLD: ABNORMAL
SODIUM SERPL-SCNC: 144 MMOL/L (ref 136–145)
WBC # BLD AUTO: 4.4 K/UL (ref 3.6–11)

## 2024-05-18 PROCEDURE — 80048 BASIC METABOLIC PNL TOTAL CA: CPT

## 2024-05-18 PROCEDURE — 36415 COLL VENOUS BLD VENIPUNCTURE: CPT

## 2024-05-18 PROCEDURE — 2580000003 HC RX 258: Performed by: INTERNAL MEDICINE

## 2024-05-18 PROCEDURE — 6370000000 HC RX 637 (ALT 250 FOR IP): Performed by: PHYSICIAN ASSISTANT

## 2024-05-18 PROCEDURE — 6360000002 HC RX W HCPCS: Performed by: INTERNAL MEDICINE

## 2024-05-18 PROCEDURE — 1100000000 HC RM PRIVATE

## 2024-05-18 PROCEDURE — 84145 PROCALCITONIN (PCT): CPT

## 2024-05-18 PROCEDURE — 87641 MR-STAPH DNA AMP PROBE: CPT

## 2024-05-18 PROCEDURE — 6370000000 HC RX 637 (ALT 250 FOR IP): Performed by: INTERNAL MEDICINE

## 2024-05-18 PROCEDURE — 6360000002 HC RX W HCPCS: Performed by: PHYSICIAN ASSISTANT

## 2024-05-18 PROCEDURE — 85025 COMPLETE CBC W/AUTO DIFF WBC: CPT

## 2024-05-18 RX ORDER — POTASSIUM CHLORIDE 7.45 MG/ML
10 INJECTION INTRAVENOUS
Status: COMPLETED | OUTPATIENT
Start: 2024-05-18 | End: 2024-05-18

## 2024-05-18 RX ORDER — POTASSIUM CHLORIDE 20 MEQ/1
40 TABLET, EXTENDED RELEASE ORAL 2 TIMES DAILY
Status: COMPLETED | OUTPATIENT
Start: 2024-05-18 | End: 2024-05-18

## 2024-05-18 RX ADMIN — POTASSIUM CHLORIDE 10 MEQ: 7.46 INJECTION, SOLUTION INTRAVENOUS at 09:00

## 2024-05-18 RX ADMIN — PIPERACILLIN AND TAZOBACTAM 3375 MG: 3; .375 INJECTION, POWDER, LYOPHILIZED, FOR SOLUTION INTRAVENOUS at 15:01

## 2024-05-18 RX ADMIN — ENOXAPARIN SODIUM 40 MG: 100 INJECTION SUBCUTANEOUS at 08:13

## 2024-05-18 RX ADMIN — PIPERACILLIN AND TAZOBACTAM 3375 MG: 3; .375 INJECTION, POWDER, LYOPHILIZED, FOR SOLUTION INTRAVENOUS at 21:58

## 2024-05-18 RX ADMIN — ESCITALOPRAM OXALATE 20 MG: 10 TABLET ORAL at 08:22

## 2024-05-18 RX ADMIN — SODIUM CHLORIDE, PRESERVATIVE FREE 10 ML: 5 INJECTION INTRAVENOUS at 08:23

## 2024-05-18 RX ADMIN — ATORVASTATIN CALCIUM 40 MG: 40 TABLET, FILM COATED ORAL at 08:13

## 2024-05-18 RX ADMIN — SODIUM CHLORIDE, PRESERVATIVE FREE 10 ML: 5 INJECTION INTRAVENOUS at 21:19

## 2024-05-18 RX ADMIN — POTASSIUM CHLORIDE 40 MEQ: 1500 TABLET, EXTENDED RELEASE ORAL at 08:13

## 2024-05-18 RX ADMIN — PIPERACILLIN AND TAZOBACTAM 3375 MG: 3; .375 INJECTION, POWDER, LYOPHILIZED, FOR SOLUTION INTRAVENOUS at 06:09

## 2024-05-18 RX ADMIN — POTASSIUM CHLORIDE 10 MEQ: 7.46 INJECTION, SOLUTION INTRAVENOUS at 08:00

## 2024-05-18 RX ADMIN — POTASSIUM CHLORIDE 10 MEQ: 7.46 INJECTION, SOLUTION INTRAVENOUS at 10:17

## 2024-05-18 RX ADMIN — POTASSIUM CHLORIDE 40 MEQ: 1500 TABLET, EXTENDED RELEASE ORAL at 21:17

## 2024-05-18 NOTE — PLAN OF CARE
Problem: Safety - Adult  Goal: Free from fall injury  Outcome: Progressing  Note: Bed alarm is on, the bed is in the lowest position and wheels are locked, call bell is within reach, bathroom light is on during evening hours, gripper socks are on and patient has been instructed to call out for assistance if needed.     As of now, patient is free from falls and will continue to be monitored.

## 2024-05-18 NOTE — PROGRESS NOTES
Vancomycin Dosing Consult  Britta Quevedo is a 74 y.o. female with Septic Shock. Pharmacy was consulted by Dr. Fuentes to dose and monitor vancomycin. Today is day 2.    Antibiotic regimen: Vancomycin + Zosyn    Temp (24hrs), Av.3 °F (36.8 °C), Min:98 °F (36.7 °C), Max:99.1 °F (37.3 °C)    Recent Labs     24  1202   WBC 16.9*  --  6.7   CREATININE 1.76* 1.50* 1.74*   BUN 26*  --  25*     Est CrCl: 27 mL/min  Concomitant nephrotoxic drugs: None    Cultures:     Blood x 2 - NG x 13 hrs    Urine - Pending    MRSA Swab: Not ordered, patient already received first dose of vancomycin    Target range: Re-dose for random level <15 mcg/mL    Recent level history:  Date/Time Dose & Interval Measured Level (mcg/mL) Associated AUC/SAIGE    @ 2200  2000 mg x 1  21.9  N/A        Assessment/Plan:   ZULMA persists; Continue pulse dosing for now. Level that resulted last night was supratherapeutic. Will hold Vancomycin today and order another level tomorrow with Am labs. Pharmacy will re-dose if level has dropped to < 15.  Antimicrobial stop date 7 days

## 2024-05-18 NOTE — PLAN OF CARE
Problem: Safety - Adult  Goal: Free from fall injury  5/18/2024 0826 by Inessa Barkley, RN  Outcome: Progressing  5/17/2024 2304 by Laila Jackson, RN  Outcome: Progressing  Note: Bed alarm is on, the bed is in the lowest position and wheels are locked, call bell is within reach, bathroom light is on during evening hours, gripper socks are on and patient has been instructed to call out for assistance if needed.     As of now, patient is free from falls and will continue to be monitored.

## 2024-05-18 NOTE — PROGRESS NOTES
Hospitalist Progress Note    NAME:   Britta Quevedo   : 1950   MRN: [id number]     Date/Time: 2024 9:33 AM  Patient PCP: Demetrius Yoo APRN - NP    Estimated discharge date: 24-48 hours  Barriers: IV antibiotics, cultures, ZULMA resolution, echo, hypokalemia      Assessment / Plan:  Sepsis, resolving  - Source: most likely UTI. No other obvious source of infection  - Continue IV Vanco and Zosyn, screen for MRSA in nares, if negative discontinue Vanco  - Continue IVF  - Blood culture NGTD  - Lactic normal  - Urine culture pending    Syncope  - Reports passing out twice while seated  - Continue telemetry  - Possibly related to dehydration/UTI/sepsis  - TSH within normal limits  - Echo pending  - A1c 5.6%     UTI  - IV zosyn empirically  - Urine culture pending     ZULMA  - secondary to sepsis  - Continue IVF  - Creatinine improved from 1.7-1.5, baseline 0.8     Hypokalemia  - secondary to dehydration from sepsis  - Potassium 2.6 morning, replete via oral and IV formulations  - Magnesium within normal limits     Bowel perforation s/p colostomy  - colostomy care pending     Essential hypertension   - Hold home lisinopril 20 HCTZ 25 mg given hypotension     HLP   - Continue home statin    Code Status: Full  DVT Prophylaxis: Lovenox  GI Prophylaxis: Not indicated    --------------------------------------------------------------------  [x] High (any 2)    A. Problems (any 1)  [x] Acute/Chronic Illness/injury posing threat to life or bodily function: Sepsis, syncope  [] Severe exacerbation of chronic illness:    ---------------------------------------------------------------------  B. Risk of Treatment (any 1)   [x] Drugs/treatments that require intensive monitoring for toxicity include:    [x] IV ABX requiring serial renal monitoring for nephrotoxicity:     [] IV Narcotic analgesia for adverse drug reaction  [] Aggressive IV diuresis requiring serial monitoring for renal impairment and electrolyte

## 2024-05-19 PROBLEM — A41.9 SEPSIS (HCC): Status: RESOLVED | Noted: 2024-05-17 | Resolved: 2024-05-19

## 2024-05-19 LAB
ANION GAP SERPL CALC-SCNC: 5 MMOL/L (ref 5–15)
BASOPHILS # BLD: 0 K/UL (ref 0–0.1)
BASOPHILS NFR BLD: 1 % (ref 0–1)
BUN SERPL-MCNC: 14 MG/DL (ref 6–20)
BUN/CREAT SERPL: 11 (ref 12–20)
CA-I BLD-MCNC: 9.4 MG/DL (ref 8.5–10.1)
CHLORIDE SERPL-SCNC: 116 MMOL/L (ref 97–108)
CO2 SERPL-SCNC: 25 MMOL/L (ref 21–32)
CREAT SERPL-MCNC: 1.22 MG/DL (ref 0.55–1.02)
DIFFERENTIAL METHOD BLD: ABNORMAL
EOSINOPHIL # BLD: 0.2 K/UL (ref 0–0.4)
EOSINOPHIL NFR BLD: 5 % (ref 0–7)
ERYTHROCYTE [DISTWIDTH] IN BLOOD BY AUTOMATED COUNT: 13.7 % (ref 11.5–14.5)
GLUCOSE SERPL-MCNC: 108 MG/DL (ref 65–100)
HCT VFR BLD AUTO: 29.5 % (ref 35–47)
HGB BLD-MCNC: 9.5 G/DL (ref 11.5–16)
IMM GRANULOCYTES # BLD AUTO: 0 K/UL (ref 0–0.04)
IMM GRANULOCYTES NFR BLD AUTO: 0 % (ref 0–0.5)
LYMPHOCYTES # BLD: 0.4 K/UL (ref 0.8–3.5)
LYMPHOCYTES NFR BLD: 9 % (ref 12–49)
MCH RBC QN AUTO: 29.4 PG (ref 26–34)
MCHC RBC AUTO-ENTMCNC: 32.2 G/DL (ref 30–36.5)
MCV RBC AUTO: 91.3 FL (ref 80–99)
MONOCYTES # BLD: 0.6 K/UL (ref 0–1)
MONOCYTES NFR BLD: 14 % (ref 5–13)
NEUTS SEG # BLD: 3.1 K/UL (ref 1.8–8)
NEUTS SEG NFR BLD: 71 % (ref 32–75)
NRBC # BLD: 0 K/UL (ref 0–0.01)
NRBC BLD-RTO: 0 PER 100 WBC
PLATELET # BLD AUTO: 192 K/UL (ref 150–400)
PMV BLD AUTO: 11.2 FL (ref 8.9–12.9)
POTASSIUM SERPL-SCNC: 4 MMOL/L (ref 3.5–5.1)
PROCALCITONIN SERPL-MCNC: 0.26 NG/ML
RBC # BLD AUTO: 3.23 M/UL (ref 3.8–5.2)
SODIUM SERPL-SCNC: 146 MMOL/L (ref 136–145)
WBC # BLD AUTO: 4.3 K/UL (ref 3.6–11)

## 2024-05-19 PROCEDURE — 80048 BASIC METABOLIC PNL TOTAL CA: CPT

## 2024-05-19 PROCEDURE — 1100000000 HC RM PRIVATE

## 2024-05-19 PROCEDURE — 6360000002 HC RX W HCPCS: Performed by: INTERNAL MEDICINE

## 2024-05-19 PROCEDURE — 6370000000 HC RX 637 (ALT 250 FOR IP): Performed by: INTERNAL MEDICINE

## 2024-05-19 PROCEDURE — 85025 COMPLETE CBC W/AUTO DIFF WBC: CPT

## 2024-05-19 PROCEDURE — 2580000003 HC RX 258: Performed by: INTERNAL MEDICINE

## 2024-05-19 PROCEDURE — 36415 COLL VENOUS BLD VENIPUNCTURE: CPT

## 2024-05-19 PROCEDURE — 84145 PROCALCITONIN (PCT): CPT

## 2024-05-19 RX ORDER — AMOXICILLIN AND CLAVULANATE POTASSIUM 875; 125 MG/1; MG/1
1 TABLET, FILM COATED ORAL 2 TIMES DAILY
Qty: 10 TABLET | Refills: 0 | Status: SHIPPED | OUTPATIENT
Start: 2024-05-19 | End: 2024-05-24

## 2024-05-19 RX ADMIN — SODIUM CHLORIDE, PRESERVATIVE FREE 10 ML: 5 INJECTION INTRAVENOUS at 21:26

## 2024-05-19 RX ADMIN — PIPERACILLIN AND TAZOBACTAM 3375 MG: 3; .375 INJECTION, POWDER, LYOPHILIZED, FOR SOLUTION INTRAVENOUS at 21:24

## 2024-05-19 RX ADMIN — SODIUM CHLORIDE, PRESERVATIVE FREE 10 ML: 5 INJECTION INTRAVENOUS at 09:38

## 2024-05-19 RX ADMIN — PIPERACILLIN AND TAZOBACTAM 3375 MG: 3; .375 INJECTION, POWDER, LYOPHILIZED, FOR SOLUTION INTRAVENOUS at 14:57

## 2024-05-19 RX ADMIN — ESCITALOPRAM OXALATE 20 MG: 10 TABLET ORAL at 09:38

## 2024-05-19 RX ADMIN — PIPERACILLIN AND TAZOBACTAM 3375 MG: 3; .375 INJECTION, POWDER, LYOPHILIZED, FOR SOLUTION INTRAVENOUS at 06:05

## 2024-05-19 RX ADMIN — ENOXAPARIN SODIUM 40 MG: 100 INJECTION SUBCUTANEOUS at 09:38

## 2024-05-19 RX ADMIN — ATORVASTATIN CALCIUM 40 MG: 40 TABLET, FILM COATED ORAL at 09:38

## 2024-05-19 ASSESSMENT — PAIN SCALES - GENERAL
PAINLEVEL_OUTOF10: 0
PAINLEVEL_OUTOF10: 0

## 2024-05-19 NOTE — DISCHARGE SUMMARY
ultimately negative for UTI.  At this time patient is felt medically stable for discharge.  She will follow-up with below listed PCP, cardiology.  She should have lab work rechecked with PCP to reassess potassium level and WBCs, as well as should discuss possible Holter monitor with cardiology.  Patient reports feeling slightly weak this morning, will have PT/OT evaluate patient prior to discharge.  Final discharge is pending PT/OT rec.    Discharge Exam:  Patient seen and examined by me on discharge day.  Pertinent Findings:  Patient Vitals for the past 24 hrs:   BP Temp Temp src Pulse Resp SpO2   05/19/24 0845 129/70 98.3 °F (36.8 °C) Oral 76 18 97 %   05/19/24 0401 (!) 120/58 98.6 °F (37 °C) Oral 87 16 94 %   05/18/24 2345 117/76 98.6 °F (37 °C) Oral 86 16 97 %   05/18/24 2030 (!) 109/54 98.9 °F (37.2 °C) Oral 93 18 95 %   05/18/24 1536 (!) 152/73 98.2 °F (36.8 °C) Oral 79 18 97 %   05/18/24 1501 (!) 133/57 98.8 °F (37.1 °C) Oral 83 16 95 %   05/18/24 1059 (!) 146/75 98.1 °F (36.7 °C) Oral 73 16 97 %       Physical Exam  Constitutional:       General: She is not in acute distress.     Appearance: Normal appearance. She is not ill-appearing.   Cardiovascular:      Rate and Rhythm: Normal rate and regular rhythm.      Pulses: Normal pulses.   Pulmonary:      Effort: Pulmonary effort is normal. No respiratory distress.      Breath sounds: Normal breath sounds. No wheezing.   Abdominal:      General: Abdomen is flat. There is no distension.      Palpations: Abdomen is soft.      Tenderness: There is no abdominal tenderness.      Comments: Colostomy with appropriate output   Musculoskeletal:         General: No swelling or tenderness. Normal range of motion.   Skin:     General: Skin is warm and dry.   Neurological:      Mental Status: She is alert and oriented to person, place, and time. Mental status is at baseline.   Psychiatric:         Mood and Affect: Mood normal.         Behavior: Behavior normal.

## 2024-05-19 NOTE — PLAN OF CARE
Problem: Skin/Tissue Integrity  Goal: Absence of new skin breakdown  Description: 1.  Monitor for areas of redness and/or skin breakdown  2.  Assess vascular access sites hourly  3.  Every 4-6 hours minimum:  Change oxygen saturation probe site  4.  Every 4-6 hours:  If on nasal continuous positive airway pressure, respiratory therapy assess nares and determine need for appliance change or resting period.  5/19/2024 1958 by Diana Grace LPN  Outcome: Progressing  5/19/2024 1823 by Leny Leone, RN  Outcome: Progressing     Problem: Safety - Adult  Goal: Free from fall injury  5/19/2024 1958 by Diana Grace LPN  Outcome: Progressing  5/19/2024 1823 by Leny Leone, RN  Outcome: Progressing     Problem: Discharge Planning  Goal: Discharge to home or other facility with appropriate resources  5/19/2024 1958 by Diana Grace LPN  Outcome: Progressing  5/19/2024 1823 by Leny Leone, RN  Outcome: Progressing

## 2024-05-19 NOTE — CASE COMMUNICATION
Discharge Noted:  Patient will discharge Monday after PT/OT. Patient has been accepted to [facility].    Anticipated start date: 5/20/24

## 2024-05-19 NOTE — PLAN OF CARE
Problem: Skin/Tissue Integrity  Goal: Absence of new skin breakdown  Description: 1.  Monitor for areas of redness and/or skin breakdown  2.  Assess vascular access sites hourly  3.  Every 4-6 hours minimum:  Change oxygen saturation probe site  4.  Every 4-6 hours:  If on nasal continuous positive airway pressure, respiratory therapy assess nares and determine need for appliance change or resting period.  Outcome: Progressing     Problem: Safety - Adult  Goal: Free from fall injury  5/18/2024 2202 by Diana Grace LPN  Outcome: Progressing  5/18/2024 0826 by Inessa Barkley RN  Outcome: Progressing     Problem: Discharge Planning  Goal: Discharge to home or other facility with appropriate resources  Outcome: Progressing

## 2024-05-19 NOTE — PROGRESS NOTES
Assessment complete. Patient denies pain. Abdominal incision healed. Colostomy output loose. Denies any difficulty urinating. Plan of care reviewed with patient

## 2024-05-20 VITALS
BODY MASS INDEX: 33.47 KG/M2 | SYSTOLIC BLOOD PRESSURE: 148 MMHG | WEIGHT: 166 LBS | OXYGEN SATURATION: 99 % | HEART RATE: 85 BPM | HEIGHT: 59 IN | RESPIRATION RATE: 14 BRPM | TEMPERATURE: 98.8 F | DIASTOLIC BLOOD PRESSURE: 78 MMHG

## 2024-05-20 PROCEDURE — 6360000002 HC RX W HCPCS: Performed by: INTERNAL MEDICINE

## 2024-05-20 PROCEDURE — 6370000000 HC RX 637 (ALT 250 FOR IP): Performed by: INTERNAL MEDICINE

## 2024-05-20 PROCEDURE — 97165 OT EVAL LOW COMPLEX 30 MIN: CPT

## 2024-05-20 PROCEDURE — 97530 THERAPEUTIC ACTIVITIES: CPT

## 2024-05-20 PROCEDURE — 97161 PT EVAL LOW COMPLEX 20 MIN: CPT

## 2024-05-20 PROCEDURE — 2580000003 HC RX 258: Performed by: INTERNAL MEDICINE

## 2024-05-20 RX ADMIN — ESCITALOPRAM OXALATE 20 MG: 10 TABLET ORAL at 08:05

## 2024-05-20 RX ADMIN — ATORVASTATIN CALCIUM 40 MG: 40 TABLET, FILM COATED ORAL at 08:06

## 2024-05-20 RX ADMIN — PIPERACILLIN AND TAZOBACTAM 3375 MG: 3; .375 INJECTION, POWDER, LYOPHILIZED, FOR SOLUTION INTRAVENOUS at 13:43

## 2024-05-20 RX ADMIN — ENOXAPARIN SODIUM 40 MG: 100 INJECTION SUBCUTANEOUS at 08:06

## 2024-05-20 RX ADMIN — PIPERACILLIN AND TAZOBACTAM 3375 MG: 3; .375 INJECTION, POWDER, LYOPHILIZED, FOR SOLUTION INTRAVENOUS at 06:58

## 2024-05-20 RX ADMIN — SODIUM CHLORIDE, PRESERVATIVE FREE 10 ML: 5 INJECTION INTRAVENOUS at 08:06

## 2024-05-20 ASSESSMENT — PAIN SCALES - GENERAL: PAINLEVEL_OUTOF10: 0

## 2024-05-20 NOTE — PLAN OF CARE
OCCUPATIONAL THERAPY EVALUATION  Patient: Britta Quevedo (74 y.o. female)  Date: 5/20/2024  Primary Diagnosis: Septic shock (HCC) [A41.9, R65.21]  Sepsis (HCC) [A41.9]       Precautions: Fall Risk                Recommendations for nursing mobility: Out of bed to chair for meals, Encourage HEP in prep for ADLs/mobility; see handout for details, Frequent repositioning to prevent skin breakdown, Use of bed/chair alarm for safety, LE elevation for management of edema, AD and gt belt for bed to chair , Amb to bathroom with AD and gait belt, and Assist x1    In place during session:Peripheral IV and EKG/telemetry   ASSESSMENT  Pt is a 74 y.o. female presenting to [facility] with c/o LOC, admitted 5/16/24 and currently being treated for sepsis, syncope, UTI, ZULMA, hypokalemia, bowel perforation s/p colostomy, HTN, HLP. Pt received semi-supine in bed upon arrival, AXO x4, and agreeable to OT evaluation.     Based on current observations, pt presents with decreased  functional mobility, independence in ADLs, high-level IADLs, ROM, strength, body mechanics, activity tolerance, endurance, balance, posture (see below for objective details and assist levels).     Overall, pt tolerates session fair with no c/o pain. Bed mobility completed with min A overall. Pt handed off to PT for hallway functional mobility. Pt then returned to room and handed back to OT for remainder of session. Pt able to complete toileting routine with CGA for transfer and supervision for mandy care. Pt able to stand at sink and wash face with SBA. Pt then ambulated back to bedside and returned to bed. Pt will benefit from continued skilled OT services to address current impairments and improve IND and safety with self cares and functional transfers/mobility. Potential barriers for safe discharge: pt is a high fall risk. Current OT d/c recommendation Home with Home Health Therapy and family assistonce medically appropriate.    GOALS:    Problem: Occupational Therapy

## 2024-05-20 NOTE — PROGRESS NOTES
Discharge paperwork complete except for follow up appointments. [place] to make. Print and send with with patient when patient is ready to leave. Primary nurse aware.

## 2024-05-20 NOTE — CARE COORDINATION
Transition of Care Plan:    RUR: 14%  Prior Level of Functioning: Independent  Disposition: Home w/HH via [facility]  Follow up appointments: PCP, Cardiology  DME needed: N/A  Transportation at discharge: Family ([place]) @ 17:30PM  IM/IMM Medicare/ letter given: Medicare pt has received, reviewed, and signed 2nd IM letter informing them of their right to appeal the discharge.  Signed copy has been placed on pt bedside chart.  Issued on 19 May 2024  Is patient a  and connected with [facility]?   If yes, was  transfer form completed and [facility] notified?   Caregiver Contact:   Discharge Caregiver contacted prior to discharge? Patient contacted her daughter.  Care Conference needed? N/A  Barriers to discharge:     Daughter gets off work @ 17:00PM, patient anticipates daughter to arrive by 17:30PM      ALEXANDER Cortez  [phone]    
HealthCare Decision Makers including selection of the Healthcare Decision Maker Relationship (ie \"Primary\")       Content/Action Overview:  Reviewed DNR/DNI and patient elects Full Code (Attempt Resuscitation)    Length of Voluntary ACP Conversation in minutes:  <16 minutes (Non-Billable)            Readmission Assessment  Number of Days since last admission?: 8-30 days  Previous Disposition: Home with Home Health  Who is being Interviewed: Patient  What was the patient's/caregiver's perception as to why they think they needed to return back to the hospital?: Other (Comment) (Readmit)  Did you visit your Primary Care Physician after you left the hospital, before you returned this time?: No  Why weren't you able to visit your PCP?: Other (Comment) (Readmit)  Did you see a specialist, such as Cardiac, Pulmonary, Orthopedic Physician, etc. after you left the hospital?: No (Readmit)  Who advised the patient to return to the hospital?: Caregiver  Does the patient report anything that got in the way of taking their medications?: No  In our efforts to provide the best possible care to you and others like you, can you think of anything that we could have done to help you after you left the hospital the first time, so that you might not have needed to return so soon?: Teach back instructions regarding management of illness, Identify patient's health literacy needs, Discharge instructions that are concise, clear, and non contradictory      ALEXANDER Cortez

## 2024-05-20 NOTE — DISCHARGE SUMMARY
was dehydration.    Discharge Exam:  Patient seen and examined by me on discharge day.  Pertinent Findings:  Patient Vitals for the past 24 hrs:   BP Temp Temp src Pulse Resp SpO2   05/20/24 1130 138/76 98.8 °F (37.1 °C) Oral 81 18 97 %   05/20/24 0726 (!) 167/94 98.4 °F (36.9 °C) Oral 93 16 98 %   05/20/24 0503 134/62 98.6 °F (37 °C) Oral 63 16 96 %   05/20/24 0045 128/71 98.2 °F (36.8 °C) Oral 78 18 92 %   05/19/24 2045 (!) 150/69 99.1 °F (37.3 °C) Oral 82 15 96 %   05/19/24 1600 -- -- -- 80 -- --   05/19/24 1518 (!) 147/75 98.1 °F (36.7 °C) Oral 81 14 93 %         Physical Exam  Constitutional:       General: She is not in acute distress.     Appearance: Normal appearance. She is not ill-appearing.   Cardiovascular:      Rate and Rhythm: Normal rate and regular rhythm.      Pulses: Normal pulses.   Pulmonary:      Effort: Pulmonary effort is normal. No respiratory distress.      Breath sounds: Normal breath sounds. No wheezing.   Abdominal:      General: Abdomen is flat. There is no distension.      Palpations: Abdomen is soft.      Tenderness: There is no abdominal tenderness.      Comments: Colostomy with appropriate output   Musculoskeletal:         General: No swelling or tenderness. Normal range of motion.   Skin:     General: Skin is warm and dry.   Neurological:      Mental Status: She is alert and oriented to person, place, and time. Mental status is at baseline.   Psychiatric:         Mood and Affect: Mood normal.         Behavior: Behavior normal.         Discharge/Recent Laboratory Results:  Recent Labs     05/17/24  1202 05/18/24  0556 05/19/24  0609      < > 146*   K 3.2*   < > 4.0   *   < > 116*   CO2 23   < > 25   BUN 25*   < > 14   CREATININE 1.74*   < > 1.22*   GLUCOSE 144*   < > 108*   CALCIUM 9.0   < > 9.4   MG 1.8  --   --     < > = values in this interval not displayed.       Recent Labs     05/19/24  0609   HGB 9.5*   HCT 29.5*   WBC 4.3            Discharge Medications:

## 2024-05-20 NOTE — PLAN OF CARE
PHYSICAL THERAPY EVALUATION  Patient: Britta Quevedo (74 y.o. female)  Date: 5/20/2024  Primary Diagnosis: Septic shock (HCC) [A41.9, R65.21]  Sepsis (HCC) [A41.9]       Precautions: Fall Risk, General Precautions                      Recommendations for nursing mobility: Amb in hallway    In place during session: Peripheral IV and EKG/telemetry     ASSESSMENT  Pt is a 74 y.o. female admitted on 5/16/2024 for LOC; pt currently being treated for sepsis, syncope, UTI, ZULMA, hypokalemia, bowel perforation s/p colostomy, HTN, HLP . Pt semi supine  upon PT arrival, agreeable to evaluation. Pt A&O x 4.      Based on the objective data described below, the patient currently presents with impaired ability to perform high-level IADLs, impaired strength, poor safety awareness, impaired balance, and impaired posture. (See below for objective details and assist levels).     Overall pt tolerated session fair today with PT. Pt required cg  for bed mobility and transfers. Pt amb 14 feet with RW, gt belt and min assist; demonstrates fair endurance to activities. Pt will benefit from continued skilled PT to address above deficits and return to PLOF. Potential barriers for safe discharge: . Current PT DC recommendation Home with Home Health Therapy and family assist once medically appropriate.      GOALS:    Problem: Physical Therapy - Adult  Goal: By Discharge: Performs mobility at highest level of function for planned discharge setting.  See evaluation for individualized goals.  Description: FUNCTIONAL STATUS PRIOR TO ADMISSION: Patient was modified independent using a rolling walker for functional mobility.    HOME SUPPORT PRIOR TO ADMISSION: The patient lived with daughter but did not require assistance.    Physical Therapy Goals  Initiated 5/20/2024  Pt stated goal: Get better, Go home  Pt will be I with LE HEP in 7 days.  Pt will perform bed mobility with MI in 7 days.  Pt will perform transfers with Modified [place] in 7

## 2024-05-20 NOTE — PLAN OF CARE
Problem: Skin/Tissue Integrity  Goal: Absence of new skin breakdown  Description: 1.  Monitor for areas of redness and/or skin breakdown  2.  Assess vascular access sites hourly  3.  Every 4-6 hours minimum:  Change oxygen saturation probe site  4.  Every 4-6 hours:  If on nasal continuous positive airway pressure, respiratory therapy assess nares and determine need for appliance change or resting period.  5/20/2024 1242 by Janet Saucedo RN  Outcome: Adequate for Discharge  5/20/2024 0822 by Renata Almeida LPN  Outcome: Progressing     Problem: Safety - Adult  Goal: Free from fall injury  5/20/2024 1242 by Janet Saucedo RN  Outcome: Adequate for Discharge  5/20/2024 0822 by Renata Almeida LPN  Outcome: Progressing     Problem: Discharge Planning  Goal: Discharge to home or other facility with appropriate resources  5/20/2024 1242 by Janet Saucedo, RN  Outcome: Adequate for Discharge  5/20/2024 0822 by Renata Almeida LPN  Outcome: Progressing

## 2024-05-20 NOTE — PLAN OF CARE
Problem: Occupational Therapy - Adult  Goal: By Discharge: Performs self-care activities at highest level of function for planned discharge setting.  See evaluation for individualized goals.  Description: FUNCTIONAL STATUS PRIOR TO ADMISSION:  Pt was modified independent using RW for functional mobility. Pt was independent for ADLs.    HOME SUPPORT: The patient lived with her daughter but did not require assistance.    Occupational Therapy Goals:  Initiated 5/20/2024  Patient/Family stated goal: Go home  1.  Patient will perform lower body dressing with [place] within 7 day(s).  2.  Patient will perform grooming with [place] within 7 day(s).  3.  Patient will perform bathing with [place] within 7 day(s).  4.  Patient will perform toilet transfers with [place]  within 7 day(s).  5.  Patient will perform all aspects of toileting with [place] within 7 day(s).  6.  Patient will participate in upper extremity therapeutic exercise/activities with [place] within 7 day(s).  5/20/2024 1501 by Saba Cohen OT  Outcome: Progressing     Problem: Physical Therapy - Adult  Goal: By Discharge: Performs mobility at highest level of function for planned discharge setting.  See evaluation for individualized goals.  Description: FUNCTIONAL STATUS PRIOR TO ADMISSION: Patient was modified independent using a rolling walker for functional mobility.    HOME SUPPORT PRIOR TO ADMISSION: The patient lived with daughter but did not require assistance.    Physical Therapy Goals  Initiated 5/20/2024  Pt stated goal: Get better, Go home  Pt will be I with LE HEP in 7 days.  Pt will perform bed mobility with MI in 7 days.  Pt will perform transfers with Modified [place] in 7 days.   Pt will amb 150 feet with LRAD safely with Stand by Assist in 7 days.  Pt will verbalize and demonstrate compliance with fall precautions  in 7 days.   Pt will demonstrate improvement in standing balance from fair to good in 7

## 2024-05-20 NOTE — PLAN OF CARE
Problem: Skin/Tissue Integrity  Goal: Absence of new skin breakdown  Description: 1.  Monitor for areas of redness and/or skin breakdown  2.  Assess vascular access sites hourly  3.  Every 4-6 hours minimum:  Change oxygen saturation probe site  4.  Every 4-6 hours:  If on nasal continuous positive airway pressure, respiratory therapy assess nares and determine need for appliance change or resting period.  5/20/2024 0822 by Renata Almeida LPN  Outcome: Progressing  5/19/2024 1958 by Diana Grace LPN  Outcome: Progressing  5/19/2024 1823 by Leny Leone RN  Outcome: Progressing     Problem: Safety - Adult  Goal: Free from fall injury  5/20/2024 0822 by Renata Almeida LPN  Outcome: Progressing  5/19/2024 1958 by Diana Grace LPN  Outcome: Progressing  5/19/2024 1823 by Leny Leone RN  Outcome: Progressing     Problem: Discharge Planning  Goal: Discharge to home or other facility with appropriate resources  5/20/2024 0822 by Renata Almeida LPN  Outcome: Progressing  5/19/2024 1958 by Diana Grace LPN  Outcome: Progressing  5/19/2024 1823 by Leny Leone RN  Outcome: Progressing

## 2024-05-22 LAB
BACTERIA SPEC CULT: NORMAL
BACTERIA SPEC CULT: NORMAL
Lab: NORMAL
Lab: NORMAL

## 2024-06-03 ENCOUNTER — OFFICE VISIT (OUTPATIENT)
Age: 74
End: 2024-06-03

## 2024-06-03 VITALS
OXYGEN SATURATION: 95 % | SYSTOLIC BLOOD PRESSURE: 147 MMHG | BODY MASS INDEX: 32.36 KG/M2 | HEIGHT: 59 IN | RESPIRATION RATE: 16 BRPM | TEMPERATURE: 98.1 F | HEART RATE: 90 BPM | WEIGHT: 160.5 LBS | DIASTOLIC BLOOD PRESSURE: 86 MMHG

## 2024-06-03 DIAGNOSIS — Z48.89 POSTOPERATIVE VISIT: Primary | ICD-10-CM

## 2024-06-03 PROCEDURE — 99024 POSTOP FOLLOW-UP VISIT: CPT | Performed by: SURGERY

## 2024-06-03 ASSESSMENT — PATIENT HEALTH QUESTIONNAIRE - PHQ9
1. LITTLE INTEREST OR PLEASURE IN DOING THINGS: NOT AT ALL
SUM OF ALL RESPONSES TO PHQ QUESTIONS 1-9: 0
2. FEELING DOWN, DEPRESSED OR HOPELESS: NOT AT ALL
SUM OF ALL RESPONSES TO PHQ9 QUESTIONS 1 & 2: 0
SUM OF ALL RESPONSES TO PHQ QUESTIONS 1-9: 0

## 2024-06-03 NOTE — PROGRESS NOTES
Identified pt with two pt identifiers (name and ). Reviewed chart in preparation for visit and have obtained necessary documentation.    Britta Quevedo is a 74 y.o. female  Chief Complaint   Patient presents with    Follow-up     Check incision and stoma      BP (!) 147/86 (Site: Left Upper Arm, Position: Sitting, Cuff Size: Small Adult)   Pulse 90   Temp 98.1 °F (36.7 °C) (Oral)   Resp 16   Ht 1.499 m (4' 11\")   Wt 72.8 kg (160 lb 8 oz)   SpO2 95%   BMI 32.42 kg/m²     1. Have you been to the ER, urgent care clinic since your last visit?  Hospitalized since your last visit?NO    2. Have you seen or consulted any other health care providers outside of the [facility] since your last visit?  Include any pap smears or colon screening. NO    Patient and provider made aware of elevated BP x2. Patient asymptomatic. Patient reminded to monitor BP, continue to take BP medications if prescribed, and follow up with PCP/Cardiologist.  Patient expressed understanding and agreement.

## 2024-06-08 NOTE — PROGRESS NOTES
General surgery history and Physical    Patient: Britta Quevedo  MRN: [id number]    YOB: 1950  Age: 74 y.o.  Sex: female     Chief Complaint:  Chief Complaint   Patient presents with    Follow-up     Check incision and stoma        History of Present Illness: Britta Quevedo is a 74 y.o. very pleasant gentleman follow-up postop colostomy placement.  Patient is having difficulty dealing with the colostomy.    Social History:  Social Connections: Not on file       Past Medical History:  Past Medical History:   Diagnosis Date    Arthritis     Chronic kidney disease     Depression     Endometriosis     Hyperlipemia      Surgical History:  Past Surgical History:   Procedure Laterality Date    BACK SURGERY      CHOLECYSTECTOMY      COLONOSCOPY      COLONOSCOPY N/A 4/22/2024    COLONOSCOPY performed by Julian Pratt MD at [facility] ENDOSCOPY    HYSTERECTOMY (CERVIX STATUS UNKNOWN)      LAPAROSCOPY N/A 4/22/2024    LAPAROSCOPY DIAGNOSTIC, PARTIAL SIGMOID COLECTOMY WITH COLOSTOMY CREATION performed by Berto Yen MD at [facility] MAIN OR    TONSILLECTOMY         Allergies:  Allergies   Allergen Reactions    Meperidine      Other reaction(s): Unknown (comments)    Sulfa Antibiotics      Other reaction(s): Unknown (comments)       Current Meds:  Current Outpatient Medications   Medication Sig Dispense Refill    rosuvastatin (CRESTOR) 20 MG tablet Take 1 tablet by mouth daily      lisinopril-hydroCHLOROthiazide (PRINZIDE;ZESTORETIC) 20-25 MG per tablet Take 1 tablet by mouth daily      escitalopram (LEXAPRO) 20 MG tablet Take 1 tablet by mouth daily       No current facility-administered medications for this visit.       Review of Systems:  I reviewed the rest of organ systems personally and they are negative.  Pertinent findings discussed in HPI.    Physical Examination    BP (!) 147/86 (Site: Left Upper Arm, Position: Sitting, Cuff Size: Small Adult)   Pulse 90   Temp 98.1 °F (36.7 °C) (Oral)   Resp 16   Ht 1.499 m (4'

## 2024-06-20 ENCOUNTER — OFFICE VISIT (OUTPATIENT)
Age: 74
End: 2024-06-20

## 2024-06-20 VITALS
BODY MASS INDEX: 32.05 KG/M2 | WEIGHT: 159 LBS | SYSTOLIC BLOOD PRESSURE: 131 MMHG | DIASTOLIC BLOOD PRESSURE: 75 MMHG | OXYGEN SATURATION: 94 % | HEIGHT: 59 IN | RESPIRATION RATE: 18 BRPM | HEART RATE: 84 BPM | TEMPERATURE: 98.2 F

## 2024-06-20 DIAGNOSIS — G89.18 POST-OP PAIN: Primary | ICD-10-CM

## 2024-06-20 PROCEDURE — 99024 POSTOP FOLLOW-UP VISIT: CPT | Performed by: SURGERY

## 2024-06-20 RX ORDER — TRAMADOL HYDROCHLORIDE 50 MG/1
50 TABLET ORAL EVERY 8 HOURS PRN
Qty: 42 TABLET | Refills: 0 | Status: SHIPPED | OUTPATIENT
Start: 2024-06-20 | End: 2024-07-04

## 2024-06-20 ASSESSMENT — PATIENT HEALTH QUESTIONNAIRE - PHQ9
SUM OF ALL RESPONSES TO PHQ QUESTIONS 1-9: 0
SUM OF ALL RESPONSES TO PHQ QUESTIONS 1-9: 0
1. LITTLE INTEREST OR PLEASURE IN DOING THINGS: NOT AT ALL
2. FEELING DOWN, DEPRESSED OR HOPELESS: NOT AT ALL
SUM OF ALL RESPONSES TO PHQ QUESTIONS 1-9: 0
SUM OF ALL RESPONSES TO PHQ9 QUESTIONS 1 & 2: 0
SUM OF ALL RESPONSES TO PHQ QUESTIONS 1-9: 0

## 2024-06-20 NOTE — PROGRESS NOTES
Identified pt with two pt identifiers (name and ). Reviewed chart in preparation for visit and have obtained necessary documentation.    Britta Quevedo is a 74 y.o. female  Chief Complaint   Patient presents with    Follow-up     Check colostomy site      /75 (Site: Right Upper Arm, Position: Sitting, Cuff Size: Large Adult)   Pulse 84   Temp 98.2 °F (36.8 °C) (Oral)   Resp 18   Ht 1.499 m (4' 11\")   Wt 72.1 kg (159 lb)   SpO2 94%   BMI 32.11 kg/m²     1. Have you been to the ER, urgent care clinic since your last visit?  Hospitalized since your last visit?NO    2. Have you seen or consulted any other health care providers outside of the [facility] since your last visit?  Include any pap smears or colon screening. NO

## 2024-06-27 PROBLEM — G89.18 POST-OP PAIN: Status: ACTIVE | Noted: 2024-06-27

## 2024-06-27 NOTE — PROGRESS NOTES
Very pleasant woman is here today postop follow-up for colostomy placement.  She is doing much better with the colostomy management.  Patient will follow-up in 3 months for scheduled for colonoscopy examination.  And will talk about colostomy reversal.  Tramadol was provided for pain.

## 2024-07-02 ENCOUNTER — TELEPHONE (OUTPATIENT)
Age: 74
End: 2024-07-02

## 2024-07-02 NOTE — TELEPHONE ENCOUNTER
I called and spoke with Britta Quevedo 2 patient identifiers used she states that she ate taco's last night and since then she started to cramp and has been going to the bathroom a lot. He stoma is red. I told her that I would stay away from spicy, fried, and foods with a lot of acid in them because it can cause the stoma to flare up. I told her to stay away from those foods, continue using the ointment  gave her around the stoma and give it a few days to calm down. If it gets worse give our office a call back.

## 2024-07-02 NOTE — TELEPHONE ENCOUNTER
Patient has bag and it has busted about an hour ago, and is very red around incision part and when going to bathroom she is having pain

## 2024-07-09 ENCOUNTER — TELEPHONE (OUTPATIENT)
Age: 74
End: 2024-07-09

## 2024-07-09 NOTE — TELEPHONE ENCOUNTER
Per Dr.Jun betancourt to use Metamucil. I called and spoke with Britta Quevedo and made her aware that she can use it.

## 2024-07-09 NOTE — TELEPHONE ENCOUNTER
I called and spoke with Britta Quevedo she states she hasn't been eating enough fiber and wants to know if she can drink Metamucil. I went over a high fiber diet with her and told her to make sure she is staying well hydrated to help her bowels move. Message sent to

## 2024-08-16 DIAGNOSIS — G89.18 POST-OP PAIN: ICD-10-CM

## 2024-08-20 ENCOUNTER — TELEPHONE (OUTPATIENT)
Age: 74
End: 2024-08-20

## 2024-08-20 NOTE — TELEPHONE ENCOUNTER
I called and spoke with Britta Quevedo 2 patient identifiers used. She states that her stoma has been red and painful the last few days. She states that she did eat some [facility] fried Chicken, Cereal, and [employer]. I told her to try and stay away from fried greasy foods for the next couple days. Also informed her that sometimes dairy can cause gas which could cause her bag to fill up and burst. She states it has busted a few times from gas. She states she has been using tylenol as needed but it is not helping. I told her that the diet, powder, measuring the stoma to make sure the bag is fitting properly. She asked if she could see  this week. I informed her that he is not in the office the rest of the week but I would send him a message. I also informed her that if she needs to be seen this week she would have to go to the ER. Patient also asked for more pain medication. I informed her that I would also send a message to .     Luther Serve sent to provider.

## 2024-08-20 NOTE — TELEPHONE ENCOUNTER
Patient called today regarding her stoma. She says that it is very red and sore to the left of the stoma. She says that it is painful for about 5 days now. Please advice and call pain medication into [facility]. [phone]

## 2024-08-21 DIAGNOSIS — M79.606 PAIN OF LOWER EXTREMITY, UNSPECIFIED LATERALITY: Primary | ICD-10-CM

## 2024-08-21 RX ORDER — TRAMADOL HYDROCHLORIDE 50 MG/1
50 TABLET ORAL EVERY 4 HOURS PRN
Qty: 42 TABLET | Refills: 0 | Status: SHIPPED | OUTPATIENT
Start: 2024-08-21 | End: 2024-08-28

## 2024-08-21 NOTE — TELEPHONE ENCOUNTER
I called and spoke with Britta Quevedo and made her aware that  did send her Tramadol to her pharmacy. She thanked me for calling her.

## 2024-08-24 RX ORDER — TRAMADOL HYDROCHLORIDE 50 MG/1
50 TABLET ORAL EVERY 8 HOURS PRN
Qty: 21 TABLET | Refills: 0 | Status: SHIPPED | OUTPATIENT
Start: 2024-08-24 | End: 2024-08-31

## 2024-08-26 DIAGNOSIS — M79.606 PAIN OF LOWER EXTREMITY, UNSPECIFIED LATERALITY: ICD-10-CM

## 2024-08-28 RX ORDER — TRAMADOL HYDROCHLORIDE 50 MG/1
50 TABLET ORAL EVERY 8 HOURS PRN
Qty: 21 TABLET | Refills: 0 | Status: SHIPPED | OUTPATIENT
Start: 2024-08-28 | End: 2024-09-04

## 2024-09-20 ENCOUNTER — OFFICE VISIT (OUTPATIENT)
Age: 74
End: 2024-09-20
Payer: MEDICARE

## 2024-09-20 VITALS
BODY MASS INDEX: 32.76 KG/M2 | RESPIRATION RATE: 16 BRPM | TEMPERATURE: 98.7 F | HEIGHT: 59 IN | WEIGHT: 162.5 LBS | OXYGEN SATURATION: 93 % | DIASTOLIC BLOOD PRESSURE: 78 MMHG | SYSTOLIC BLOOD PRESSURE: 135 MMHG | HEART RATE: 91 BPM

## 2024-09-20 DIAGNOSIS — Z93.3 COLOSTOMY STATUS (HCC): ICD-10-CM

## 2024-09-20 DIAGNOSIS — K43.9 VENTRAL HERNIA WITHOUT OBSTRUCTION OR GANGRENE: Primary | ICD-10-CM

## 2024-09-20 PROCEDURE — 99212 OFFICE O/P EST SF 10 MIN: CPT | Performed by: SURGERY

## 2024-09-20 PROCEDURE — 1123F ACP DISCUSS/DSCN MKR DOCD: CPT | Performed by: SURGERY

## 2024-09-20 RX ORDER — TOFACITINIB 11 MG/1
1 TABLET, FILM COATED, EXTENDED RELEASE ORAL DAILY
COMMUNITY
Start: 2024-08-27

## 2024-09-20 ASSESSMENT — PATIENT HEALTH QUESTIONNAIRE - PHQ9
SUM OF ALL RESPONSES TO PHQ QUESTIONS 1-9: 0
SUM OF ALL RESPONSES TO PHQ9 QUESTIONS 1 & 2: 0
1. LITTLE INTEREST OR PLEASURE IN DOING THINGS: NOT AT ALL
2. FEELING DOWN, DEPRESSED OR HOPELESS: NOT AT ALL
SUM OF ALL RESPONSES TO PHQ QUESTIONS 1-9: 0

## 2024-09-26 PROBLEM — K43.9 VENTRAL HERNIA WITHOUT OBSTRUCTION OR GANGRENE: Status: ACTIVE | Noted: 2024-09-26

## 2024-09-26 PROBLEM — Z93.3 COLOSTOMY STATUS (HCC): Status: ACTIVE | Noted: 2024-09-26

## 2024-09-30 ENCOUNTER — TELEPHONE (OUTPATIENT)
Age: 74
End: 2024-09-30

## 2024-09-30 ENCOUNTER — PREP FOR PROCEDURE (OUTPATIENT)
Age: 74
End: 2024-09-30

## 2024-09-30 DIAGNOSIS — K43.9 VENTRAL HERNIA WITHOUT OBSTRUCTION OR GANGRENE: Primary | ICD-10-CM

## 2024-09-30 NOTE — TELEPHONE ENCOUNTER
Attempted to contact patient to confirm surgery with Dr. Yen on 10/16 for colonoscopy and 10/30 for surgery. No answer, left message stating that I will send two surgical letters in the mail to her regarding each procedure.

## 2024-09-30 NOTE — TELEPHONE ENCOUNTER
I called and spoke with Britta Quevedo 2 patient identifiers used informed her that the CT Scan was ordered and she can call [phone] to schedule.

## 2024-09-30 NOTE — TELEPHONE ENCOUNTER
Spoke with  per his last note going to order CT ABD/PEL for Ventral Hernia. He informed me to put in order and give patient a call to let her know.

## 2024-10-09 ENCOUNTER — HOSPITAL ENCOUNTER (OUTPATIENT)
Facility: HOSPITAL | Age: 74
Discharge: HOME | End: 2024-10-12
Attending: SURGERY
Payer: MEDICARE

## 2024-10-09 DIAGNOSIS — K43.9 VENTRAL HERNIA WITHOUT OBSTRUCTION OR GANGRENE: ICD-10-CM

## 2024-10-09 LAB
BUN SERPL-MCNC: 34 MG/DL (ref 6–20)
CREAT SERPL-MCNC: 1.15 MG/DL (ref 0.55–1.02)

## 2024-10-09 PROCEDURE — 82565 ASSAY OF CREATININE: CPT

## 2024-10-09 PROCEDURE — 84520 ASSAY OF UREA NITROGEN: CPT

## 2024-10-09 PROCEDURE — 74177 CT ABD & PELVIS W/CONTRAST: CPT

## 2024-10-09 PROCEDURE — 6360000004 HC RX CONTRAST MEDICATION: Performed by: SURGERY

## 2024-10-09 PROCEDURE — 36415 COLL VENOUS BLD VENIPUNCTURE: CPT

## 2024-10-09 RX ORDER — IOPAMIDOL 755 MG/ML
100 INJECTION, SOLUTION INTRAVASCULAR
Status: COMPLETED | OUTPATIENT
Start: 2024-10-09 | End: 2024-10-09

## 2024-10-09 RX ADMIN — IOPAMIDOL 100 ML: 755 INJECTION, SOLUTION INTRAVENOUS at 11:55

## 2024-10-10 DIAGNOSIS — G89.18 POST-OP PAIN: ICD-10-CM

## 2024-10-16 ENCOUNTER — ANESTHESIA (OUTPATIENT)
Facility: HOSPITAL | Age: 74
End: 2024-10-16
Payer: MEDICARE

## 2024-10-16 ENCOUNTER — ANESTHESIA EVENT (OUTPATIENT)
Facility: HOSPITAL | Age: 74
End: 2024-10-16
Payer: MEDICARE

## 2024-10-16 ENCOUNTER — HOSPITAL ENCOUNTER (OUTPATIENT)
Facility: HOSPITAL | Age: 74
Discharge: HOME | End: 2024-10-16
Attending: SURGERY | Admitting: SURGERY
Payer: MEDICARE

## 2024-10-16 VITALS
DIASTOLIC BLOOD PRESSURE: 60 MMHG | SYSTOLIC BLOOD PRESSURE: 92 MMHG | WEIGHT: 162 LBS | BODY MASS INDEX: 32.66 KG/M2 | TEMPERATURE: 98 F | OXYGEN SATURATION: 97 % | RESPIRATION RATE: 18 BRPM | HEART RATE: 78 BPM | HEIGHT: 59 IN

## 2024-10-16 DIAGNOSIS — Z93.3 COLOSTOMY STATUS (HCC): ICD-10-CM

## 2024-10-16 PROCEDURE — 99222 1ST HOSP IP/OBS MODERATE 55: CPT | Performed by: SURGERY

## 2024-10-16 PROCEDURE — 2709999900 HC NON-CHARGEABLE SUPPLY: Performed by: SURGERY

## 2024-10-16 PROCEDURE — 45380 COLONOSCOPY AND BIOPSY: CPT | Performed by: SURGERY

## 2024-10-16 PROCEDURE — 7100000010 HC PHASE II RECOVERY - FIRST 15 MIN: Performed by: SURGERY

## 2024-10-16 PROCEDURE — 6360000002 HC RX W HCPCS: Performed by: NURSE ANESTHETIST, CERTIFIED REGISTERED

## 2024-10-16 PROCEDURE — 88305 TISSUE EXAM BY PATHOLOGIST: CPT

## 2024-10-16 PROCEDURE — 2500000003 HC RX 250 WO HCPCS: Performed by: NURSE ANESTHETIST, CERTIFIED REGISTERED

## 2024-10-16 PROCEDURE — 3600007503: Performed by: SURGERY

## 2024-10-16 PROCEDURE — 7100000011 HC PHASE II RECOVERY - ADDTL 15 MIN: Performed by: SURGERY

## 2024-10-16 PROCEDURE — 3600007513: Performed by: SURGERY

## 2024-10-16 PROCEDURE — 3700000000 HC ANESTHESIA ATTENDED CARE: Performed by: SURGERY

## 2024-10-16 PROCEDURE — 3700000001 HC ADD 15 MINUTES (ANESTHESIA): Performed by: SURGERY

## 2024-10-16 RX ORDER — SODIUM CHLORIDE, SODIUM LACTATE, POTASSIUM CHLORIDE, CALCIUM CHLORIDE 600; 310; 30; 20 MG/100ML; MG/100ML; MG/100ML; MG/100ML
INJECTION, SOLUTION INTRAVENOUS CONTINUOUS
Status: DISCONTINUED | OUTPATIENT
Start: 2024-10-16 | End: 2024-10-16 | Stop reason: HOSPADM

## 2024-10-16 RX ORDER — SODIUM CHLORIDE 9 MG/ML
INJECTION, SOLUTION INTRAVENOUS PRN
Status: DISCONTINUED | OUTPATIENT
Start: 2024-10-16 | End: 2024-10-16 | Stop reason: HOSPADM

## 2024-10-16 RX ORDER — LIDOCAINE HYDROCHLORIDE 20 MG/ML
INJECTION, SOLUTION EPIDURAL; INFILTRATION; INTRACAUDAL; PERINEURAL
Status: DISCONTINUED | OUTPATIENT
Start: 2024-10-16 | End: 2024-10-16 | Stop reason: SDUPTHER

## 2024-10-16 RX ORDER — SODIUM CHLORIDE 0.9 % (FLUSH) 0.9 %
5-40 SYRINGE (ML) INJECTION EVERY 12 HOURS SCHEDULED
Status: DISCONTINUED | OUTPATIENT
Start: 2024-10-16 | End: 2024-10-16 | Stop reason: HOSPADM

## 2024-10-16 RX ORDER — PHENYLEPHRINE HCL IN 0.9% NACL 0.4MG/10ML
SYRINGE (ML) INTRAVENOUS
Status: DISCONTINUED | OUTPATIENT
Start: 2024-10-16 | End: 2024-10-16 | Stop reason: SDUPTHER

## 2024-10-16 RX ORDER — SODIUM CHLORIDE 9 MG/ML
INJECTION, SOLUTION INTRAVENOUS CONTINUOUS
Status: DISCONTINUED | OUTPATIENT
Start: 2024-10-16 | End: 2024-10-16 | Stop reason: HOSPADM

## 2024-10-16 RX ORDER — SODIUM CHLORIDE 0.9 % (FLUSH) 0.9 %
5-40 SYRINGE (ML) INJECTION PRN
Status: DISCONTINUED | OUTPATIENT
Start: 2024-10-16 | End: 2024-10-16 | Stop reason: HOSPADM

## 2024-10-16 RX ORDER — TRAMADOL HYDROCHLORIDE 50 MG/1
50 TABLET ORAL EVERY 6 HOURS PRN
COMMUNITY

## 2024-10-16 RX ADMIN — PROPOFOL 50 MG: 10 INJECTION, EMULSION INTRAVENOUS at 07:44

## 2024-10-16 RX ADMIN — Medication 200 MCG: at 07:38

## 2024-10-16 RX ADMIN — Medication 200 MCG: at 07:51

## 2024-10-16 RX ADMIN — Medication 200 MCG: at 07:48

## 2024-10-16 RX ADMIN — Medication 200 MCG: at 07:49

## 2024-10-16 RX ADMIN — Medication 200 MCG: at 08:04

## 2024-10-16 RX ADMIN — PROPOFOL 50 MG: 10 INJECTION, EMULSION INTRAVENOUS at 07:48

## 2024-10-16 RX ADMIN — PROPOFOL 100 MG: 10 INJECTION, EMULSION INTRAVENOUS at 07:38

## 2024-10-16 RX ADMIN — Medication 200 MCG: at 07:42

## 2024-10-16 RX ADMIN — LIDOCAINE HYDROCHLORIDE 100 MG: 20 SOLUTION INTRAVENOUS at 07:38

## 2024-10-16 RX ADMIN — PROPOFOL 20 MG: 10 INJECTION, EMULSION INTRAVENOUS at 07:54

## 2024-10-16 ASSESSMENT — PAIN - FUNCTIONAL ASSESSMENT
PAIN_FUNCTIONAL_ASSESSMENT: 0-10
PAIN_FUNCTIONAL_ASSESSMENT: NONE - DENIES PAIN
PAIN_FUNCTIONAL_ASSESSMENT: NONE - DENIES PAIN

## 2024-10-16 NOTE — ANESTHESIA PRE PROCEDURE
06:09 AM       CMP:   Lab Results   Component Value Date/Time     05/19/2024 06:09 AM    K 4.0 05/19/2024 06:09 AM     05/19/2024 06:09 AM    CO2 25 05/19/2024 06:09 AM    BUN 34 10/09/2024 11:04 AM    CREATININE 1.15 10/09/2024 11:04 AM    LABGLOM 50 10/09/2024 11:04 AM    LABGLOM >90 04/30/2024 07:27 AM    GLUCOSE 108 05/19/2024 06:09 AM    CALCIUM 9.4 05/19/2024 06:09 AM    BILITOT 0.5 05/16/2024 10:07 PM    ALKPHOS 38 05/16/2024 10:07 PM    AST 22 05/16/2024 10:07 PM    ALT 20 05/16/2024 10:07 PM       POC Tests: No results for input(s): \"POCGLU\", \"POCNA\", \"POCK\", \"POCCL\", \"POCBUN\", \"POCHEMO\", \"POCHCT\" in the last 72 hours.    Coags: No results found for: \"PROTIME\", \"INR\", \"APTT\"    HCG (If Applicable): No results found for: \"PREGTESTUR\", \"PREGSERUM\", \"HCG\", \"HCGQUANT\"     ABGs: No results found for: \"PHART\", \"PO2ART\", \"ZLM8CAL\", \"ZZI4ERV\", \"BEART\", \"Q8URYXVF\"     Type & Screen (If Applicable):  No results found for: \"LABABO\"    Drug/Infectious Status (If Applicable):  No results found for: \"HIV\", \"HEPCAB\"    COVID-19 Screening (If Applicable):   Lab Results   Component Value Date/Time    COVID19 Please find results under separate order 10/17/2021 08:30 AM    COVID19 Not detected 10/17/2021 08:30 AM           Anesthesia Evaluation  Patient summary reviewed and Nursing notes reviewed   history of anesthetic complications: PONV and postop delirium.  Airway: Mallampati: III  TM distance: >3 FB   Neck ROM: full     Dental:          Pulmonary:Negative Pulmonary ROS and normal exam                               Cardiovascular:    (+) hypertension:        Rhythm: regular  Rate: normal                    Neuro/Psych:   (+) psychiatric history:depression/anxiety             GI/Hepatic/Renal: Neg GI/Hepatic/Renal ROS           ROS comment: Perforated bowel after colonoscopy..   Endo/Other: Negative Endo/Other ROS                    Abdominal:             Vascular: negative vascular ROS.         Other Findings:

## 2024-10-16 NOTE — H&P
General surgery history and Physical     Patient: Britta Quevedo                    MRN: [id number]          YOB: 1950          Age: 74 y.o.     Sex: female      Chief Complaint:       Chief Complaint   Patient presents with    Follow-up       Colostomy Check          History of Present Illness: Britta Quevedo is a 74 y.o. very pleasant woman is here today to discuss preop evaluation.  Patient had perforated bowel during the colonoscopy examination requiring sigmoid colon resection and colostomy placement.  Patient also complaining of the right flank pain today.  Patient apparently had spine surgery during the anterior and posterior approach, right flank incision.  Now she noted swelling and pain localized right side abdomen.  Colostomy is working okay     Social History:  Social Connections: Not on file         Past Medical History:  Past Medical History        Past Medical History:   Diagnosis Date    Arthritis      Chronic kidney disease      Depression      Endometriosis      Hyperlipemia           Surgical History:  Past Surgical History         Past Surgical History:   Procedure Laterality Date    BACK SURGERY        CHOLECYSTECTOMY        COLONOSCOPY        COLONOSCOPY N/A 4/22/2024     COLONOSCOPY performed by Julian Pratt MD at [facility] ENDOSCOPY    HYSTERECTOMY (CERVIX STATUS UNKNOWN)        LAPAROSCOPY N/A 4/22/2024     LAPAROSCOPY DIAGNOSTIC, PARTIAL SIGMOID COLECTOMY WITH COLOSTOMY CREATION performed by Berto Yen MD at [facility] MAIN OR    TONSILLECTOMY                Allergies:  Allergies         Allergies   Allergen Reactions    Meperidine         Other reaction(s): Unknown (comments)    Sulfa Antibiotics         Other reaction(s): Unknown (comments)            Current Meds:  Current Facility-Administered Medications          Current Outpatient Medications   Medication Sig Dispense Refill    XELJANZ XR 11 MG TB24 Take 1 tablet by mouth daily        rosuvastatin (CRESTOR) 20 MG tablet Take 1

## 2024-10-16 NOTE — ANESTHESIA POSTPROCEDURE EVALUATION
Department of Anesthesiology  Postprocedure Note    Patient: Britta Quevedo  MRN: [id number]  YOB: 1950  Date of evaluation: 10/16/2024    Procedure Summary       Date: 10/16/24 Room / Location: [facility] ENDO 03 / [facility] ENDOSCOPY    Anesthesia Start: 0734 Anesthesia Stop: 0806    Procedures:       DIAGNOSTIC COLONOSCOPY (Lower GI Region)      COLONOSCOPY BIOPSY (Lower GI Region) Diagnosis:       Colostomy status (HCC)      (Colostomy status (HCC) [Z93.3])    Surgeons: Berto Yen MD Responsible Provider: Justyn Christopher MD    Anesthesia Type: MAC, TIVA ASA Status: 3            Anesthesia Type: No value filed.    Vivi Phase I: Vivi Score: 10    Vivi Phase II:      Anesthesia Post Evaluation    Patient location during evaluation: bedside  Patient participation: complete - patient participated  Level of consciousness: sleepy but conscious  Pain score: 0  Airway patency: patent  Nausea & Vomiting: no vomiting and no nausea  Cardiovascular status: blood pressure returned to baseline  Respiratory status: acceptable  Hydration status: stable  Pain management: adequate    No notable events documented.

## 2024-10-16 NOTE — PERIOP NOTE
0812: Pt to \[facility]\"" for recovery. Pt stable and alert. VS taken. Pt tolerating juice and crackers. Pt daughter at bedside.  0830: Discharge instructions and medications reviewed/given. Patient and her daughter verbalizes understanding. All questions answered. No distress noted, patient stable.   0837: Pt discharged via wheelchair to private vehicle with daughter.

## 2024-10-17 NOTE — PROCEDURES
PROCEDURE NOTE  Date: 10/17/2024   Name: Britta Quevedo  YOB: 1950    Procedures    Colonoscopy with a biopsy.      Indication: Colostomy status.      Procedure: After informed consent was obtained patient was placed monitored anesthetic care.  And appropriate position.  Olympus endoscope was introduced after distal rectal exam.  Olympus scope introduced to the proximal rectum without difficulty, patient was noted to have internal hemorrhoid.  Mucus ball was noted.  Followed by on post scope was removed and the Olympus scope was introduced through the left lower quadrant colostomy site.  And the scope was advanced to the cecum without difficulty.  Bowel prep was excellent.  Patient had moderate diverticulosis noted right ascending colon.  And a small polyp noted proximal transverse colon this was biopsied.  Rest of the intestinal tract was clean.  Descending colon also had moderate diverticulosis noted.  Followed by Olympus scope was removed without difficulty.  Patient tolerated procedure well.

## 2024-10-24 ENCOUNTER — HOSPITAL ENCOUNTER (OUTPATIENT)
Facility: HOSPITAL | Age: 74
Discharge: HOME | End: 2024-10-27
Payer: MEDICARE

## 2024-10-24 VITALS
SYSTOLIC BLOOD PRESSURE: 115 MMHG | OXYGEN SATURATION: 95 % | TEMPERATURE: 98.2 F | HEIGHT: 60 IN | BODY MASS INDEX: 31.8 KG/M2 | HEART RATE: 98 BPM | WEIGHT: 162 LBS | DIASTOLIC BLOOD PRESSURE: 69 MMHG | RESPIRATION RATE: 20 BRPM

## 2024-10-24 LAB
ALBUMIN SERPL-MCNC: 3.7 G/DL (ref 3.5–5)
ALBUMIN/GLOB SERPL: 1 (ref 1.1–2.2)
ALP SERPL-CCNC: 62 U/L (ref 45–117)
ALT SERPL-CCNC: 34 U/L (ref 12–78)
ANION GAP SERPL CALC-SCNC: 7 MMOL/L (ref 2–12)
APTT PPP: 27 SEC (ref 21.2–34.1)
AST SERPL W P-5'-P-CCNC: 27 U/L (ref 15–37)
BILIRUB SERPL-MCNC: 0.5 MG/DL (ref 0.2–1)
BUN SERPL-MCNC: 24 MG/DL (ref 6–20)
BUN/CREAT SERPL: 21 (ref 12–20)
CA-I BLD-MCNC: 9.9 MG/DL (ref 8.5–10.1)
CHLORIDE SERPL-SCNC: 108 MMOL/L (ref 97–108)
CO2 SERPL-SCNC: 23 MMOL/L (ref 21–32)
CREAT SERPL-MCNC: 1.13 MG/DL (ref 0.55–1.02)
ERYTHROCYTE [DISTWIDTH] IN BLOOD BY AUTOMATED COUNT: 14.3 % (ref 11.5–14.5)
EST. AVERAGE GLUCOSE BLD GHB EST-MCNC: 126 MG/DL
GLOBULIN SER CALC-MCNC: 3.6 G/DL (ref 2–4)
GLUCOSE SERPL-MCNC: 175 MG/DL (ref 65–100)
HBA1C MFR BLD: 6 % (ref 4–5.6)
HCT VFR BLD AUTO: 37.4 % (ref 35–47)
HGB BLD-MCNC: 12.9 G/DL (ref 11.5–16)
INR PPP: 0.9 (ref 0.9–1.1)
MCH RBC QN AUTO: 29.9 PG (ref 26–34)
MCHC RBC AUTO-ENTMCNC: 34.5 G/DL (ref 30–36.5)
MCV RBC AUTO: 86.8 FL (ref 80–99)
MRSA DNA SPEC QL NAA+PROBE: NOT DETECTED
NRBC # BLD: 0 K/UL (ref 0–0.01)
NRBC BLD-RTO: 0 PER 100 WBC
PLATELET # BLD AUTO: 267 K/UL (ref 150–400)
PMV BLD AUTO: 10.1 FL (ref 8.9–12.9)
POTASSIUM SERPL-SCNC: 3.6 MMOL/L (ref 3.5–5.1)
PROT SERPL-MCNC: 7.3 G/DL (ref 6.4–8.2)
PROTHROMBIN TIME: 12.5 SEC (ref 11.9–14.6)
RBC # BLD AUTO: 4.31 M/UL (ref 3.8–5.2)
SODIUM SERPL-SCNC: 138 MMOL/L (ref 136–145)
THERAPEUTIC RANGE: NORMAL SEC (ref 82–109)
WBC # BLD AUTO: 6 K/UL (ref 3.6–11)

## 2024-10-24 PROCEDURE — 80053 COMPREHEN METABOLIC PANEL: CPT

## 2024-10-24 PROCEDURE — 83036 HEMOGLOBIN GLYCOSYLATED A1C: CPT

## 2024-10-24 PROCEDURE — 93005 ELECTROCARDIOGRAM TRACING: CPT | Performed by: SURGERY

## 2024-10-24 PROCEDURE — 85610 PROTHROMBIN TIME: CPT

## 2024-10-24 PROCEDURE — 87641 MR-STAPH DNA AMP PROBE: CPT

## 2024-10-24 PROCEDURE — 36415 COLL VENOUS BLD VENIPUNCTURE: CPT

## 2024-10-24 PROCEDURE — 85027 COMPLETE CBC AUTOMATED: CPT

## 2024-10-24 PROCEDURE — 85730 THROMBOPLASTIN TIME PARTIAL: CPT

## 2024-10-25 ENCOUNTER — TELEPHONE (OUTPATIENT)
Age: 74
End: 2024-10-25

## 2024-10-25 LAB
EKG ATRIAL RATE: 94 BPM
EKG DIAGNOSIS: NORMAL
EKG P AXIS: 21 DEGREES
EKG P-R INTERVAL: 170 MS
EKG Q-T INTERVAL: 400 MS
EKG QRS DURATION: 74 MS
EKG QTC CALCULATION (BAZETT): 500 MS
EKG R AXIS: -12 DEGREES
EKG T AXIS: 61 DEGREES
EKG VENTRICULAR RATE: 94 BPM

## 2024-10-25 NOTE — PERIOP NOTE
Spoke to Dr. Yen and his office to remind about bowel prep and instructions.  States she has office appointment Monday and will give at that appointment.

## 2024-10-28 ENCOUNTER — OFFICE VISIT (OUTPATIENT)
Age: 74
End: 2024-10-28

## 2024-10-28 VITALS
SYSTOLIC BLOOD PRESSURE: 137 MMHG | TEMPERATURE: 97.5 F | HEIGHT: 59 IN | DIASTOLIC BLOOD PRESSURE: 64 MMHG | WEIGHT: 162.5 LBS | BODY MASS INDEX: 32.76 KG/M2 | RESPIRATION RATE: 18 BRPM | OXYGEN SATURATION: 94 % | HEART RATE: 98 BPM

## 2024-10-28 DIAGNOSIS — Z93.3 COLOSTOMY STATUS (HCC): Primary | ICD-10-CM

## 2024-10-28 ASSESSMENT — PATIENT HEALTH QUESTIONNAIRE - PHQ9
SUM OF ALL RESPONSES TO PHQ QUESTIONS 1-9: 0
2. FEELING DOWN, DEPRESSED OR HOPELESS: NOT AT ALL
SUM OF ALL RESPONSES TO PHQ QUESTIONS 1-9: 0
1. LITTLE INTEREST OR PLEASURE IN DOING THINGS: NOT AT ALL
SUM OF ALL RESPONSES TO PHQ9 QUESTIONS 1 & 2: 0

## 2024-10-29 ENCOUNTER — ANESTHESIA EVENT (OUTPATIENT)
Facility: HOSPITAL | Age: 74
DRG: 329 | End: 2024-10-29
Payer: MEDICARE

## 2024-10-30 ENCOUNTER — HOSPITAL ENCOUNTER (INPATIENT)
Facility: HOSPITAL | Age: 74
LOS: 7 days | Discharge: HOME HEALTH | DRG: 329 | End: 2024-11-06
Attending: SURGERY | Admitting: SURGERY
Payer: MEDICARE

## 2024-10-30 ENCOUNTER — ANESTHESIA (OUTPATIENT)
Facility: HOSPITAL | Age: 74
DRG: 329 | End: 2024-10-30
Payer: MEDICARE

## 2024-10-30 ENCOUNTER — APPOINTMENT (OUTPATIENT)
Facility: HOSPITAL | Age: 74
DRG: 329 | End: 2024-10-30
Attending: SURGERY
Payer: MEDICARE

## 2024-10-30 DIAGNOSIS — K43.9 VENTRAL HERNIA WITHOUT OBSTRUCTION OR GANGRENE: ICD-10-CM

## 2024-10-30 DIAGNOSIS — Z93.3 COLOSTOMY STATUS (HCC): ICD-10-CM

## 2024-10-30 DIAGNOSIS — G89.18 POST-OP PAIN: Primary | ICD-10-CM

## 2024-10-30 LAB
ALBUMIN SERPL-MCNC: 4.2 G/DL (ref 3.5–5)
ALBUMIN/GLOB SERPL: 1.8 (ref 1.1–2.2)
ALP SERPL-CCNC: 42 U/L (ref 45–117)
ALT SERPL-CCNC: 31 U/L (ref 12–78)
ANION GAP BLD CALC-SCNC: 12
ANION GAP SERPL CALC-SCNC: 9 MMOL/L (ref 2–12)
AST SERPL W P-5'-P-CCNC: 28 U/L (ref 15–37)
BASE DEFICIT BLDA-SCNC: 5.9 MMOL/L
BASOPHILS # BLD: 0 K/UL (ref 0–0.1)
BASOPHILS NFR BLD: 0 % (ref 0–1)
BILIRUB SERPL-MCNC: 0.7 MG/DL (ref 0.2–1)
BUN SERPL-MCNC: 17 MG/DL (ref 6–20)
BUN/CREAT SERPL: 18 (ref 12–20)
CA-I BLD-MCNC: 1.27 MMOL/L (ref 1.12–1.32)
CA-I BLD-MCNC: 9 MG/DL (ref 8.5–10.1)
CHLORIDE BLD-SCNC: 107 MMOL/L (ref 98–107)
CHLORIDE SERPL-SCNC: 113 MMOL/L (ref 97–108)
CO2 BLD-SCNC: 26 MMOL/L
CO2 SERPL-SCNC: 21 MMOL/L (ref 21–32)
CREAT SERPL-MCNC: 0.95 MG/DL (ref 0.55–1.02)
CREAT UR-MCNC: 0.98 MG/DL (ref 0.6–1.3)
DIFFERENTIAL METHOD BLD: ABNORMAL
EOSINOPHIL # BLD: 0 K/UL (ref 0–0.4)
EOSINOPHIL NFR BLD: 0 % (ref 0–7)
ERYTHROCYTE [DISTWIDTH] IN BLOOD BY AUTOMATED COUNT: 14.6 % (ref 11.5–14.5)
GAS FLOW.O2 O2 DELIVERY SYS: 4 L/MIN
GLOBULIN SER CALC-MCNC: 2.4 G/DL (ref 2–4)
GLUCOSE BLD STRIP.AUTO-MCNC: 105 MG/DL (ref 65–100)
GLUCOSE SERPL-MCNC: 182 MG/DL (ref 65–100)
HCO3 BLDA-SCNC: 19 MMOL/L (ref 22–26)
HCT VFR BLD AUTO: 31.9 % (ref 35–47)
HGB BLD-MCNC: 10.7 G/DL (ref 11.5–16)
IMM GRANULOCYTES # BLD AUTO: 0 K/UL (ref 0–0.04)
IMM GRANULOCYTES NFR BLD AUTO: 0 % (ref 0–0.5)
LYMPHOCYTES # BLD: 0.6 K/UL (ref 0.8–3.5)
LYMPHOCYTES NFR BLD: 9 % (ref 12–49)
MCH RBC QN AUTO: 29.5 PG (ref 26–34)
MCHC RBC AUTO-ENTMCNC: 33.5 G/DL (ref 30–36.5)
MCV RBC AUTO: 87.9 FL (ref 80–99)
MONOCYTES # BLD: 0.4 K/UL (ref 0–1)
MONOCYTES NFR BLD: 6 % (ref 5–13)
MRSA DNA SPEC QL NAA+PROBE: NOT DETECTED
NEUTS SEG # BLD: 5.8 K/UL (ref 1.8–8)
NEUTS SEG NFR BLD: 85 % (ref 32–75)
NRBC # BLD: 0 K/UL (ref 0–0.01)
NRBC BLD-RTO: 0 PER 100 WBC
PCO2 BLDA: 37 MMHG (ref 35–45)
PH BLDA: 7.34 (ref 7.35–7.45)
PLATELET # BLD AUTO: 252 K/UL (ref 150–400)
PMV BLD AUTO: 9.8 FL (ref 8.9–12.9)
PO2 BLDA: 130 MMHG (ref 80–100)
POTASSIUM BLD-SCNC: 3.7 MMOL/L (ref 3.5–5.5)
POTASSIUM SERPL-SCNC: 3.4 MMOL/L (ref 3.5–5.1)
PROT SERPL-MCNC: 6.6 G/DL (ref 6.4–8.2)
RBC # BLD AUTO: 3.63 M/UL (ref 3.8–5.2)
SAO2 % BLD: 98 % (ref 95–99)
SAO2% DEVICE SAO2% SENSOR NAME: ABNORMAL
SODIUM BLD-SCNC: 144 MMOL/L (ref 136–145)
SODIUM SERPL-SCNC: 143 MMOL/L (ref 136–145)
WBC # BLD AUTO: 6.8 K/UL (ref 3.6–11)

## 2024-10-30 PROCEDURE — 94640 AIRWAY INHALATION TREATMENT: CPT

## 2024-10-30 PROCEDURE — 80047 BASIC METABLC PNL IONIZED CA: CPT

## 2024-10-30 PROCEDURE — 6370000000 HC RX 637 (ALT 250 FOR IP): Performed by: ANESTHESIOLOGY

## 2024-10-30 PROCEDURE — 6360000002 HC RX W HCPCS: Performed by: SURGERY

## 2024-10-30 PROCEDURE — 2709999900 HC NON-CHARGEABLE SUPPLY: Performed by: SURGERY

## 2024-10-30 PROCEDURE — C1781 MESH (IMPLANTABLE): HCPCS | Performed by: SURGERY

## 2024-10-30 PROCEDURE — 2700000000 HC OXYGEN THERAPY PER DAY

## 2024-10-30 PROCEDURE — 0DTJ0ZZ RESECTION OF APPENDIX, OPEN APPROACH: ICD-10-PCS | Performed by: SURGERY

## 2024-10-30 PROCEDURE — 0DBN0ZZ EXCISION OF SIGMOID COLON, OPEN APPROACH: ICD-10-PCS | Performed by: SURGERY

## 2024-10-30 PROCEDURE — 2500000003 HC RX 250 WO HCPCS: Performed by: SURGERY

## 2024-10-30 PROCEDURE — 2580000003 HC RX 258: Performed by: SURGERY

## 2024-10-30 PROCEDURE — 6360000002 HC RX W HCPCS

## 2024-10-30 PROCEDURE — 3700000001 HC ADD 15 MINUTES (ANESTHESIA): Performed by: SURGERY

## 2024-10-30 PROCEDURE — 71045 X-RAY EXAM CHEST 1 VIEW: CPT

## 2024-10-30 PROCEDURE — 80053 COMPREHEN METABOLIC PANEL: CPT

## 2024-10-30 PROCEDURE — 3700000000 HC ANESTHESIA ATTENDED CARE: Performed by: SURGERY

## 2024-10-30 PROCEDURE — 7100000001 HC PACU RECOVERY - ADDTL 15 MIN: Performed by: SURGERY

## 2024-10-30 PROCEDURE — 2500000003 HC RX 250 WO HCPCS

## 2024-10-30 PROCEDURE — 2720000010 HC SURG SUPPLY STERILE: Performed by: SURGERY

## 2024-10-30 PROCEDURE — P9045 ALBUMIN (HUMAN), 5%, 250 ML: HCPCS

## 2024-10-30 PROCEDURE — 99222 1ST HOSP IP/OBS MODERATE 55: CPT | Performed by: SURGERY

## 2024-10-30 PROCEDURE — 0WUF0JZ SUPPLEMENT ABDOMINAL WALL WITH SYNTHETIC SUBSTITUTE, OPEN APPROACH: ICD-10-PCS | Performed by: SURGERY

## 2024-10-30 PROCEDURE — 88304 TISSUE EXAM BY PATHOLOGIST: CPT

## 2024-10-30 PROCEDURE — 3E0436Z INTRODUCTION OF NUTRITIONAL SUBSTANCE INTO CENTRAL VEIN, PERCUTANEOUS APPROACH: ICD-10-PCS | Performed by: SURGERY

## 2024-10-30 PROCEDURE — 3600000004 HC SURGERY LEVEL 4 BASE: Performed by: SURGERY

## 2024-10-30 PROCEDURE — 87641 MR-STAPH DNA AMP PROBE: CPT

## 2024-10-30 PROCEDURE — 85025 COMPLETE CBC W/AUTO DIFF WBC: CPT

## 2024-10-30 PROCEDURE — 2580000003 HC RX 258

## 2024-10-30 PROCEDURE — 2000000000 HC ICU R&B

## 2024-10-30 PROCEDURE — 82803 BLOOD GASES ANY COMBINATION: CPT

## 2024-10-30 PROCEDURE — 7100000000 HC PACU RECOVERY - FIRST 15 MIN: Performed by: SURGERY

## 2024-10-30 PROCEDURE — 87086 URINE CULTURE/COLONY COUNT: CPT

## 2024-10-30 PROCEDURE — 6370000000 HC RX 637 (ALT 250 FOR IP): Performed by: SURGERY

## 2024-10-30 PROCEDURE — 3600000014 HC SURGERY LEVEL 4 ADDTL 15MIN: Performed by: SURGERY

## 2024-10-30 PROCEDURE — 94761 N-INVAS EAR/PLS OXIMETRY MLT: CPT

## 2024-10-30 PROCEDURE — 88307 TISSUE EXAM BY PATHOLOGIST: CPT

## 2024-10-30 DEVICE — VENTRALIGHT ST MESH, 4" X 6" (10.2 CM X 15.2 CM), ELLIPSE
Type: IMPLANTABLE DEVICE | Site: ABDOMEN | Status: FUNCTIONAL
Brand: VENTRALIGHT

## 2024-10-30 RX ORDER — DIPHENHYDRAMINE HYDROCHLORIDE 50 MG/ML
12.5 INJECTION INTRAMUSCULAR; INTRAVENOUS
Status: DISCONTINUED | OUTPATIENT
Start: 2024-10-30 | End: 2024-10-30 | Stop reason: HOSPADM

## 2024-10-30 RX ORDER — SCOLOPAMINE TRANSDERMAL SYSTEM 1 MG/1
1 PATCH, EXTENDED RELEASE TRANSDERMAL
Status: COMPLETED | OUTPATIENT
Start: 2024-10-30 | End: 2024-11-02

## 2024-10-30 RX ORDER — SODIUM CHLORIDE 9 MG/ML
INJECTION, SOLUTION INTRAVENOUS CONTINUOUS
Status: DISCONTINUED | OUTPATIENT
Start: 2024-10-30 | End: 2024-10-31

## 2024-10-30 RX ORDER — SODIUM CHLORIDE, SODIUM LACTATE, POTASSIUM CHLORIDE, CALCIUM CHLORIDE 600; 310; 30; 20 MG/100ML; MG/100ML; MG/100ML; MG/100ML
INJECTION, SOLUTION INTRAVENOUS CONTINUOUS
Status: DISCONTINUED | OUTPATIENT
Start: 2024-10-30 | End: 2024-10-30 | Stop reason: HOSPADM

## 2024-10-30 RX ORDER — ALBUMIN, HUMAN INJ 5% 5 %
SOLUTION INTRAVENOUS
Status: DISCONTINUED | OUTPATIENT
Start: 2024-10-30 | End: 2024-10-30 | Stop reason: SDUPTHER

## 2024-10-30 RX ORDER — SODIUM CHLORIDE 0.9 % (FLUSH) 0.9 %
5-40 SYRINGE (ML) INJECTION PRN
Status: DISCONTINUED | OUTPATIENT
Start: 2024-10-30 | End: 2024-10-30 | Stop reason: HOSPADM

## 2024-10-30 RX ORDER — GLUCAGON 1 MG/ML
1 KIT INJECTION PRN
Status: DISCONTINUED | OUTPATIENT
Start: 2024-10-30 | End: 2024-11-06 | Stop reason: HOSPADM

## 2024-10-30 RX ORDER — HYDROMORPHONE HYDROCHLORIDE 1 MG/ML
1 INJECTION, SOLUTION INTRAMUSCULAR; INTRAVENOUS; SUBCUTANEOUS EVERY 4 HOURS PRN
Status: DISCONTINUED | OUTPATIENT
Start: 2024-10-30 | End: 2024-11-01

## 2024-10-30 RX ORDER — DEXAMETHASONE SODIUM PHOSPHATE 4 MG/ML
INJECTION, SOLUTION INTRA-ARTICULAR; INTRALESIONAL; INTRAMUSCULAR; INTRAVENOUS; SOFT TISSUE
Status: DISCONTINUED | OUTPATIENT
Start: 2024-10-30 | End: 2024-10-30 | Stop reason: SDUPTHER

## 2024-10-30 RX ORDER — LORAZEPAM 2 MG/ML
0.5 INJECTION INTRAMUSCULAR
Status: DISCONTINUED | OUTPATIENT
Start: 2024-10-30 | End: 2024-10-30 | Stop reason: HOSPADM

## 2024-10-30 RX ORDER — ONDANSETRON 2 MG/ML
4 INJECTION INTRAMUSCULAR; INTRAVENOUS EVERY 6 HOURS PRN
Status: DISCONTINUED | OUTPATIENT
Start: 2024-10-30 | End: 2024-11-06 | Stop reason: HOSPADM

## 2024-10-30 RX ORDER — SODIUM CHLORIDE 0.9 % (FLUSH) 0.9 %
5-40 SYRINGE (ML) INJECTION EVERY 12 HOURS SCHEDULED
Status: DISCONTINUED | OUTPATIENT
Start: 2024-10-30 | End: 2024-10-30 | Stop reason: HOSPADM

## 2024-10-30 RX ORDER — DEXTROSE MONOHYDRATE, SODIUM CHLORIDE, AND POTASSIUM CHLORIDE 50; 1.49; 4.5 G/1000ML; G/1000ML; G/1000ML
INJECTION, SOLUTION INTRAVENOUS CONTINUOUS
Status: DISCONTINUED | OUTPATIENT
Start: 2024-10-30 | End: 2024-10-31

## 2024-10-30 RX ORDER — SODIUM CHLORIDE 9 MG/ML
INJECTION, SOLUTION INTRAVENOUS PRN
Status: DISCONTINUED | OUTPATIENT
Start: 2024-10-30 | End: 2024-10-30 | Stop reason: HOSPADM

## 2024-10-30 RX ORDER — DEXTROSE MONOHYDRATE 100 MG/ML
INJECTION, SOLUTION INTRAVENOUS CONTINUOUS PRN
Status: DISCONTINUED | OUTPATIENT
Start: 2024-10-30 | End: 2024-11-06 | Stop reason: HOSPADM

## 2024-10-30 RX ORDER — MEPERIDINE HYDROCHLORIDE 25 MG/ML
12.5 INJECTION INTRAMUSCULAR; INTRAVENOUS; SUBCUTANEOUS EVERY 5 MIN PRN
Status: DISCONTINUED | OUTPATIENT
Start: 2024-10-30 | End: 2024-10-30 | Stop reason: HOSPADM

## 2024-10-30 RX ORDER — METOCLOPRAMIDE HYDROCHLORIDE 5 MG/ML
10 INJECTION INTRAMUSCULAR; INTRAVENOUS
Status: DISCONTINUED | OUTPATIENT
Start: 2024-10-30 | End: 2024-10-30 | Stop reason: HOSPADM

## 2024-10-30 RX ORDER — IPRATROPIUM BROMIDE AND ALBUTEROL SULFATE 2.5; .5 MG/3ML; MG/3ML
1 SOLUTION RESPIRATORY (INHALATION)
Status: DISCONTINUED | OUTPATIENT
Start: 2024-10-30 | End: 2024-10-31

## 2024-10-30 RX ORDER — SODIUM CHLORIDE, SODIUM LACTATE, POTASSIUM CHLORIDE, CALCIUM CHLORIDE 600; 310; 30; 20 MG/100ML; MG/100ML; MG/100ML; MG/100ML
INJECTION, SOLUTION INTRAVENOUS
Status: DISCONTINUED | OUTPATIENT
Start: 2024-10-30 | End: 2024-10-30 | Stop reason: SDUPTHER

## 2024-10-30 RX ORDER — GLYCOPYRROLATE 0.2 MG/ML
INJECTION INTRAMUSCULAR; INTRAVENOUS
Status: DISCONTINUED | OUTPATIENT
Start: 2024-10-30 | End: 2024-10-30 | Stop reason: SDUPTHER

## 2024-10-30 RX ORDER — ONDANSETRON 4 MG/1
4 TABLET, ORALLY DISINTEGRATING ORAL EVERY 8 HOURS PRN
Status: DISCONTINUED | OUTPATIENT
Start: 2024-10-30 | End: 2024-11-06 | Stop reason: HOSPADM

## 2024-10-30 RX ORDER — HYDROMORPHONE HYDROCHLORIDE 1 MG/ML
0.5 INJECTION, SOLUTION INTRAMUSCULAR; INTRAVENOUS; SUBCUTANEOUS EVERY 5 MIN PRN
Status: DISCONTINUED | OUTPATIENT
Start: 2024-10-30 | End: 2024-10-30 | Stop reason: HOSPADM

## 2024-10-30 RX ORDER — SODIUM CHLORIDE 0.9 % (FLUSH) 0.9 %
5-40 SYRINGE (ML) INJECTION EVERY 12 HOURS SCHEDULED
Status: DISCONTINUED | OUTPATIENT
Start: 2024-10-30 | End: 2024-11-06 | Stop reason: HOSPADM

## 2024-10-30 RX ORDER — FENTANYL CITRATE 50 UG/ML
INJECTION, SOLUTION INTRAMUSCULAR; INTRAVENOUS
Status: DISCONTINUED | OUTPATIENT
Start: 2024-10-30 | End: 2024-10-30 | Stop reason: SDUPTHER

## 2024-10-30 RX ORDER — LIDOCAINE 4 G/G
1 PATCH TOPICAL AS NEEDED
Status: DISCONTINUED | OUTPATIENT
Start: 2024-10-30 | End: 2024-10-30 | Stop reason: HOSPADM

## 2024-10-30 RX ORDER — FENTANYL CITRATE 0.05 MG/ML
50 INJECTION, SOLUTION INTRAMUSCULAR; INTRAVENOUS EVERY 5 MIN PRN
Status: DISCONTINUED | OUTPATIENT
Start: 2024-10-30 | End: 2024-10-30 | Stop reason: HOSPADM

## 2024-10-30 RX ORDER — LIDOCAINE HYDROCHLORIDE 20 MG/ML
INJECTION, SOLUTION EPIDURAL; INFILTRATION; INTRACAUDAL; PERINEURAL
Status: DISCONTINUED | OUTPATIENT
Start: 2024-10-30 | End: 2024-10-30 | Stop reason: SDUPTHER

## 2024-10-30 RX ORDER — LABETALOL HYDROCHLORIDE 5 MG/ML
10 INJECTION, SOLUTION INTRAVENOUS
Status: DISCONTINUED | OUTPATIENT
Start: 2024-10-30 | End: 2024-10-30 | Stop reason: HOSPADM

## 2024-10-30 RX ORDER — SODIUM CHLORIDE 9 MG/ML
INJECTION, SOLUTION INTRAVENOUS PRN
Status: DISCONTINUED | OUTPATIENT
Start: 2024-10-30 | End: 2024-11-06 | Stop reason: HOSPADM

## 2024-10-30 RX ORDER — NALOXONE HYDROCHLORIDE 0.4 MG/ML
INJECTION, SOLUTION INTRAMUSCULAR; INTRAVENOUS; SUBCUTANEOUS PRN
Status: DISCONTINUED | OUTPATIENT
Start: 2024-10-30 | End: 2024-10-30 | Stop reason: HOSPADM

## 2024-10-30 RX ORDER — METRONIDAZOLE 500 MG/100ML
500 INJECTION, SOLUTION INTRAVENOUS ONCE
Status: COMPLETED | OUTPATIENT
Start: 2024-10-30 | End: 2024-10-30

## 2024-10-30 RX ORDER — ROCURONIUM BROMIDE 10 MG/ML
INJECTION, SOLUTION INTRAVENOUS
Status: DISCONTINUED | OUTPATIENT
Start: 2024-10-30 | End: 2024-10-30 | Stop reason: SDUPTHER

## 2024-10-30 RX ORDER — SODIUM CHLORIDE 0.9 % (FLUSH) 0.9 %
5-40 SYRINGE (ML) INJECTION PRN
Status: DISCONTINUED | OUTPATIENT
Start: 2024-10-30 | End: 2024-11-06 | Stop reason: HOSPADM

## 2024-10-30 RX ORDER — GLUCAGON 1 MG/ML
1 KIT INJECTION PRN
Status: DISCONTINUED | OUTPATIENT
Start: 2024-10-30 | End: 2024-10-30 | Stop reason: HOSPADM

## 2024-10-30 RX ORDER — IPRATROPIUM BROMIDE AND ALBUTEROL SULFATE 2.5; .5 MG/3ML; MG/3ML
1 SOLUTION RESPIRATORY (INHALATION)
Status: DISCONTINUED | OUTPATIENT
Start: 2024-10-30 | End: 2024-10-30 | Stop reason: HOSPADM

## 2024-10-30 RX ORDER — INSULIN LISPRO 100 [IU]/ML
0-4 INJECTION, SOLUTION INTRAVENOUS; SUBCUTANEOUS EVERY 6 HOURS SCHEDULED
Status: DISCONTINUED | OUTPATIENT
Start: 2024-10-31 | End: 2024-11-06 | Stop reason: HOSPADM

## 2024-10-30 RX ORDER — PROPOFOL 10 MG/ML
INJECTION, EMULSION INTRAVENOUS
Status: DISCONTINUED | OUTPATIENT
Start: 2024-10-30 | End: 2024-10-30 | Stop reason: SDUPTHER

## 2024-10-30 RX ORDER — HYDRALAZINE HYDROCHLORIDE 20 MG/ML
10 INJECTION INTRAMUSCULAR; INTRAVENOUS
Status: DISCONTINUED | OUTPATIENT
Start: 2024-10-30 | End: 2024-10-30 | Stop reason: HOSPADM

## 2024-10-30 RX ORDER — DEXTROSE MONOHYDRATE 100 MG/ML
INJECTION, SOLUTION INTRAVENOUS CONTINUOUS PRN
Status: DISCONTINUED | OUTPATIENT
Start: 2024-10-30 | End: 2024-10-30 | Stop reason: HOSPADM

## 2024-10-30 RX ORDER — NOREPINEPHRINE BITARTRATE/D5W 4MG/250ML
PLASTIC BAG, INJECTION (ML) INTRAVENOUS
Status: DISCONTINUED | OUTPATIENT
Start: 2024-10-30 | End: 2024-10-30 | Stop reason: SDUPTHER

## 2024-10-30 RX ORDER — OXYCODONE HYDROCHLORIDE 5 MG/1
5 TABLET ORAL PRN
Status: DISCONTINUED | OUTPATIENT
Start: 2024-10-30 | End: 2024-10-30 | Stop reason: HOSPADM

## 2024-10-30 RX ORDER — ONDANSETRON 2 MG/ML
4 INJECTION INTRAMUSCULAR; INTRAVENOUS
Status: DISCONTINUED | OUTPATIENT
Start: 2024-10-30 | End: 2024-10-30 | Stop reason: HOSPADM

## 2024-10-30 RX ORDER — ONDANSETRON 2 MG/ML
INJECTION INTRAMUSCULAR; INTRAVENOUS
Status: DISCONTINUED | OUTPATIENT
Start: 2024-10-30 | End: 2024-10-30 | Stop reason: SDUPTHER

## 2024-10-30 RX ORDER — SODIUM CHLORIDE, SODIUM LACTATE, POTASSIUM CHLORIDE, CALCIUM CHLORIDE 600; 310; 30; 20 MG/100ML; MG/100ML; MG/100ML; MG/100ML
INJECTION, SOLUTION INTRAVENOUS ONCE
Status: DISCONTINUED | OUTPATIENT
Start: 2024-10-30 | End: 2024-10-30 | Stop reason: HOSPADM

## 2024-10-30 RX ORDER — HYDRALAZINE HYDROCHLORIDE 20 MG/ML
10 INJECTION INTRAMUSCULAR; INTRAVENOUS EVERY 6 HOURS PRN
Status: DISCONTINUED | OUTPATIENT
Start: 2024-10-30 | End: 2024-11-06 | Stop reason: HOSPADM

## 2024-10-30 RX ORDER — OXYCODONE HYDROCHLORIDE 5 MG/1
10 TABLET ORAL PRN
Status: DISCONTINUED | OUTPATIENT
Start: 2024-10-30 | End: 2024-10-30 | Stop reason: HOSPADM

## 2024-10-30 RX ORDER — NEOSTIGMINE METHYLSULFATE 5 MG/5 ML
SYRINGE (ML) INTRAVENOUS
Status: DISCONTINUED | OUTPATIENT
Start: 2024-10-30 | End: 2024-10-30 | Stop reason: SDUPTHER

## 2024-10-30 RX ADMIN — FENTANYL CITRATE 25 MCG: 50 INJECTION INTRAMUSCULAR; INTRAVENOUS at 14:26

## 2024-10-30 RX ADMIN — ONDANSETRON 4 MG: 2 INJECTION INTRAMUSCULAR; INTRAVENOUS at 20:13

## 2024-10-30 RX ADMIN — Medication 4 MG: at 17:05

## 2024-10-30 RX ADMIN — ROCURONIUM BROMIDE 10 MG: 10 INJECTION, SOLUTION INTRAVENOUS at 14:51

## 2024-10-30 RX ADMIN — DEXAMETHASONE SODIUM PHOSPHATE 8 MG: 4 INJECTION, SOLUTION INTRA-ARTICULAR; INTRALESIONAL; INTRAMUSCULAR; INTRAVENOUS; SOFT TISSUE at 14:25

## 2024-10-30 RX ADMIN — ONDANSETRON 4 MG: 2 INJECTION INTRAMUSCULAR; INTRAVENOUS at 17:15

## 2024-10-30 RX ADMIN — IPRATROPIUM BROMIDE AND ALBUTEROL SULFATE 1 DOSE: .5; 2.5 SOLUTION RESPIRATORY (INHALATION) at 21:28

## 2024-10-30 RX ADMIN — PROPOFOL 20 MG: 10 INJECTION, EMULSION INTRAVENOUS at 17:02

## 2024-10-30 RX ADMIN — GLYCOPYRROLATE 0.4 MG: 0.2 INJECTION INTRAMUSCULAR; INTRAVENOUS at 17:05

## 2024-10-30 RX ADMIN — ALBUMIN (HUMAN) 250 ML: 12.5 INJECTION, SOLUTION INTRAVENOUS at 14:28

## 2024-10-30 RX ADMIN — ONDANSETRON 4 MG: 2 INJECTION INTRAMUSCULAR; INTRAVENOUS at 14:25

## 2024-10-30 RX ADMIN — ROCURONIUM BROMIDE 40 MG: 10 INJECTION, SOLUTION INTRAVENOUS at 13:59

## 2024-10-30 RX ADMIN — SODIUM CHLORIDE: 9 INJECTION, SOLUTION INTRAVENOUS at 13:14

## 2024-10-30 RX ADMIN — HYDROMORPHONE HYDROCHLORIDE 1 MG: 1 INJECTION, SOLUTION INTRAMUSCULAR; INTRAVENOUS; SUBCUTANEOUS at 20:10

## 2024-10-30 RX ADMIN — POTASSIUM CHLORIDE, DEXTROSE MONOHYDRATE AND SODIUM CHLORIDE: 150; 5; 450 INJECTION, SOLUTION INTRAVENOUS at 20:06

## 2024-10-30 RX ADMIN — FENTANYL CITRATE 25 MCG: 50 INJECTION INTRAMUSCULAR; INTRAVENOUS at 17:12

## 2024-10-30 RX ADMIN — CEFAZOLIN 2000 MG: 1 INJECTION, POWDER, FOR SOLUTION INTRAMUSCULAR; INTRAVENOUS at 13:54

## 2024-10-30 RX ADMIN — SODIUM CHLORIDE, POTASSIUM CHLORIDE, SODIUM LACTATE AND CALCIUM CHLORIDE: 600; 310; 30; 20 INJECTION, SOLUTION INTRAVENOUS at 14:48

## 2024-10-30 RX ADMIN — PROPOFOL 150 MG: 10 INJECTION, EMULSION INTRAVENOUS at 13:59

## 2024-10-30 RX ADMIN — FENTANYL CITRATE 25 MCG: 50 INJECTION INTRAMUSCULAR; INTRAVENOUS at 17:18

## 2024-10-30 RX ADMIN — PROPOFOL 30 MG: 10 INJECTION, EMULSION INTRAVENOUS at 17:16

## 2024-10-30 RX ADMIN — PIPERACILLIN AND TAZOBACTAM 4500 MG: 4; .5 INJECTION, POWDER, FOR SOLUTION INTRAVENOUS at 20:10

## 2024-10-30 RX ADMIN — LIDOCAINE HYDROCHLORIDE 100 MG: 20 SOLUTION INTRAVENOUS at 13:59

## 2024-10-30 RX ADMIN — GLYCOPYRROLATE 0.2 MG: 0.2 INJECTION INTRAMUSCULAR; INTRAVENOUS at 14:25

## 2024-10-30 RX ADMIN — FENTANYL CITRATE 25 MCG: 50 INJECTION INTRAMUSCULAR; INTRAVENOUS at 14:28

## 2024-10-30 RX ADMIN — SODIUM CHLORIDE, PRESERVATIVE FREE 10 ML: 5 INJECTION INTRAVENOUS at 21:00

## 2024-10-30 RX ADMIN — METRONIDAZOLE 500 MG: 500 INJECTION, SOLUTION INTRAVENOUS at 13:15

## 2024-10-30 RX ADMIN — FENTANYL CITRATE 25 MCG: 50 INJECTION INTRAMUSCULAR; INTRAVENOUS at 16:09

## 2024-10-30 RX ADMIN — Medication 5 MCG/MIN: at 14:25

## 2024-10-30 RX ADMIN — FENTANYL CITRATE 25 MCG: 50 INJECTION INTRAMUSCULAR; INTRAVENOUS at 17:02

## 2024-10-30 RX ADMIN — FENTANYL CITRATE 50 MCG: 50 INJECTION INTRAMUSCULAR; INTRAVENOUS at 13:59

## 2024-10-30 ASSESSMENT — PAIN - FUNCTIONAL ASSESSMENT
PAIN_FUNCTIONAL_ASSESSMENT: ACTIVITIES ARE NOT PREVENTED
PAIN_FUNCTIONAL_ASSESSMENT: 0-10

## 2024-10-30 ASSESSMENT — PAIN DESCRIPTION - DESCRIPTORS: DESCRIPTORS: ACHING

## 2024-10-30 ASSESSMENT — PAIN DESCRIPTION - LOCATION
LOCATION: ABDOMEN
LOCATION: ABDOMEN

## 2024-10-30 ASSESSMENT — PAIN SCALES - GENERAL
PAINLEVEL_OUTOF10: 10
PAINLEVEL_OUTOF10: 0
PAINLEVEL_OUTOF10: 0
PAINLEVEL_OUTOF10: 10
PAINLEVEL_OUTOF10: 0

## 2024-10-30 ASSESSMENT — PAIN DESCRIPTION - FREQUENCY: FREQUENCY: INTERMITTENT

## 2024-10-30 ASSESSMENT — PAIN DESCRIPTION - ORIENTATION
ORIENTATION: ANTERIOR
ORIENTATION: ANTERIOR

## 2024-10-30 ASSESSMENT — PAIN DESCRIPTION - PAIN TYPE: TYPE: SURGICAL PAIN

## 2024-10-30 NOTE — BRIEF OP NOTE
Brief Postoperative Note      Patient: Britta Quevedo  YOB: 1950  MRN: [id number]    Date of Procedure: 10/30/2024    Pre-Op Diagnosis Codes:      * Ventral hernia without obstruction or gangrene [K43.9]     * Colostomy status ([facility]) [Z93.3]    Post-Op Diagnosis:  Ventral hernia right flank, defect at least 5 x 6 cm.  Extensive intra-abdominal adhesions.  Stricture noted at the sigmoid colon which required resection.       Procedure(s):  OPEN COLOSTOMY REVERSAL AND VENTRAL HERNIA REPAIR, appendectomy    Colostomy reversal with a EEA 29 mm for anastomosis anastomosis made at the proximal rectum    Lysis of adhesion.  Ventral hernia repair with 4 x 6 inch ventral light mesh.    Surgeon(s):  Berto Yen MD    Assistant:  * No surgical staff found *    Anesthesia: General    Estimated Blood Loss (mL): 200     Complications: None    Specimens:   ID Type Source Tests Collected by Time Destination   1 : appendix Tissue Appendix SURGICAL PATHOLOGY Berto Yen MD 10/30/2024 1445    2 : distal sigmoid colon stump Tissue Sigmoid Colon SURGICAL PATHOLOGY Berto Yen MD 10/30/2024 1532    3 : Sigmoid Colon distal Tissue Sigmoid Colon SURGICAL PATHOLOGY Berto Yen MD 10/30/2024 1550    A : montez urine Urine Urine, indwelling catheter CULTURE, URINE Berto Yen MD 10/30/2024 1446        Implants:  Implant Name Type Inv. Item Serial No.  Lot No. LRB No. Used Action   MESH SURG [id number] ELLIPSE [employer] VENTRALIGHT - SNA  MESH SURG [id number] ELLIPSE [employer] VENTRALIGHT NA BARD DAVOL- [id number] Right 1 Implanted         Drains:   Closed/Suction Drain Inferior Stomach Bulb (Active)   Site Description Clean, dry & intact 10/30/24 1756   Dressing Status Clean, dry & intact 10/30/24 1756   Drainage Appearance Bloody 10/30/24 1756   Drain Status To bulb suction 10/30/24 1756   Output (ml) 40 ml 10/30/24 1756       NG/OG/NJ/NE Tube Nasogastric 18 fr Right nostril (Active)

## 2024-10-30 NOTE — ANESTHESIA PRE PROCEDURE
Department of Anesthesiology  Preprocedure Note       Name:  Britta Quevedo   Age:  74 y.o.  :  1950                                          MRN:  [id number]         Date:  10/30/2024      Surgeon: Surgeon(s):  Berto Yen MD    Procedure: Procedure(s):  OPEN COLOSTOMY REVERSAL AND VENTRAL HERNIA REPAIR    Medications prior to admission:   Prior to Admission medications    Medication Sig Start Date End Date Taking? Authorizing Provider   XELJANZ XR 11 MG TB24 Take 1 tablet by mouth daily 24  Yes ProviderHaleigh MD   rosuvastatin (CRESTOR) 20 MG tablet Take 1 tablet by mouth daily 20  Yes ProviderHaleigh MD   lisinopril-hydroCHLOROthiazide (PRINZIDE;ZESTORETIC) 20-25 MG per tablet Take 1 tablet by mouth daily 22  Yes ProviderHaleigh MD   escitalopram (LEXAPRO) 20 MG tablet Take 1 tablet by mouth daily   Yes Automatic Reconciliation, Ar   traMADol (ULTRAM) 50 MG tablet Take 1 tablet by mouth every 6 hours as needed for Pain.    Provider, MD Haleigh       Current medications:    Current Facility-Administered Medications   Medication Dose Route Frequency Provider Last Rate Last Admin    0.9 % sodium chloride infusion   IntraVENous Continuous Berto Yen MD 20 mL/hr at 10/30/24 1314 New Bag at 10/30/24 1314    sodium chloride flush 0.9 % injection 5-40 mL  5-40 mL IntraVENous 2 times per day Berto Yen MD        sodium chloride flush 0.9 % injection 5-40 mL  5-40 mL IntraVENous PRN Berto Yen MD        0.9 % sodium chloride infusion   IntraVENous PRN Berto Yen MD        lactated ringers infusion   IntraVENous Continuous Berto Yen MD        ceFAZolin (ANCEF) 2,000 mg in sterile water 20 mL IV syringe  2,000 mg IntraVENous On Call to OR Berto Yen MD        metroNIDAZOLE (FLAGYL) 500 mg in 0.9% NaCl 100 mL IVPB premix  500 mg IntraVENous Once Berto Yen  mL/hr at 10/30/24 1315 500 mg at 10/30/24 1315    scopolamine (TRANSDERM-SCOP)

## 2024-10-30 NOTE — H&P
General surgery history and Physical     Patient: Britta Quevedo                    MRN: [id number]          YOB: 1950          Age: 74 y.o.     Sex: female      Chief Complaint:          Chief Complaint   Patient presents with    Follow-up       Colostomy Check          History of Present Illness: Britta Quevedo is a 74 y.o. very pleasant woman is here today to discuss preop evaluation.  Patient had perforated bowel during the colonoscopy examination requiring sigmoid colon resection and colostomy placement.  Patient also complaining of the right flank pain today.  Patient apparently had spine surgery during the anterior and posterior approach, right flank incision.  Now she noted swelling and pain localized right side abdomen.  Colostomy is working okay     Social History:  Social Connections: Not on file         Past Medical History:  Past Medical History           Past Medical History:   Diagnosis Date    Arthritis      Chronic kidney disease      Depression      Endometriosis      Hyperlipemia           Surgical History:  Past Surgical History             Past Surgical History:   Procedure Laterality Date    BACK SURGERY        CHOLECYSTECTOMY        COLONOSCOPY        COLONOSCOPY N/A 4/22/2024     COLONOSCOPY performed by Julian Pratt MD at [facility] ENDOSCOPY    HYSTERECTOMY (CERVIX STATUS UNKNOWN)        LAPAROSCOPY N/A 4/22/2024     LAPAROSCOPY DIAGNOSTIC, PARTIAL SIGMOID COLECTOMY WITH COLOSTOMY CREATION performed by Berto Yen MD at [facility] MAIN OR    TONSILLECTOMY                Allergies:  Allergies             Allergies   Allergen Reactions    Meperidine         Other reaction(s): Unknown (comments)    Sulfa Antibiotics         Other reaction(s): Unknown (comments)            Current Meds:  Current Facility-Administered Medications               Current Outpatient Medications   Medication Sig Dispense Refill    XELJANZ XR 11 MG TB24 Take 1 tablet by mouth daily        rosuvastatin (CRESTOR) 20

## 2024-10-30 NOTE — PROGRESS NOTES
1. Have you been to the ER, urgent care clinic since your last visit?  Hospitalized since your last visit?NO    2. Have you seen or consulted any other health care providers outside of the [facility] since your last visit?  Include any pap smears or colon screening. NO    Chief Complaint   Patient presents with    Follow-up     To discuss surgery          
pain    Ventral hernia without obstruction or gangrene    Colostomy status ([facility])       Plans: I did review CT scan abdomen pelvis.  Patient will need required ventral hernia repair on the right flank area with mesh implantation.  Patient also scheduled for colostomy takedown.  Due to complexity of the abdominal wall problem, we talked about open surgery instead laparoscopic approach.  Patient scheduled for colostomy reversal as well as right flank ventral hernia repair with mesh implantation on the October 30, 2024.  Patient was discussed surgical risks benefits complications and postop hospital course was discussed in details.          Farshad Thomson MD

## 2024-10-30 NOTE — PROGRESS NOTES
Pharmacy Note     Zosyn 3.375gm IV Q6H ordered for treatment of intra-abdominal infection. Per [facility] Policy, Zosyn will be changed to 4.5 gm once followed by 3.375 gm IV q8H.     Estimated Creatinine Clearance: Estimated Creatinine Clearance: 47 mL/min (based on SCr of 0.98 mg/dL).    BMI:  There is no height or weight on file to calculate BMI.    Rationale for Adjustment:  [facility] B-Lactam extended infusion policy    Pharmacy will continue to monitor and adjust dose as necessary.      Please call with any questions.    Thank you,  Batsheva Martinez [facility]

## 2024-10-31 ENCOUNTER — APPOINTMENT (OUTPATIENT)
Facility: HOSPITAL | Age: 74
DRG: 329 | End: 2024-10-31
Attending: SURGERY
Payer: MEDICARE

## 2024-10-31 LAB
ALBUMIN SERPL-MCNC: 4 G/DL (ref 3.5–5)
ALBUMIN/GLOB SERPL: 1.3 (ref 1.1–2.2)
ALP SERPL-CCNC: 42 U/L (ref 45–117)
ALT SERPL-CCNC: 41 U/L (ref 12–78)
ANION GAP SERPL CALC-SCNC: 11 MMOL/L (ref 2–12)
AST SERPL W P-5'-P-CCNC: 35 U/L (ref 15–37)
BASOPHILS # BLD: 0 K/UL (ref 0–0.1)
BASOPHILS NFR BLD: 0 % (ref 0–1)
BILIRUB SERPL-MCNC: 0.9 MG/DL (ref 0.2–1)
BUN SERPL-MCNC: 20 MG/DL (ref 6–20)
BUN/CREAT SERPL: 14 (ref 12–20)
CA-I BLD-MCNC: 9.2 MG/DL (ref 8.5–10.1)
CHLORIDE SERPL-SCNC: 110 MMOL/L (ref 97–108)
CO2 SERPL-SCNC: 21 MMOL/L (ref 21–32)
CREAT SERPL-MCNC: 1.47 MG/DL (ref 0.55–1.02)
DIFFERENTIAL METHOD BLD: ABNORMAL
EOSINOPHIL # BLD: 0 K/UL (ref 0–0.4)
EOSINOPHIL NFR BLD: 0 % (ref 0–7)
ERYTHROCYTE [DISTWIDTH] IN BLOOD BY AUTOMATED COUNT: 14.7 % (ref 11.5–14.5)
EST. AVERAGE GLUCOSE BLD GHB EST-MCNC: 120 MG/DL
GLOBULIN SER CALC-MCNC: 3 G/DL (ref 2–4)
GLUCOSE BLD STRIP.AUTO-MCNC: 137 MG/DL (ref 65–100)
GLUCOSE BLD STRIP.AUTO-MCNC: 137 MG/DL (ref 65–100)
GLUCOSE BLD STRIP.AUTO-MCNC: 195 MG/DL (ref 65–100)
GLUCOSE BLD STRIP.AUTO-MCNC: 274 MG/DL (ref 65–100)
GLUCOSE BLD STRIP.AUTO-MCNC: 278 MG/DL (ref 65–100)
GLUCOSE SERPL-MCNC: 278 MG/DL (ref 65–100)
HBA1C MFR BLD: 5.8 % (ref 4–5.6)
HCT VFR BLD AUTO: 29.8 % (ref 35–47)
HGB BLD-MCNC: 9.8 G/DL (ref 11.5–16)
IMM GRANULOCYTES # BLD AUTO: 0 K/UL (ref 0–0.04)
IMM GRANULOCYTES NFR BLD AUTO: 0 % (ref 0–0.5)
LYMPHOCYTES # BLD: 0.2 K/UL (ref 0.8–3.5)
LYMPHOCYTES NFR BLD: 2 % (ref 12–49)
MCH RBC QN AUTO: 29.4 PG (ref 26–34)
MCHC RBC AUTO-ENTMCNC: 32.9 G/DL (ref 30–36.5)
MCV RBC AUTO: 89.5 FL (ref 80–99)
MONOCYTES # BLD: 0.7 K/UL (ref 0–1)
MONOCYTES NFR BLD: 7 % (ref 5–13)
NEUTS SEG # BLD: 8.5 K/UL (ref 1.8–8)
NEUTS SEG NFR BLD: 91 % (ref 32–75)
NRBC # BLD: 0 K/UL (ref 0–0.01)
NRBC BLD-RTO: 0 PER 100 WBC
PERFORMED BY:: ABNORMAL
PLATELET # BLD AUTO: 199 K/UL (ref 150–400)
PMV BLD AUTO: 9.6 FL (ref 8.9–12.9)
POTASSIUM SERPL-SCNC: 3.8 MMOL/L (ref 3.5–5.1)
PROT SERPL-MCNC: 7 G/DL (ref 6.4–8.2)
RBC # BLD AUTO: 3.33 M/UL (ref 3.8–5.2)
SODIUM SERPL-SCNC: 142 MMOL/L (ref 136–145)
WBC # BLD AUTO: 9.4 K/UL (ref 3.6–11)

## 2024-10-31 PROCEDURE — 2500000003 HC RX 250 WO HCPCS: Performed by: NURSE PRACTITIONER

## 2024-10-31 PROCEDURE — 2580000003 HC RX 258: Performed by: NURSE PRACTITIONER

## 2024-10-31 PROCEDURE — 2700000000 HC OXYGEN THERAPY PER DAY

## 2024-10-31 PROCEDURE — 94669 MECHANICAL CHEST WALL OSCILL: CPT

## 2024-10-31 PROCEDURE — 6360000002 HC RX W HCPCS: Performed by: SURGERY

## 2024-10-31 PROCEDURE — 2580000003 HC RX 258: Performed by: SURGERY

## 2024-10-31 PROCEDURE — 85025 COMPLETE CBC W/AUTO DIFF WBC: CPT

## 2024-10-31 PROCEDURE — 36569 INSJ PICC 5 YR+ W/O IMAGING: CPT

## 2024-10-31 PROCEDURE — 44625 REPAIR BOWEL OPENING: CPT | Performed by: SURGERY

## 2024-10-31 PROCEDURE — 02HV33Z INSERTION OF INFUSION DEVICE INTO SUPERIOR VENA CAVA, PERCUTANEOUS APPROACH: ICD-10-PCS | Performed by: SURGERY

## 2024-10-31 PROCEDURE — 6370000000 HC RX 637 (ALT 250 FOR IP): Performed by: NURSE PRACTITIONER

## 2024-10-31 PROCEDURE — 2500000003 HC RX 250 WO HCPCS: Performed by: SURGERY

## 2024-10-31 PROCEDURE — 71045 X-RAY EXAM CHEST 1 VIEW: CPT

## 2024-10-31 PROCEDURE — 83036 HEMOGLOBIN GLYCOSYLATED A1C: CPT

## 2024-10-31 PROCEDURE — 6370000000 HC RX 637 (ALT 250 FOR IP): Performed by: SURGERY

## 2024-10-31 PROCEDURE — 36415 COLL VENOUS BLD VENIPUNCTURE: CPT

## 2024-10-31 PROCEDURE — 6360000002 HC RX W HCPCS: Performed by: INTERNAL MEDICINE

## 2024-10-31 PROCEDURE — 80053 COMPREHEN METABOLIC PANEL: CPT

## 2024-10-31 PROCEDURE — 82962 GLUCOSE BLOOD TEST: CPT

## 2024-10-31 PROCEDURE — 49593 RPR AA HRN 1ST 3-10 RDC: CPT | Performed by: SURGERY

## 2024-10-31 PROCEDURE — 94761 N-INVAS EAR/PLS OXIMETRY MLT: CPT

## 2024-10-31 PROCEDURE — 51798 US URINE CAPACITY MEASURE: CPT

## 2024-10-31 PROCEDURE — 94640 AIRWAY INHALATION TREATMENT: CPT

## 2024-10-31 PROCEDURE — 1100000000 HC RM PRIVATE

## 2024-10-31 PROCEDURE — 6370000000 HC RX 637 (ALT 250 FOR IP): Performed by: INTERNAL MEDICINE

## 2024-10-31 RX ORDER — POTASSIUM CHLORIDE 7.45 MG/ML
10 INJECTION INTRAVENOUS PRN
Status: DISCONTINUED | OUTPATIENT
Start: 2024-10-31 | End: 2024-11-01

## 2024-10-31 RX ORDER — POTASSIUM CHLORIDE 1500 MG/1
40 TABLET, EXTENDED RELEASE ORAL PRN
Status: DISCONTINUED | OUTPATIENT
Start: 2024-10-31 | End: 2024-11-01

## 2024-10-31 RX ORDER — IPRATROPIUM BROMIDE AND ALBUTEROL SULFATE 2.5; .5 MG/3ML; MG/3ML
1 SOLUTION RESPIRATORY (INHALATION)
Status: DISCONTINUED | OUTPATIENT
Start: 2024-10-31 | End: 2024-11-03

## 2024-10-31 RX ORDER — POTASSIUM CHLORIDE 1.5 G/1.58G
40 POWDER, FOR SOLUTION ORAL PRN
Status: DISCONTINUED | OUTPATIENT
Start: 2024-10-31 | End: 2024-11-01

## 2024-10-31 RX ORDER — MAGNESIUM SULFATE IN WATER 40 MG/ML
2000 INJECTION, SOLUTION INTRAVENOUS PRN
Status: DISCONTINUED | OUTPATIENT
Start: 2024-10-31 | End: 2024-11-06 | Stop reason: HOSPADM

## 2024-10-31 RX ORDER — SODIUM CHLORIDE AND POTASSIUM CHLORIDE 300; 900 MG/100ML; MG/100ML
INJECTION, SOLUTION INTRAVENOUS CONTINUOUS
Status: DISCONTINUED | OUTPATIENT
Start: 2024-10-31 | End: 2024-11-01

## 2024-10-31 RX ADMIN — PIPERACILLIN AND TAZOBACTAM 3375 MG: 3; .375 INJECTION, POWDER, LYOPHILIZED, FOR SOLUTION INTRAVENOUS at 02:16

## 2024-10-31 RX ADMIN — ONDANSETRON 4 MG: 2 INJECTION INTRAMUSCULAR; INTRAVENOUS at 08:31

## 2024-10-31 RX ADMIN — HYDROMORPHONE HYDROCHLORIDE 1 MG: 1 INJECTION, SOLUTION INTRAMUSCULAR; INTRAVENOUS; SUBCUTANEOUS at 23:31

## 2024-10-31 RX ADMIN — PIPERACILLIN AND TAZOBACTAM 3375 MG: 3; .375 INJECTION, POWDER, LYOPHILIZED, FOR SOLUTION INTRAVENOUS at 20:05

## 2024-10-31 RX ADMIN — HYDROMORPHONE HYDROCHLORIDE 1 MG: 1 INJECTION, SOLUTION INTRAMUSCULAR; INTRAVENOUS; SUBCUTANEOUS at 09:49

## 2024-10-31 RX ADMIN — ONDANSETRON 4 MG: 2 INJECTION INTRAMUSCULAR; INTRAVENOUS at 23:38

## 2024-10-31 RX ADMIN — IPRATROPIUM BROMIDE AND ALBUTEROL SULFATE 1 DOSE: .5; 2.5 SOLUTION RESPIRATORY (INHALATION) at 20:00

## 2024-10-31 RX ADMIN — IPRATROPIUM BROMIDE AND ALBUTEROL SULFATE 1 DOSE: .5; 2.5 SOLUTION RESPIRATORY (INHALATION) at 09:13

## 2024-10-31 RX ADMIN — PIPERACILLIN AND TAZOBACTAM 3375 MG: 3; .375 INJECTION, POWDER, LYOPHILIZED, FOR SOLUTION INTRAVENOUS at 09:38

## 2024-10-31 RX ADMIN — INSULIN LISPRO 2 UNITS: 100 INJECTION, SOLUTION INTRAVENOUS; SUBCUTANEOUS at 00:00

## 2024-10-31 RX ADMIN — INSULIN LISPRO 2 UNITS: 100 INJECTION, SOLUTION INTRAVENOUS; SUBCUTANEOUS at 06:06

## 2024-10-31 RX ADMIN — IPRATROPIUM BROMIDE AND ALBUTEROL SULFATE 1 DOSE: .5; 2.5 SOLUTION RESPIRATORY (INHALATION) at 14:24

## 2024-10-31 RX ADMIN — POTASSIUM CHLORIDE AND SODIUM CHLORIDE: 900; 300 INJECTION, SOLUTION INTRAVENOUS at 13:32

## 2024-10-31 RX ADMIN — INSULIN LISPRO 1 UNITS: 100 INJECTION, SOLUTION INTRAVENOUS; SUBCUTANEOUS at 13:15

## 2024-10-31 RX ADMIN — POTASSIUM CHLORIDE, DEXTROSE MONOHYDRATE AND SODIUM CHLORIDE: 150; 5; 450 INJECTION, SOLUTION INTRAVENOUS at 06:11

## 2024-10-31 RX ADMIN — SODIUM CHLORIDE, PRESERVATIVE FREE 20 MG: 5 INJECTION INTRAVENOUS at 08:15

## 2024-10-31 RX ADMIN — SODIUM CHLORIDE, PRESERVATIVE FREE 10 ML: 5 INJECTION INTRAVENOUS at 21:11

## 2024-10-31 RX ADMIN — SODIUM CHLORIDE, PRESERVATIVE FREE 10 ML: 5 INJECTION INTRAVENOUS at 08:16

## 2024-10-31 ASSESSMENT — PAIN DESCRIPTION - LOCATION
LOCATION: ABDOMEN
LOCATION: ABDOMEN

## 2024-10-31 ASSESSMENT — PAIN SCALES - GENERAL
PAINLEVEL_OUTOF10: 0
PAINLEVEL_OUTOF10: 10
PAINLEVEL_OUTOF10: 0
PAINLEVEL_OUTOF10: 10

## 2024-10-31 NOTE — PROCEDURES
PROCEDURE NOTE  Date: 10/31/2024   Name: Britta Quevedo  YOB: 1950    Procedures  PICC Line Insertion Procedure Note    Procedure: Insertion of #5Fr PICC    Indications:  TPN    Procedure Details   Informed consent was obtained for the procedure, including sedation.  Risks of lung perforation, hemorrhage, and adverse drug reaction were discussed.     #5Fr PICC inserted to the R Basilic vein per hospital protocol.   Blood return:  yes    Findings:  Catheter inserted to 36 cm, with 1 cm. Exposed. Mid upper arm circumference is 29 cm.  Catheter was flushed with 20 cc NS. Patient did tolerate procedure well.    Recommendations:  Tip in distal SVC. Confirmed with 3CG. Okay to use.Handoff given to Breanna.  PICC Brochure given to patient with teaching instruction.

## 2024-10-31 NOTE — CARE COORDINATION
10/31/24 1203   Service Assessment   Patient Orientation Alert and Oriented   Cognition Alert   History Provided By Patient   Primary Caregiver Self   Accompanied By/Relationship Pt alone.   Support Systems Children;Family Members   Patient's Healthcare Decision Maker is: Legal Next of Kin   PCP Verified by CM Yes  (Demetrius Naila - seen 3 mos ago.)   Last Visit to PCP Within last 3 months   Prior Functional Level Independent in ADLs/IADLs;Assistance with the following:;Mobility  (Rollator)   Current Functional Level Independent in ADLs/IADLs;Assistance with the following:;Mobility  (Rollator)   Can patient return to prior living arrangement Yes   Ability to make needs known: Fair   Family able to assist with home care needs: Yes   Would you like for me to discuss the discharge plan with any other family members/significant others, and if so, who? Yes  (Daughter Salena Farrell)   Financial Resources Medicare   Community Resources None   CM/SW Referral Other (see comment)  (None)   Social/Functional History   Lives With Daughter   Type of Home House   Home Layout Two level;Able to Live on Main level with bedroom/bathroom   Home Access Stairs to enter with rails   Entrance Stairs - Number of Steps 5   Bathroom Shower/Tub Tub/Shower unit   Bathroom Toilet Standard   Bathroom Equipment None   Bathroom Accessibility Accessible   Home Equipment Rollator   Receives Help From Family   ADL Assistance Independent   Homemaking Assistance Independent   Homemaking Responsibilities Yes   Ambulation Assistance Needs assistance  (Rollator)   Transfer Assistance Independent   Active  No   Occupation Retired   Discharge Planning   Type of Residence House   Living Arrangements Children  (Lives with daughter.)   Current Services Prior To Admission None   Potential Assistance Needed N/A   DME Ordered? No   Potential Assistance Purchasing Medications No   Type of Home Care Services None   Patient expects to be discharged to:

## 2024-10-31 NOTE — PLAN OF CARE
Problem: Gastrointestinal - Adult  Goal: Minimal or absence of nausea and vomiting  Outcome: Progressing

## 2024-10-31 NOTE — PROGRESS NOTES
PROGRESS NOTE      Chief Complaints:  This is a postop follow-up.  HPI and  Objective:    Patient examined this morning the.  She looks comfortable.  NG tube output minimal.  Chest x-ray shows still significant hypoventilatory lung field.    Review of Systems:  Rest of review of system reviewed personally and they are negative.    EXAM:  BP (!) 156/71 Comment: Simultaneous filing. User may not have seen previous data.  Pulse 83 Comment: Simultaneous filing. User may not have seen previous data.  Temp 99.4 °F (37.4 °C) (Oral)   Resp 27 Comment: Simultaneous filing. User may not have seen previous data.  Ht 1.499 m (4' 11.02\")   Wt 73.7 kg (162 lb 7.7 oz)   SpO2 97%   BMI 32.80 kg/m²     Patient is awake   Head and neck atraumatic, normocephalic.  ENT: No hoarse voice, gaze appropriate.  Cardiac system regular rate rhythm.  Pulmonary no audible wheeze, no cyanosis.  Chest wall excursion normal with respiration cycle  Abdomen is soft not particularly distended.  Neurologically nonfocal.  Hematology system: No bruising noted.  Musculoskeletal system: No joint deformity noted.  Genitourinary system: No hematuria noted.  Skin is warm and moist.  Psychosocial: Cooperative.  Vascular examination as previously noted no changes.    Current Facility-Administered Medications   Medication Dose Route Frequency Provider Last Rate Last Admin    potassium chloride (KLOR-CON M) extended release tablet 40 mEq  40 mEq Oral PRN Kayla Orta APRN - CNP        Or    potassium chloride (KLOR-CON) 20 MEQ packet 40 mEq  40 mEq Oral PRN Kayla Orta APRN - CNP        Or    potassium chloride 10 mEq/100 mL IVPB (Peripheral Line)  10 mEq IntraVENous PRN Kayla Orta APRN - CNP        magnesium sulfate 2000 mg in 50 mL IVPB premix  2,000 mg IntraVENous PRN Kayla Orta APRN - CNP        sodium phosphate 11.79 mmol in sodium chloride 0.9 % 250 mL IVPB  0.16 mmol/kg IntraVENous PRN Kayla Orta APRN - CNP

## 2024-10-31 NOTE — PROGRESS NOTES
4 Eyes Skin Assessment     NAME:  Britta Quevedo  YOB: 1950  MEDICAL RECORD NUMBER:  [id number]    The patient is being assessed for  Transfer to New Unit    I agree that at least one RN has performed a thorough Head to Toe Skin Assessment on the patient. ALL assessment sites listed below have been assessed.      Areas assessed by both nurses:    Head, Face, Ears, Shoulders, Back, Chest, Arms, Elbows, Hands, Sacrum. Buttock, Coccyx, Ischium, Legs. Feet and Heels, and Under Medical Devices         Does the Patient have a Wound? No noted wound(s)       Celestine Prevention initiated by RN: Yes  Wound Care Orders initiated by RN: No    Pressure Injury (Stage 3,4, Unstageable, DTI, NWPT, and Complex wounds) if present, place Wound referral order by RN under : No    New Ostomies, if present place, Ostomy referral order under : No     Nurse 1 eSignature: Electronically signed by Vu Cullen RN on 10/31/24 at 6:22 PM EDT    **SHARE this note so that the co-signing nurse can place an eSignature**    Nurse 2 eSignature: Electronically signed by Breanna Huffman LPN on 10/31/24 at 6:57 PM EDT

## 2024-10-31 NOTE — H&P
Name: Britta Quevedo   : 1950   MRN: [id number]   Date: 10/30/2024      REASON FOR ICU ADMISSION:  Critical care management    PRINCIPLE ICU DIAGNOSIS   Hx of bowel perforation s/p resection and colostomy  Colostomy reversal with hernia repair 10/31  Post op pulmonary atelectasis  HLD  CKD    PATIENT SUMMARY   Britta Quevedo is a 74 y.o. female Hx of bowel perforation s/p resection and colostomy CKD, HLD, Arthritis, depression, HTN. Admitted for elective Colostomy reversal with hernia repair 10/31. Post procedure CXR noted to have atelectasis with elevated right diaphragm. She was admitted to the ICU for further management.     On exam, the patient is resting in bed in no apparent distress. She is on 2L NC. No respiratory distress. Pain is well controlled. Vitals are stable.      COMPREHENSIVE ASSESSMENT & PLAN:SYSTEM BASED       NEUROLOGICAL:     Alert and oriented  No focal deficits  Continue neuro checks per unit protocol    PULMONOLOGY:     Post op pulmonary insufficiency/atelectasis  On 2L NC spo2 100, plans to wean  CXR shows hypoventilation mild atelectasis with elevated right diaphragm.  Continue pulmonary hygiene, I/S, EZPAP,  and mobilize    Chronic elevated right diaphragm  Recommend SNIFF test as outpatient    CARDIOVASCULAR:     Hypertension  PRN hydralazine  Resume home BP meds once cleared by surgery    GASTROINTESTINAL     Hx of bowel perforation s/p resection and colostomy  Colostomy reversal with hernia repair 10/31  Management per surgery  NGT LIWS  Continue IVF  RENAL/ELECTROLYTE/FLUIDS:     Creat 0.95  Nonoliguric  Trend BMP    K 3.4, replace per protocol    ENDOCRINE:     Hyperglycemia  Low dose SSC  NO hx of DM  Hbg a1c    HEMATOLOGY/ONCOLOGY:    Chronic anemia  Baseline 8-10, Hbg 10.7  Trend      INFECTIOUS DISEASE:     On zosyn per surgery  Afebrile, WBC WNL    ICU DAILY CHECKLIST   Code Status:Full code  DVT Prophylaxis:recommend lovenox once cleared by surgery  T/L/D:   Tubes:

## 2024-10-31 NOTE — PROGRESS NOTES
Critical Care Progress Note    No complaints this morning.  Did's incentive spirometry overnight and earlier this morning.  No cough or dyspnea.  No chest pain.    Review of systems no nausea vomiting fever chills or sweats.  Past Medical History:   Diagnosis Date    Arthritis     Chronic kidney disease     Depression     Endometriosis     History of blood transfusion     Hyperlipemia     Prolonged emergence from general anesthesia         Current Facility-Administered Medications   Medication Dose Route Frequency Provider Last Rate Last Admin    potassium chloride (KLOR-CON M) extended release tablet 40 mEq  40 mEq Oral PRN Kayla Orta APRN - CNP        Or    potassium chloride (KLOR-CON) 20 MEQ packet 40 mEq  40 mEq Oral PRN Kayla Orta, APRN - CNP        Or    potassium chloride 10 mEq/100 mL IVPB (Peripheral Line)  10 mEq IntraVENous PRN Kayla Orta APRN - CNP        magnesium sulfate 2000 mg in 50 mL IVPB premix  2,000 mg IntraVENous PRN Kayla Orta APRN - CNP        sodium phosphate 11.79 mmol in sodium chloride 0.9 % 250 mL IVPB  0.16 mmol/kg IntraVENous PRN Kayla Orta APRN - CNP        Or    sodium phosphate 23.58 mmol in sodium chloride 0.9 % 250 mL IVPB  0.32 mmol/kg IntraVENous PRN Kayla Orta, APRN - CNP        famotidine (PEPCID) 20 mg in sodium chloride (PF) 0.9 % 10 mL injection  20 mg IntraVENous Daily Kayla Orta APRN - CNP   20 mg at 10/31/24 0815    ipratropium 0.5 mg-albuterol 2.5 mg (DUONEB) nebulizer solution 1 Dose  1 Dose Inhalation Q6H WA RT Usama Schulz MD   1 Dose at 10/31/24 1424    0.9% NaCl with KCl 40 mEq infusion   IntraVENous Continuous Usama Schulz MD 75 mL/hr at 10/31/24 1332 New Bag at 10/31/24 1332    scopolamine (TRANSDERM-SCOP) transdermal patch 1 patch  1 patch TransDERmal NOW Justyn Christopher MD   1 patch  infrequent.  Lower extremities showed mild distal leg edema.    Recent Labs     10/30/24  1841 10/31/24  0420   WBC 6.8 9.4   HGB 10.7* 9.8*   HCT 31.9* 29.8*    199     Recent Labs     10/30/24  1841 10/31/24  0645    142   K 3.4* 3.8   * 110*   CO2 21 21   BUN 17 20   ALT 31 41     No results for input(s): \"PH\", \"PCO2\", \"PO2\", \"HCO3\", \"FIO2\" in the last 72 hours.    XR CHEST PORTABLE   Final Result   No significant change. Low lung volumes.      Electronically signed by Yvan Estrada      XR CHEST PORTABLE   Final Result   1. Markedly hypoventilatory changes at the lung bases. Satisfactory position of   nasogastric tube      Electronically signed by Dana Darden        Assessment:  1.  Postoperative for colostomy reversal.  2.  Low lung volumes.  This seems to have been apparent on the chest x-ray over the past year.  No respiratory symptoms though.  No desaturation or dyspnea.  2.  She performed pulmonary GYN maneuvers overnight.    Plan:   1. continue pulmonology.    2. will be transferred to the floor.

## 2024-10-31 NOTE — OP NOTE
Operative Note      Patient: Britta Quevedo  YOB: 1950  MRN: [id number]    Date of Procedure: 10/30/2024    Pre-Op Diagnosis Codes:      * Ventral hernia without obstruction or gangrene [K43.9]     * Colostomy status ([facility]) [Z93.3]    Post-Op Diagnosis:  Right flank abdominal wall hernia defect at least 5 x 6 cm. ,  Appendix tip was mildly inflamed and thickened.       Procedure(s):  OPEN COLOSTOMY REVERSAL AND VENTRAL HERNIA REPAIR, appendectomy    #1 exploratory laparotomy.  2.  Colostomy reversal with EEA 29 mm device.  3.  Right flank abdominal wall ventral hernia repair with 4 x 6 inch ventral light mesh.  4.  Lysis radiation.  #5 interval appendectomy    Surgeon(s):  Berto Yen MD    Assistant:   * No surgical staff found *    Anesthesia: General    Estimated Blood Loss (mL): less than 100     Complications: None    Specimens:   ID Type Source Tests Collected by Time Destination   1 : appendix Tissue Appendix SURGICAL PATHOLOGY Berto Yen MD 10/30/2024 1445    2 : distal sigmoid colon stump Tissue Sigmoid Colon SURGICAL PATHOLOGY Berto Yen MD 10/30/2024 1532    3 : Sigmoid Colon distal Tissue Sigmoid Colon SURGICAL PATHOLOGY Berto Yen MD 10/30/2024 1550    A : montez urine Urine Urine, indwelling catheter CULTURE, URINE Berto Yen MD 10/30/2024 1446        Implants:  Implant Name Type Inv. Item Serial No.  Lot No. LRB No. Used Action   MESH SURG [id number] ELLIPSE [employer] VENTRALIGHT - SNA  MESH SURG [id number] ELLIPSE [employer] VENTRALIGHT NA BARD DAVOL-WD [id number] Right 1 Implanted         Drains:   Closed/Suction Drain Inferior Stomach Bulb (Active)   Site Description Clean, dry & intact 10/30/24 1900   Dressing Status Old drainage noted 10/31/24 0000   Drainage Appearance Bloody 10/31/24 0000   Drain Status Compressed;To bulb suction 10/31/24 0000   Output (ml) 50 ml 10/31/24 0000       NG/OG/NJ/NE Tube Nasogastric 18 fr Right nostril (Active)    Surrounding Skin Clean, dry & intact 10/31/24 0200   Securement device Tape 10/31/24 0200   Status Suction-low intermittent 10/31/24 0200   Placement Verified Respiratory Status;External Catheter Length 10/31/24 0200   NG/OG/NJ/NE External Measurement (cm) 60 cm 10/31/24 0200   Action Taken Placement verified (comment) 10/30/24 2000       External Urinary Catheter (Active)   Site Assessment Clean,dry & intact 10/31/24 0100   Placement Repositioned 10/31/24 0100   Perineal Care Yes 10/31/24 0100   Suction 40 mmgHg continuous 10/31/24 0100   Urine Color Yellow 10/31/24 0100   Urine Appearance Clear 10/31/24 0100   Output (mL) 150 mL 10/31/24 0100       [REMOVED] Colostomy LLQ (Removed)   Stomal Appliance 2 piece;Clean, dry & intact 10/30/24 1327   Stoma  Assessment Pink;Protrudes;Moist 10/30/24 1327   Stool Appearance Other (Comment) 10/30/24 1327       Findings:  Infection Present At Time Of Surgery (PATOS) (choose all levels that have infection present):  No infection present  Other Findings: As above    Detailed Description of Procedure:   Patient was brought to the operating table comfortably supine position.  Followed by general anesthesia was initiated.  Followed by patient was placed in lithotomy position.  Followed by patient's abdomen was prepped using Betadine solution.  Colostomy opening was closed with 3-0 Prolene suture.  After abdomen was prepped, sterile drapes were placed usual fashion.  At this time timeout was called after confirmation was made procedure commenced.  Generous midline abdominal incision was made using #10 blade and abdominal pressure was opened without difficulty.  Omentum was adherent to the abdominal wall tissues carefully  and divided.  Followed by multiple small bowel loops were noted adherent to the pelvic area this was carefully freed.  Hernia defect was examined.  Right flank abdominal wall has at least 5 x 6 cm abdominal wall defect.  Followed by abdominal wall was

## 2024-11-01 ENCOUNTER — APPOINTMENT (OUTPATIENT)
Facility: HOSPITAL | Age: 74
DRG: 329 | End: 2024-11-01
Attending: SURGERY
Payer: MEDICARE

## 2024-11-01 LAB
ALBUMIN SERPL-MCNC: 3 G/DL (ref 3.5–5)
ALBUMIN/GLOB SERPL: 1 (ref 1.1–2.2)
ALP SERPL-CCNC: 39 U/L (ref 45–117)
ALT SERPL-CCNC: 41 U/L (ref 12–78)
ANION GAP SERPL CALC-SCNC: 4 MMOL/L (ref 2–12)
ANION GAP SERPL CALC-SCNC: 9 MMOL/L (ref 2–12)
ARTERIAL PATENCY WRIST A: YES
AST SERPL W P-5'-P-CCNC: 45 U/L (ref 15–37)
BACTERIA SPEC CULT: NORMAL
BASE DEFICIT BLDA-SCNC: 2.8 MMOL/L
BASOPHILS # BLD: 0 K/UL (ref 0–0.1)
BASOPHILS # BLD: 0 K/UL (ref 0–0.1)
BASOPHILS NFR BLD: 0 % (ref 0–1)
BASOPHILS NFR BLD: 0 % (ref 0–1)
BDY SITE: ABNORMAL
BILIRUB SERPL-MCNC: 0.9 MG/DL (ref 0.2–1)
BODY TEMPERATURE: 98.1
BUN SERPL-MCNC: 16 MG/DL (ref 6–20)
BUN SERPL-MCNC: 18 MG/DL (ref 6–20)
BUN/CREAT SERPL: 17 (ref 12–20)
BUN/CREAT SERPL: 17 (ref 12–20)
CA-I BLD-MCNC: 8.6 MG/DL (ref 8.5–10.1)
CA-I BLD-MCNC: 9 MG/DL (ref 8.5–10.1)
CHLORIDE SERPL-SCNC: 116 MMOL/L (ref 97–108)
CHLORIDE SERPL-SCNC: 121 MMOL/L (ref 97–108)
CO2 SERPL-SCNC: 23 MMOL/L (ref 21–32)
CO2 SERPL-SCNC: 23 MMOL/L (ref 21–32)
COHGB MFR BLD: 0.3 % (ref 1–2)
CREAT SERPL-MCNC: 0.94 MG/DL (ref 0.55–1.02)
CREAT SERPL-MCNC: 1.06 MG/DL (ref 0.55–1.02)
DIFFERENTIAL METHOD BLD: ABNORMAL
DIFFERENTIAL METHOD BLD: ABNORMAL
EOSINOPHIL # BLD: 0 K/UL (ref 0–0.4)
EOSINOPHIL # BLD: 0 K/UL (ref 0–0.4)
EOSINOPHIL NFR BLD: 0 % (ref 0–7)
EOSINOPHIL NFR BLD: 0 % (ref 0–7)
ERYTHROCYTE [DISTWIDTH] IN BLOOD BY AUTOMATED COUNT: 15.2 % (ref 11.5–14.5)
ERYTHROCYTE [DISTWIDTH] IN BLOOD BY AUTOMATED COUNT: 15.4 % (ref 11.5–14.5)
FIO2 ON VENT: 32 %
GAS FLOW.O2 O2 DELIVERY SYS: 3 L/MIN
GLOBULIN SER CALC-MCNC: 3 G/DL (ref 2–4)
GLUCOSE BLD STRIP.AUTO-MCNC: 106 MG/DL (ref 65–100)
GLUCOSE BLD STRIP.AUTO-MCNC: 135 MG/DL (ref 65–100)
GLUCOSE BLD STRIP.AUTO-MCNC: 146 MG/DL (ref 65–100)
GLUCOSE SERPL-MCNC: 110 MG/DL (ref 65–100)
GLUCOSE SERPL-MCNC: 142 MG/DL (ref 65–100)
HCO3 BLDA-SCNC: 22 MMOL/L (ref 22–26)
HCT VFR BLD AUTO: 27.7 % (ref 35–47)
HCT VFR BLD AUTO: 27.7 % (ref 35–47)
HGB BLD-MCNC: 8.8 G/DL (ref 11.5–16)
HGB BLD-MCNC: 9 G/DL (ref 11.5–16)
IMM GRANULOCYTES # BLD AUTO: 0.1 K/UL (ref 0–0.04)
IMM GRANULOCYTES # BLD AUTO: 0.1 K/UL (ref 0–0.04)
IMM GRANULOCYTES NFR BLD AUTO: 1 % (ref 0–0.5)
IMM GRANULOCYTES NFR BLD AUTO: 1 % (ref 0–0.5)
LYMPHOCYTES # BLD: 0.3 K/UL (ref 0.8–3.5)
LYMPHOCYTES # BLD: 0.4 K/UL (ref 0.8–3.5)
LYMPHOCYTES NFR BLD: 3 % (ref 12–49)
LYMPHOCYTES NFR BLD: 4 % (ref 12–49)
Lab: NORMAL
MCH RBC QN AUTO: 28.9 PG (ref 26–34)
MCH RBC QN AUTO: 29.1 PG (ref 26–34)
MCHC RBC AUTO-ENTMCNC: 31.8 G/DL (ref 30–36.5)
MCHC RBC AUTO-ENTMCNC: 32.5 G/DL (ref 30–36.5)
MCV RBC AUTO: 89.6 FL (ref 80–99)
MCV RBC AUTO: 91.1 FL (ref 80–99)
METHGB MFR BLD: 0.5 % (ref 0–1.4)
MONOCYTES # BLD: 0.8 K/UL (ref 0–1)
MONOCYTES # BLD: 1 K/UL (ref 0–1)
MONOCYTES NFR BLD: 8 % (ref 5–13)
MONOCYTES NFR BLD: 9 % (ref 5–13)
NEUTS SEG # BLD: 8.6 K/UL (ref 1.8–8)
NEUTS SEG # BLD: 9.3 K/UL (ref 1.8–8)
NEUTS SEG NFR BLD: 87 % (ref 32–75)
NEUTS SEG NFR BLD: 87 % (ref 32–75)
NRBC # BLD: 0 K/UL (ref 0–0.01)
NRBC # BLD: 0 K/UL (ref 0–0.01)
NRBC BLD-RTO: 0 PER 100 WBC
NRBC BLD-RTO: 0 PER 100 WBC
OXYHGB MFR BLD: 95.7 % (ref 95–99)
PCO2 BLDA: 38 MMHG (ref 35–45)
PERFORMED BY:: ABNORMAL
PH BLDA: 7.38 (ref 7.35–7.45)
PLATELET # BLD AUTO: 191 K/UL (ref 150–400)
PLATELET # BLD AUTO: 193 K/UL (ref 150–400)
PMV BLD AUTO: 9.8 FL (ref 8.9–12.9)
PMV BLD AUTO: 9.9 FL (ref 8.9–12.9)
PO2 BLDA: 92 MMHG (ref 80–100)
POTASSIUM SERPL-SCNC: 3.7 MMOL/L (ref 3.5–5.1)
POTASSIUM SERPL-SCNC: 5.9 MMOL/L (ref 3.5–5.1)
PROT SERPL-MCNC: 6 G/DL (ref 6.4–8.2)
RBC # BLD AUTO: 3.04 M/UL (ref 3.8–5.2)
RBC # BLD AUTO: 3.09 M/UL (ref 3.8–5.2)
SAO2 % BLD: 97 % (ref 95–99)
SAO2% DEVICE SAO2% SENSOR NAME: ABNORMAL
SODIUM SERPL-SCNC: 148 MMOL/L (ref 136–145)
SODIUM SERPL-SCNC: 148 MMOL/L (ref 136–145)
SPECIMEN SITE: ABNORMAL
WBC # BLD AUTO: 10.6 K/UL (ref 3.6–11)
WBC # BLD AUTO: 9.9 K/UL (ref 3.6–11)

## 2024-11-01 PROCEDURE — 6360000002 HC RX W HCPCS: Performed by: SURGERY

## 2024-11-01 PROCEDURE — 94761 N-INVAS EAR/PLS OXIMETRY MLT: CPT

## 2024-11-01 PROCEDURE — 2580000003 HC RX 258: Performed by: NURSE PRACTITIONER

## 2024-11-01 PROCEDURE — 2580000003 HC RX 258: Performed by: SURGERY

## 2024-11-01 PROCEDURE — 71045 X-RAY EXAM CHEST 1 VIEW: CPT

## 2024-11-01 PROCEDURE — 2500000003 HC RX 250 WO HCPCS: Performed by: SURGERY

## 2024-11-01 PROCEDURE — 82803 BLOOD GASES ANY COMBINATION: CPT

## 2024-11-01 PROCEDURE — 1100000000 HC RM PRIVATE

## 2024-11-01 PROCEDURE — 85025 COMPLETE CBC W/AUTO DIFF WBC: CPT

## 2024-11-01 PROCEDURE — 94669 MECHANICAL CHEST WALL OSCILL: CPT

## 2024-11-01 PROCEDURE — 94640 AIRWAY INHALATION TREATMENT: CPT

## 2024-11-01 PROCEDURE — 36415 COLL VENOUS BLD VENIPUNCTURE: CPT

## 2024-11-01 PROCEDURE — 80053 COMPREHEN METABOLIC PANEL: CPT

## 2024-11-01 PROCEDURE — 36600 WITHDRAWAL OF ARTERIAL BLOOD: CPT

## 2024-11-01 PROCEDURE — 6370000000 HC RX 637 (ALT 250 FOR IP): Performed by: INTERNAL MEDICINE

## 2024-11-01 PROCEDURE — 82962 GLUCOSE BLOOD TEST: CPT

## 2024-11-01 PROCEDURE — 2500000003 HC RX 250 WO HCPCS: Performed by: NURSE PRACTITIONER

## 2024-11-01 PROCEDURE — 2700000000 HC OXYGEN THERAPY PER DAY

## 2024-11-01 PROCEDURE — 97161 PT EVAL LOW COMPLEX 20 MIN: CPT

## 2024-11-01 PROCEDURE — 6360000002 HC RX W HCPCS: Performed by: INTERNAL MEDICINE

## 2024-11-01 PROCEDURE — 80048 BASIC METABOLIC PNL TOTAL CA: CPT

## 2024-11-01 PROCEDURE — 97530 THERAPEUTIC ACTIVITIES: CPT

## 2024-11-01 PROCEDURE — 99024 POSTOP FOLLOW-UP VISIT: CPT | Performed by: SURGERY

## 2024-11-01 RX ORDER — ALBUTEROL SULFATE 2.5 MG/.5ML
2.5 SOLUTION RESPIRATORY (INHALATION) EVERY 6 HOURS PRN
Status: DISCONTINUED | OUTPATIENT
Start: 2024-11-01 | End: 2024-11-06 | Stop reason: HOSPADM

## 2024-11-01 RX ORDER — NALOXONE HYDROCHLORIDE 0.4 MG/ML
0.2 INJECTION, SOLUTION INTRAMUSCULAR; INTRAVENOUS; SUBCUTANEOUS ONCE
Status: DISCONTINUED | OUTPATIENT
Start: 2024-11-01 | End: 2024-11-06 | Stop reason: HOSPADM

## 2024-11-01 RX ORDER — ENOXAPARIN SODIUM 100 MG/ML
40 INJECTION SUBCUTANEOUS DAILY
Status: DISCONTINUED | OUTPATIENT
Start: 2024-11-01 | End: 2024-11-06 | Stop reason: HOSPADM

## 2024-11-01 RX ORDER — MORPHINE SULFATE 2 MG/ML
1 INJECTION, SOLUTION INTRAMUSCULAR; INTRAVENOUS EVERY 4 HOURS PRN
Status: DISPENSED | OUTPATIENT
Start: 2024-11-01 | End: 2024-11-03

## 2024-11-01 RX ORDER — HYDROMORPHONE HYDROCHLORIDE 1 MG/ML
1 INJECTION, SOLUTION INTRAMUSCULAR; INTRAVENOUS; SUBCUTANEOUS EVERY 4 HOURS PRN
Status: DISCONTINUED | OUTPATIENT
Start: 2024-11-01 | End: 2024-11-01

## 2024-11-01 RX ORDER — FUROSEMIDE 10 MG/ML
20 INJECTION INTRAMUSCULAR; INTRAVENOUS ONCE
Status: COMPLETED | OUTPATIENT
Start: 2024-11-01 | End: 2024-11-01

## 2024-11-01 RX ORDER — KETOROLAC TROMETHAMINE 15 MG/ML
15 INJECTION, SOLUTION INTRAMUSCULAR; INTRAVENOUS EVERY 6 HOURS PRN
Status: DISPENSED | OUTPATIENT
Start: 2024-11-01 | End: 2024-11-03

## 2024-11-01 RX ORDER — SODIUM CHLORIDE 9 MG/ML
INJECTION, SOLUTION INTRAVENOUS CONTINUOUS
Status: DISCONTINUED | OUTPATIENT
Start: 2024-11-01 | End: 2024-11-02

## 2024-11-01 RX ORDER — HYDROMORPHONE HYDROCHLORIDE 1 MG/ML
0.5 INJECTION, SOLUTION INTRAMUSCULAR; INTRAVENOUS; SUBCUTANEOUS
Status: DISCONTINUED | OUTPATIENT
Start: 2024-11-01 | End: 2024-11-01

## 2024-11-01 RX ADMIN — KETOROLAC TROMETHAMINE 15 MG: 15 INJECTION, SOLUTION INTRAMUSCULAR; INTRAVENOUS at 22:49

## 2024-11-01 RX ADMIN — FUROSEMIDE 20 MG: 10 INJECTION, SOLUTION INTRAMUSCULAR; INTRAVENOUS at 22:48

## 2024-11-01 RX ADMIN — SODIUM CHLORIDE, PRESERVATIVE FREE 20 MG: 5 INJECTION INTRAVENOUS at 08:30

## 2024-11-01 RX ADMIN — HYDROMORPHONE HYDROCHLORIDE 1 MG: 1 INJECTION, SOLUTION INTRAMUSCULAR; INTRAVENOUS; SUBCUTANEOUS at 08:28

## 2024-11-01 RX ADMIN — HYDROMORPHONE HYDROCHLORIDE 1 MG: 1 INJECTION, SOLUTION INTRAMUSCULAR; INTRAVENOUS; SUBCUTANEOUS at 13:25

## 2024-11-01 RX ADMIN — PIPERACILLIN AND TAZOBACTAM 3375 MG: 3; .375 INJECTION, POWDER, LYOPHILIZED, FOR SOLUTION INTRAVENOUS at 01:47

## 2024-11-01 RX ADMIN — IPRATROPIUM BROMIDE AND ALBUTEROL SULFATE 1 DOSE: .5; 2.5 SOLUTION RESPIRATORY (INHALATION) at 14:00

## 2024-11-01 RX ADMIN — SODIUM CHLORIDE, PRESERVATIVE FREE 10 ML: 5 INJECTION INTRAVENOUS at 21:14

## 2024-11-01 RX ADMIN — SODIUM CHLORIDE: 9 INJECTION, SOLUTION INTRAVENOUS at 21:10

## 2024-11-01 RX ADMIN — PIPERACILLIN AND TAZOBACTAM 3375 MG: 3; .375 INJECTION, POWDER, LYOPHILIZED, FOR SOLUTION INTRAVENOUS at 10:17

## 2024-11-01 RX ADMIN — POTASSIUM CHLORIDE AND SODIUM CHLORIDE: 900; 300 INJECTION, SOLUTION INTRAVENOUS at 01:52

## 2024-11-01 RX ADMIN — IPRATROPIUM BROMIDE AND ALBUTEROL SULFATE 1 DOSE: .5; 2.5 SOLUTION RESPIRATORY (INHALATION) at 08:04

## 2024-11-01 RX ADMIN — IPRATROPIUM BROMIDE AND ALBUTEROL SULFATE 1 DOSE: .5; 2.5 SOLUTION RESPIRATORY (INHALATION) at 19:16

## 2024-11-01 RX ADMIN — SODIUM CHLORIDE, PRESERVATIVE FREE 10 ML: 5 INJECTION INTRAVENOUS at 08:32

## 2024-11-01 RX ADMIN — PIPERACILLIN AND TAZOBACTAM 3375 MG: 3; .375 INJECTION, POWDER, LYOPHILIZED, FOR SOLUTION INTRAVENOUS at 18:13

## 2024-11-01 RX ADMIN — ENOXAPARIN SODIUM 40 MG: 100 INJECTION SUBCUTANEOUS at 08:31

## 2024-11-01 ASSESSMENT — PAIN DESCRIPTION - ORIENTATION
ORIENTATION: LEFT
ORIENTATION: ANTERIOR

## 2024-11-01 ASSESSMENT — PAIN SCALES - GENERAL
PAINLEVEL_OUTOF10: 9
PAINLEVEL_OUTOF10: 6
PAINLEVEL_OUTOF10: 8
PAINLEVEL_OUTOF10: 1
PAINLEVEL_OUTOF10: 5
PAINLEVEL_OUTOF10: 9
PAINLEVEL_OUTOF10: 2
PAINLEVEL_OUTOF10: 0

## 2024-11-01 ASSESSMENT — PAIN - FUNCTIONAL ASSESSMENT
PAIN_FUNCTIONAL_ASSESSMENT: ACTIVITIES ARE NOT PREVENTED
PAIN_FUNCTIONAL_ASSESSMENT: ACTIVITIES ARE NOT PREVENTED

## 2024-11-01 ASSESSMENT — PAIN DESCRIPTION - LOCATION
LOCATION: ABDOMEN
LOCATION: ABDOMEN

## 2024-11-01 ASSESSMENT — PAIN SCALES - WONG BAKER
WONGBAKER_NUMERICALRESPONSE: NO HURT
WONGBAKER_NUMERICALRESPONSE: NO HURT

## 2024-11-01 ASSESSMENT — PAIN DESCRIPTION - DESCRIPTORS
DESCRIPTORS: ACHING
DESCRIPTORS: ACHING

## 2024-11-01 NOTE — PLAN OF CARE
Problem: Pain  Goal: Verbalizes/displays adequate comfort level or baseline comfort level  Outcome: Progressing  Flowsheets (Taken 10/30/2024 2000 by Yolanda Manuel RN)  Verbalizes/displays adequate comfort level or baseline comfort level:   Assess pain using appropriate pain scale   Administer analgesics based on type and severity of pain and evaluate response   Implement non-pharmacological measures as appropriate and evaluate response   Notify Licensed Independent Practitioner if interventions unsuccessful or patient reports new pain

## 2024-11-01 NOTE — PLAN OF CARE
Problem: Pain  Goal: Verbalizes/displays adequate comfort level or baseline comfort level  11/1/2024 1103 by ALIREZA Sousa, RN  Outcome: Progressing  11/1/2024 0055 by Pranav Waite, RN  Outcome: Progressing  Flowsheets (Taken 10/30/2024 2000 by Yolanda Manuel, RN)  Verbalizes/displays adequate comfort level or baseline comfort level:   Assess pain using appropriate pain scale   Administer analgesics based on type and severity of pain and evaluate response   Implement non-pharmacological measures as appropriate and evaluate response   Notify Licensed Independent Practitioner if interventions unsuccessful or patient reports new pain     Problem: ABCDS Injury Assessment  Goal: Absence of physical injury  Outcome: Progressing     Problem: Safety - Adult  Goal: Free from fall injury  Outcome: Progressing     Problem: Discharge Planning  Goal: Discharge to home or other facility with appropriate resources  Outcome: Progressing     Problem: Skin/Tissue Integrity - Adult  Goal: Skin integrity remains intact  Outcome: Progressing     Problem: Gastrointestinal - Adult  Goal: Minimal or absence of nausea and vomiting  Outcome: Progressing

## 2024-11-01 NOTE — PLAN OF CARE
goals.  Description: FUNCTIONAL STATUS PRIOR TO ADMISSION: Patient was modified independent using a rolling walker/NO AD for functional mobility.    HOME SUPPORT PRIOR TO ADMISSION: Patient lives with daughter and was indep at home    Physical Therapy Goals  Initiated 11/1/2024  Pt stated goal: Get better  Pt will be I with LE HEP in 7 days.  Pt will perform bed mobility with Modified Kings in 7 days.  Pt will perform transfers with Modified Kings in 7 days.   Pt will amb 150 feet with LRAD safely with Modified Kings in 7 days.    Pt will verbalize and demonstrate compliance with fall/surgical precautions in 7 days.   Pt will demonstrate improvement in standing/gait balance from fair to good in 7 days.    Outcome: Progressing        PLAN :  Recommendations and Planned Interventions: bed mobility training, transfer training, gait training, therapeutic exercises, patient and family training/education, and therapeutic activities    Recommend next PT session: EOB transfers, seated dynamic balance, standing dynamic balance, ambulation with AD , and LE therex    Frequency/Duration: Patient will be followed by physical therapy:  3-5x/week to address goals.    Recommendation for discharge: (in order for the patient to meet his/her long term goals)  Intermittent physical therapy up to 2-3x/week in previous living setting     Potential barriers for safe discharge: pt has poor safety awareness, pt is a high fall risk, and pt is not safe to be alone.    IF patient discharges home will need the following DME: continuing to assess with progress       SUBJECTIVE:   Patient stated “I am ok.”    OBJECTIVE DATA SUMMARY:   HISTORY:    Past Medical History:   Diagnosis Date    Arthritis     Chronic kidney disease     Depression     Endometriosis     History of blood transfusion     Hyperlipemia     Prolonged emergence from general anesthesia      Past Surgical History:   Procedure Laterality Date    BACK SURGERY    Outcome: Verbalized understanding;Continued education needed         Thank you for this referral.  Cynthia Easton, PT  Minutes: 20

## 2024-11-01 NOTE — PROGRESS NOTES
Spiritual Health History and Assessment/Progress Note  [facility]    Initial Encounter,  ,  ,      Name: Britta Quevedo MRN: [id number]    Age: 74 y.o.     Sex: female   Language: English   Presybeterian: Spiritism   Colostomy status ([facility])     Date: 11/1/2024            Total Time Calculated: 22 min              Spiritual Assessment began in [facility] SURGICAL        Referral/Consult From: Rounding   Encounter Overview/Reason: Initial Encounter  Service Provided For: Patient    Sarika, Belief, Meaning:   Patient is connected with a sarika tradition or spiritual practice  Family/Friends No family/friends present      Importance and Influence:  Patient has no beliefs influential to healthcare decision-making identified during this visit  Family/Friends No family/friends present    Community:  Patient feels well-supported. Support system includes: Children and Extended family  Family/Friends No family/friends present    Assessment and Plan of Care:     Patient Interventions include: Facilitated expression of thoughts and feelings, Explored spiritual coping/struggle/distress, Affirmed coping skills/support systems, Facilitated life review and/ or legacy, and Other: Ministry of presence, active listening, and comfort.   Family/Friends Interventions include: No family/friends present    Patient Plan of Care: Spiritual Care available upon further referral  Family/Friends Plan of Care: No family/friends present     visited with Pt. She was exhausted and coping through the pain from her treatment. She identified as Jehovah's witness (Spiritism). She feels well supported by her children and grandchildren.  provided ministry of presence, active listening and emotional support.     Electronically signed by ARDEN TYLER, Chaplain Resident on 11/1/2024 at 12:15 PM

## 2024-11-01 NOTE — PROGRESS NOTES
OT eval order received and acknowledged. OT eval attempted at 1438 however Ms Emy was sleeping after given Dilaudid earlier this PM. Will continue to follow patient and attempt OT eval at a later time. Thank you.

## 2024-11-01 NOTE — PROGRESS NOTES
RRT Clinical Rounding Nurse Progress Report      SUBJECTIVE: Patient assessed secondary to elevated Deterioration Index Score.      Vitals:    11/01/24 1411 11/01/24 1557 11/01/24 1918 11/01/24 1952   BP:  108/77  (!) 135/59   Pulse:  83  94   Resp:  18  20   Temp:    98.1 °F (36.7 °C)   TempSrc:  Temporal  Oral   SpO2: 95% 100% 99% 96%   Weight:       Height:            DETERIORATION INDEX SCORE: 51    ASSESSMENT:      PLAN:  Spoke to Ne about DI score of 51, primary RN to assess bedside.    Cammie Riggs RN

## 2024-11-01 NOTE — PROGRESS NOTES
PROGRESS NOTE      Chief Complaints:  Patient examined this morning.  HPI and  Objective:    She looks comfortable.  Pain is moderate.  No fever.  NG tube output minimal.  Abdomen soft mildly distended.  Wounds are clean.  Review of Systems:  Rest of review of system reviewed personally and they are negative.    EXAM:  /67   Pulse 89   Temp 97.4 °F (36.3 °C) (Oral)   Resp 18   Ht 1.499 m (4' 11.02\")   Wt 73.7 kg (162 lb 7.7 oz)   SpO2 96%   BMI 32.80 kg/m²     Patient is awake   Head and neck atraumatic, normocephalic.  ENT: No hoarse voice, gaze appropriate.  Cardiac system regular rate rhythm.  Pulmonary no audible wheeze, no cyanosis.  Chest wall excursion normal with respiration cycle  Abdomen is soft not particularly distended.  Neurologically nonfocal.  Hematology system: No bruising noted.  Musculoskeletal system: No joint deformity noted.  Genitourinary system: No hematuria noted.  Skin is warm and moist.  Psychosocial: Cooperative.  Vascular examination as previously noted no changes.    Current Facility-Administered Medications   Medication Dose Route Frequency Provider Last Rate Last Admin    potassium chloride (KLOR-CON M) extended release tablet 40 mEq  40 mEq Oral PRN Kayla Orta, APRN - CNP        Or    potassium chloride (KLOR-CON) 20 MEQ packet 40 mEq  40 mEq Oral PRN Kayla Orta, APRN - CNP        Or    potassium chloride 10 mEq/100 mL IVPB (Peripheral Line)  10 mEq IntraVENous PRN Kayla Orta, APRN - CNP        magnesium sulfate 2000 mg in 50 mL IVPB premix  2,000 mg IntraVENous PRN Kayla Orta, APRN - CNP        sodium phosphate 11.79 mmol in sodium chloride 0.9 % 250 mL IVPB  0.16 mmol/kg IntraVENous PRN Kayla Orta, APRN - CNP        Or    sodium phosphate 23.58 mmol in sodium chloride 0.9 % 250 mL IVPB  0.32 mmol/kg IntraVENous PRN Kayla Orta, APRN - CNP        famotidine (PEPCID) 20 mg in sodium chloride (PF) 0.9 % 10 mL injection  20 mg  CNP   1 Units at 10/31/24 1315           Recent Results (from the past 24 hour(s))   POCT Glucose    Collection Time: 10/31/24 12:19 PM   Result Value Ref Range    POC Glucose 195 (H) 65 - 100 mg/dL    Performed by: Devora Noguera    POCT Glucose    Collection Time: 10/31/24  7:49 PM   Result Value Ref Range    POC Glucose 137 (H) 65 - 100 mg/dL    Performed by: Riddhi Benitez    POCT Glucose    Collection Time: 10/31/24 11:35 PM   Result Value Ref Range    POC Glucose 137 (H) 65 - 100 mg/dL    Performed by: JOSE ELIAS Rock RN    POCT Glucose    Collection Time: 11/01/24  5:45 AM   Result Value Ref Range    POC Glucose 135 (H) 65 - 100 mg/dL    Performed by: JOSE ELIAS Rock RN        ASSESSMENT:   Patient is 74 y.o. with diagnosis of : Principal Problem:    Colostomy status (HCC)  Active Problems:    Ventral hernia without obstruction or gangrene  Resolved Problems:    * No resolved hospital problems. *      PLAN:                 Will change iodoform packing changes colostomy closure site starting tomorrow.    Chest x-ray yesterday bibasilar atelectasis and hypoventilatory lung has not improved.    Continue aggressive pulmonary toiletry incentive spirometry hourly.    Continue n.p.o. NG tube until GI function normalizes.    Patient can be out of bed today.    Chest x-ray pending this morning.  Labs are pending this morning.    Continue postop course.

## 2024-11-02 LAB
ALBUMIN SERPL-MCNC: 2.8 G/DL (ref 3.5–5)
ALBUMIN/GLOB SERPL: 0.8 (ref 1.1–2.2)
ALP SERPL-CCNC: 53 U/L (ref 45–117)
ALT SERPL-CCNC: 36 U/L (ref 12–78)
ANION GAP SERPL CALC-SCNC: 5 MMOL/L (ref 2–12)
AST SERPL W P-5'-P-CCNC: 38 U/L (ref 15–37)
BILIRUB SERPL-MCNC: 0.8 MG/DL (ref 0.2–1)
BUN SERPL-MCNC: 16 MG/DL (ref 6–20)
BUN/CREAT SERPL: 16 (ref 12–20)
CA-I BLD-MCNC: 9 MG/DL (ref 8.5–10.1)
CHLORIDE SERPL-SCNC: 117 MMOL/L (ref 97–108)
CO2 SERPL-SCNC: 26 MMOL/L (ref 21–32)
CREAT SERPL-MCNC: 0.97 MG/DL (ref 0.55–1.02)
GLOBULIN SER CALC-MCNC: 3.4 G/DL (ref 2–4)
GLUCOSE BLD STRIP.AUTO-MCNC: 116 MG/DL (ref 65–100)
GLUCOSE BLD STRIP.AUTO-MCNC: 117 MG/DL (ref 65–100)
GLUCOSE BLD STRIP.AUTO-MCNC: 127 MG/DL (ref 65–100)
GLUCOSE BLD STRIP.AUTO-MCNC: 94 MG/DL (ref 65–100)
GLUCOSE SERPL-MCNC: 103 MG/DL (ref 65–100)
PERFORMED BY:: ABNORMAL
PERFORMED BY:: NORMAL
POTASSIUM SERPL-SCNC: 3.5 MMOL/L (ref 3.5–5.1)
PROT SERPL-MCNC: 6.2 G/DL (ref 6.4–8.2)
SODIUM SERPL-SCNC: 148 MMOL/L (ref 136–145)

## 2024-11-02 PROCEDURE — 82962 GLUCOSE BLOOD TEST: CPT

## 2024-11-02 PROCEDURE — 36415 COLL VENOUS BLD VENIPUNCTURE: CPT

## 2024-11-02 PROCEDURE — 2500000003 HC RX 250 WO HCPCS: Performed by: NURSE PRACTITIONER

## 2024-11-02 PROCEDURE — 6370000000 HC RX 637 (ALT 250 FOR IP): Performed by: INTERNAL MEDICINE

## 2024-11-02 PROCEDURE — 6360000002 HC RX W HCPCS: Performed by: SURGERY

## 2024-11-02 PROCEDURE — 2580000003 HC RX 258: Performed by: SURGERY

## 2024-11-02 PROCEDURE — 94669 MECHANICAL CHEST WALL OSCILL: CPT

## 2024-11-02 PROCEDURE — 94761 N-INVAS EAR/PLS OXIMETRY MLT: CPT

## 2024-11-02 PROCEDURE — 80053 COMPREHEN METABOLIC PANEL: CPT

## 2024-11-02 PROCEDURE — 2500000003 HC RX 250 WO HCPCS: Performed by: HOSPITALIST

## 2024-11-02 PROCEDURE — 1100000000 HC RM PRIVATE

## 2024-11-02 PROCEDURE — 94640 AIRWAY INHALATION TREATMENT: CPT

## 2024-11-02 PROCEDURE — 2580000003 HC RX 258: Performed by: NURSE PRACTITIONER

## 2024-11-02 PROCEDURE — 2700000000 HC OXYGEN THERAPY PER DAY

## 2024-11-02 PROCEDURE — 6360000002 HC RX W HCPCS: Performed by: PHYSICIAN ASSISTANT

## 2024-11-02 RX ORDER — DEXTROSE MONOHYDRATE, SODIUM CHLORIDE, AND POTASSIUM CHLORIDE 50; .745; 4.5 G/1000ML; G/1000ML; G/1000ML
INJECTION, SOLUTION INTRAVENOUS CONTINUOUS
Status: DISCONTINUED | OUTPATIENT
Start: 2024-11-02 | End: 2024-11-04

## 2024-11-02 RX ADMIN — POTASSIUM CHLORIDE, DEXTROSE MONOHYDRATE AND SODIUM CHLORIDE: 75; 5; 450 INJECTION, SOLUTION INTRAVENOUS at 14:01

## 2024-11-02 RX ADMIN — SODIUM CHLORIDE, PRESERVATIVE FREE 10 ML: 5 INJECTION INTRAVENOUS at 20:59

## 2024-11-02 RX ADMIN — ENOXAPARIN SODIUM 40 MG: 100 INJECTION SUBCUTANEOUS at 09:58

## 2024-11-02 RX ADMIN — KETOROLAC TROMETHAMINE 15 MG: 15 INJECTION, SOLUTION INTRAMUSCULAR; INTRAVENOUS at 20:58

## 2024-11-02 RX ADMIN — PIPERACILLIN AND TAZOBACTAM 3375 MG: 3; .375 INJECTION, POWDER, LYOPHILIZED, FOR SOLUTION INTRAVENOUS at 10:02

## 2024-11-02 RX ADMIN — IPRATROPIUM BROMIDE AND ALBUTEROL SULFATE 1 DOSE: .5; 2.5 SOLUTION RESPIRATORY (INHALATION) at 14:39

## 2024-11-02 RX ADMIN — IPRATROPIUM BROMIDE AND ALBUTEROL SULFATE 1 DOSE: .5; 2.5 SOLUTION RESPIRATORY (INHALATION) at 08:27

## 2024-11-02 RX ADMIN — MORPHINE SULFATE 1 MG: 2 INJECTION, SOLUTION INTRAMUSCULAR; INTRAVENOUS at 14:02

## 2024-11-02 RX ADMIN — PIPERACILLIN AND TAZOBACTAM 3375 MG: 3; .375 INJECTION, POWDER, LYOPHILIZED, FOR SOLUTION INTRAVENOUS at 01:57

## 2024-11-02 RX ADMIN — PIPERACILLIN AND TAZOBACTAM 3375 MG: 3; .375 INJECTION, POWDER, LYOPHILIZED, FOR SOLUTION INTRAVENOUS at 17:52

## 2024-11-02 RX ADMIN — MORPHINE SULFATE 1 MG: 2 INJECTION, SOLUTION INTRAMUSCULAR; INTRAVENOUS at 09:57

## 2024-11-02 RX ADMIN — SODIUM CHLORIDE, PRESERVATIVE FREE 10 ML: 5 INJECTION INTRAVENOUS at 10:43

## 2024-11-02 RX ADMIN — IPRATROPIUM BROMIDE AND ALBUTEROL SULFATE 1 DOSE: .5; 2.5 SOLUTION RESPIRATORY (INHALATION) at 19:51

## 2024-11-02 RX ADMIN — SODIUM CHLORIDE: 9 INJECTION, SOLUTION INTRAVENOUS at 05:58

## 2024-11-02 RX ADMIN — SODIUM CHLORIDE, PRESERVATIVE FREE 20 MG: 5 INJECTION INTRAVENOUS at 10:42

## 2024-11-02 RX ADMIN — MORPHINE SULFATE 1 MG: 2 INJECTION, SOLUTION INTRAMUSCULAR; INTRAVENOUS at 05:20

## 2024-11-02 ASSESSMENT — PAIN SCALES - GENERAL
PAINLEVEL_OUTOF10: 7
PAINLEVEL_OUTOF10: 5
PAINLEVEL_OUTOF10: 6
PAINLEVEL_OUTOF10: 6
PAINLEVEL_OUTOF10: 8
PAINLEVEL_OUTOF10: 1
PAINLEVEL_OUTOF10: 10
PAINLEVEL_OUTOF10: 8

## 2024-11-02 ASSESSMENT — PAIN DESCRIPTION - DESCRIPTORS
DESCRIPTORS: ACHING

## 2024-11-02 ASSESSMENT — PAIN - FUNCTIONAL ASSESSMENT
PAIN_FUNCTIONAL_ASSESSMENT: ACTIVITIES ARE NOT PREVENTED
PAIN_FUNCTIONAL_ASSESSMENT: PREVENTS OR INTERFERES SOME ACTIVE ACTIVITIES AND ADLS
PAIN_FUNCTIONAL_ASSESSMENT: ACTIVITIES ARE NOT PREVENTED
PAIN_FUNCTIONAL_ASSESSMENT: PREVENTS OR INTERFERES SOME ACTIVE ACTIVITIES AND ADLS

## 2024-11-02 ASSESSMENT — PAIN DESCRIPTION - LOCATION
LOCATION: ABDOMEN

## 2024-11-02 ASSESSMENT — PAIN DESCRIPTION - ORIENTATION
ORIENTATION: ANTERIOR
ORIENTATION: ANTERIOR
ORIENTATION: LEFT
ORIENTATION: ANTERIOR

## 2024-11-02 ASSESSMENT — PAIN SCALES - WONG BAKER
WONGBAKER_NUMERICALRESPONSE: NO HURT
WONGBAKER_NUMERICALRESPONSE: NO HURT

## 2024-11-02 NOTE — PROGRESS NOTES
Hospitalist Progress Note    NAME:   Britta Quevedo   : 1950   MRN: [id number]     Subjective:   Daily Progress Note:  2024    Chief complaint:No chief complaint on file.  Medical consult follow-up for hypoxia      Hospital course to date/HPI from H&P:  Britta Quevedo is a 74 y.o.  female with PMHx significant for bowel perf during colonoscopy requiring sigmoid colon resection and colostomy placement on 2024, CKD stage 3A, depression, HLD, HTN, rheumatoid arthritis, and DM controlled with diet admitted on 10/30 post-op elective colostomy reversal and ventral hernia repair.  Patient was admitted to ICU following procedure for postop pulmonary respiratory distress and hypoxia. She was transferred out of ICU on 10/31. She is n.p.o. with NG tube in place until GI function normalizes.  Patient states that she is urinating but has not yet had a bowel movement.  Surgical service has requested hospitalist evaluation for medical management.  Patient was found to have hyperkalemia although repeat lab was normal.       24-patient transferred out of ICU yesterday evening, currently comfortable in bed.  Has NG tube.  No acute issues reported to me by patient or staff.      Objective:     /65   Pulse 81   Temp 97.9 °F (36.6 °C)   Resp 18   Ht 1.499 m (4' 11.02\")   Wt 73.7 kg (162 lb 7.7 oz)   SpO2 100%   BMI 32.80 kg/m²  O2 Flow Rate (L/min): 3 L/min      Temp (24hrs), Av.9 °F (36.6 °C), Min:97.5 °F (36.4 °C), Max:98.2 °F (36.8 °C)        PHYSICAL EXAM:  Gen WDWN  Neck Supple  CVS RRR  Resp Symmetric expansion  Abdomen soft,  DANIEL drain and dressings  Ext moves all  Neuro Alert    Psych Normal Affect  Skin no visible Rash        Data Review:      Recent Labs     10/31/24  0420 24  1011 24  2130   WBC 9.4 10.6 9.9   HGB 9.8* 8.8* 9.0*   HCT 29.8* 27.7* 27.7*    193 191     Recent Labs     10/31/24  0645 24  1011 24  2130 24  0908    148* 148*  148*   K 3.8 5.9* 3.7 3.5   * 121* 116* 117*   CO2 21 23 23 26   GLUCOSE 278* 142* 110* 103*   BUN 20 18 16 16   CREATININE 1.47* 1.06* 0.94 0.97   CALCIUM 9.2 8.6 9.0 9.0   BILITOT 0.9 0.9  --  0.8   AST 35 45*  --  38*   ALT 41 41  --  36     Recent Results (from the past 18 hour(s))   CBC with Auto Differential    Collection Time: 11/01/24  9:30 PM   Result Value Ref Range    WBC 9.9 3.6 - 11.0 K/uL    RBC 3.09 (L) 3.80 - 5.20 M/uL    Hemoglobin 9.0 (L) 11.5 - 16.0 g/dL    Hematocrit 27.7 (L) 35.0 - 47.0 %    MCV 89.6 80.0 - 99.0 FL    MCH 29.1 26.0 - 34.0 PG    MCHC 32.5 30.0 - 36.5 g/dL    RDW 15.2 (H) 11.5 - 14.5 %    Platelets 191 150 - 400 K/uL    MPV 9.8 8.9 - 12.9 FL    Nucleated RBCs 0.0 0.0  WBC    nRBC 0.00 0.00 - 0.01 K/uL    Neutrophils % 87 (H) 32 - 75 %    Lymphocytes % 4 (L) 12 - 49 %    Monocytes % 8 5 - 13 %    Eosinophils % 0 0 - 7 %    Basophils % 0 0 - 1 %    Immature Granulocytes % 1 (H) 0 - 0.5 %    Neutrophils Absolute 8.6 (H) 1.8 - 8.0 K/UL    Lymphocytes Absolute 0.4 (L) 0.8 - 3.5 K/UL    Monocytes Absolute 0.8 0.0 - 1.0 K/UL    Eosinophils Absolute 0.0 0.0 - 0.4 K/UL    Basophils Absolute 0.0 0.0 - 0.1 K/UL    Immature Granulocytes Absolute 0.1 (H) 0.00 - 0.04 K/UL    Differential Type AUTOMATED     Basic Metabolic Panel    Collection Time: 11/01/24  9:30 PM   Result Value Ref Range    Sodium 148 (H) 136 - 145 mmol/L    Potassium 3.7 3.5 - 5.1 mmol/L    Chloride 116 (H) 97 - 108 mmol/L    CO2 23 21 - 32 mmol/L    Anion Gap 9 2 - 12 mmol/L    Glucose 110 (H) 65 - 100 mg/dL    BUN 16 6 - 20 mg/dL    Creatinine 0.94 0.55 - 1.02 mg/dL    BUN/Creatinine Ratio 17 12 - 20      Est, Glom Filt Rate 64 >60 ml/min/1.73m2    Calcium 9.0 8.5 - 10.1 mg/dL   Blood Gas, Arterial    Collection Time: 11/01/24  9:31 PM   Result Value Ref Range    pH, Arterial 7.38 7.35 - 7.45      pCO2, Arterial 38 35 - 45 mmHg    pO2, Arterial 92 80 - 100 mmHg    O2 Sat, Arterial 97 95 - 99 %    HCO3,

## 2024-11-02 NOTE — PROGRESS NOTES
During shift report pt's O2 saturation was low 80's on @ 2L oxygen via nasal cannula. Increased to 3L. Dayshift nurse reported that she started de sating after given 1mg dilaudid. MD Yen notified and ordered hospitalist consult, ABG, CBC, BMP, CXR, prn nebulization, cardiac monitoring and to inform MD Villegas who is covering. Orders carried out and pt assessed by MD Gates -hospitalist. Dilaudid discontinued and Morphine and Toradol ordered.

## 2024-11-02 NOTE — CONSULTS
(175 lb)       Review of Systems   Constitutional:  Negative for chills and fever.   Respiratory:  Negative for shortness of breath.    Cardiovascular:  Negative for chest pain and leg swelling.   Gastrointestinal:  Positive for abdominal pain. Negative for abdominal distention, nausea and vomiting.        No post-op BM yet.   Genitourinary:  Negative for difficulty urinating.   Skin:         Surgical dressing on abdomen   Neurological: Negative.    Psychiatric/Behavioral: Negative.          PHYSICAL EXAM:  Physical Exam  Vitals reviewed.   Constitutional:       Appearance: Normal appearance.   Cardiovascular:      Rate and Rhythm: Normal rate and regular rhythm.      Pulses: Normal pulses.      Heart sounds: Normal heart sounds.   Pulmonary:      Effort: Pulmonary effort is normal.      Breath sounds: Normal breath sounds.   Abdominal:      General: There is no distension.      Palpations: Abdomen is soft.      Tenderness: There is abdominal tenderness.      Comments: Abdominal incision is clean, dry and intact with mild tenderness as expected post-op but without distension or palpable masses. NGT in place.   Musculoskeletal:         General: No swelling.      Right lower leg: No edema.      Left lower leg: No edema.   Skin:     General: Skin is warm and dry.   Neurological:      Mental Status: She is alert and oriented to person, place, and time.   Psychiatric:         Mood and Affect: Mood normal.         Behavior: Behavior normal.               LAB DATA REVIEWED:    Recent Results (from the past 12 hour(s))   POCT Glucose    Collection Time: 11/01/24 11:52 AM   Result Value Ref Range    POC Glucose 146 (H) 65 - 100 mg/dL    Performed by: BISHOP DUQUE    POCT Glucose    Collection Time: 11/01/24  6:02 PM   Result Value Ref Range    POC Glucose 106 (H) 65 - 100 mg/dL    Performed by: Merry Santos    CBC with Auto Differential    Collection Time: 11/01/24  9:30 PM   Result Value Ref Range    WBC 9.9 3.6 - 11.0  recent):  XR CHEST PORTABLE    Result Date: 11/1/2024  EXAM: XR CHEST PORTABLE INDICATION: hypoxia COMPARISON: 11/1/2024 FINDINGS: A portable AP radiograph of the chest was obtained at 2104 hours. PICC line and nasogastric tube are stable.. The lungs are clear. The cardiac and mediastinal contours and pulmonary vascularity are normal.  The bones and soft tissues are grossly within normal limits.     No acute cardiopulmonary process. Electronically signed by Imer Crawley    XR CHEST PORTABLE    Result Date: 11/1/2024  EXAM:  XR CHEST PORTABLE INDICATION: Atelectasis COMPARISON: 10/31/2024 TECHNIQUE: Portable AP upright chest view at 0826 hours FINDINGS: A right arm PICC terminates in profile with the SVC. The enteric tube terminates in the distal stomach or proximal duodenum. The cardiomediastinal contours are stable. There are stable low lung volumes with mild left basilar opacity. There is no pleural effusion or pneumothorax. The bones and upper abdomen are stable with stable right hemidiaphragm elevation.     Stable low lung volumes with mild left basilar atelectasis. Electronically signed by Bolivar Garcia    XR CHEST PORTABLE    Result Date: 10/31/2024  INDICATION: SOB COMPARISON: 10/30/2024 FINDINGS: Single AP portable view of the chest demonstrates endotracheal tube extends to the stomach. The cardiomediastinal silhouette is unchanged. Low lung volumes. No pulmonary edema, pleural effusion, or pneumothorax.     No significant change. Low lung volumes. Electronically signed by Yvan Estrada      _______________________________________________________________________    TOTAL TIME:  70 Minutes    Critical Care Provided     Minutes non procedure based    Signed: Darlene Beebe PA-C    Procedures: see electronic medical records for all procedures/Xrays and details which were not copied into this note but were reviewed prior to creation of Plan.

## 2024-11-02 NOTE — PLAN OF CARE
Problem: Pain  Goal: Verbalizes/displays adequate comfort level or baseline comfort level  11/2/2024 1148 by Vu Cullen RN  Outcome: Progressing  11/2/2024 0015 by Sobia Delgadillo RN  Outcome: Progressing     Problem: Safety - Adult  Goal: Free from fall injury  11/2/2024 1148 by Vu Cullen RN  Outcome: Progressing  11/2/2024 0015 by Sobia Delgadillo RN  Outcome: Progressing     Problem: Skin/Tissue Integrity - Adult  Goal: Skin integrity remains intact  Outcome: Progressing

## 2024-11-02 NOTE — PROGRESS NOTES
Received Order for Telemetry     Britta Quevedo   1950   [id number]   Ventral hernia without obstruction or gangrene [K43.9]  Colostomy status (HCC) [Z93.3]   Berto Yen MD     Tele Box # [id number] placed on patient at  2058 pm  ER Room # n/a  Admitting to Room 504  Verified with Primary Nurse chava at  2058 pm

## 2024-11-02 NOTE — PROGRESS NOTES
SURGERY PROGRESS NOTE      Admit Date: 10/30/2024    POD 3 Days Post-Op    Procedure: Procedure(s):  OPEN COLOSTOMY REVERSAL AND VENTRAL HERNIA REPAIR, appendectomy #1 exploratory laparotomy. 2.  Colostomy reversal with EEA 29 mm device. 3.  Right flank abdominal wall ventral hernia repair with 4 x 6 inch ventral light mesh. 4.  Lysis radiation. #5 interval appendectomy    /65   Pulse 81   Temp 97.9 °F (36.6 °C)   Resp 18   Ht 1.499 m (4' 11.02\")   Wt 73.7 kg (162 lb 7.7 oz)   SpO2 100%   BMI 32.80 kg/m²      Temp (24hrs), Av.9 °F (36.6 °C), Min:97.5 °F (36.4 °C), Max:98.2 °F (36.8 °C)      701 -  1900  In: 1032.7 [I.V.:964.8]  Out: 1000 [Urine:700]  10/31 1901 -  0700  In: 3035.7 [P.O.:100; I.V.:2650.3]  Out: 2125 [Urine:2000; Drains:25]    Assessment and Plan     Principal Problem:    Colostomy status (HCC)  Active Problems:    Ventral hernia without obstruction or gangrene  Resolved Problems:    * No resolved hospital problems. *    ASSESSMENT:   Patient is 74 y.o. with diagnosis of : Principal Problem:    Colostomy status (HCC)  Active Problems:    Ventral hernia without obstruction or gangrene  Resolved Problems:    * No resolved hospital problems. *        PLAN:                 Will change iodoform packing changes colostomy closure site starting tomorrow.  Continue aggressive pulmonary toiletry incentive spirometry hourly.  Continue n.p.o. NG tube until GI function normalizes.  Patient can be out of bed today.  Chest x-ray pending this morning.  Labs are pending this morning.  Continue postop course.  Clears are ok  No evidence of flatus or bowel movements  Appreciate medical team's co management   Events last night noted  Much improved today  Covering for Dr Yen

## 2024-11-03 LAB
ALBUMIN SERPL-MCNC: 2.6 G/DL (ref 3.5–5)
ALBUMIN/GLOB SERPL: 0.8 (ref 1.1–2.2)
ALP SERPL-CCNC: 63 U/L (ref 45–117)
ALT SERPL-CCNC: 32 U/L (ref 12–78)
ANION GAP SERPL CALC-SCNC: 3 MMOL/L (ref 2–12)
AST SERPL W P-5'-P-CCNC: 27 U/L (ref 15–37)
BACTERIA SPEC CULT: NORMAL
BASOPHILS # BLD: 0 K/UL (ref 0–0.1)
BASOPHILS NFR BLD: 1 % (ref 0–1)
BILIRUB SERPL-MCNC: 0.6 MG/DL (ref 0.2–1)
BUN SERPL-MCNC: 16 MG/DL (ref 6–20)
BUN/CREAT SERPL: 19 (ref 12–20)
CA-I BLD-MCNC: 8.8 MG/DL (ref 8.5–10.1)
CHLORIDE SERPL-SCNC: 117 MMOL/L (ref 97–108)
CO2 SERPL-SCNC: 27 MMOL/L (ref 21–32)
CREAT SERPL-MCNC: 0.84 MG/DL (ref 0.55–1.02)
DIFFERENTIAL METHOD BLD: ABNORMAL
EOSINOPHIL # BLD: 0.1 K/UL (ref 0–0.4)
EOSINOPHIL NFR BLD: 3 % (ref 0–7)
ERYTHROCYTE [DISTWIDTH] IN BLOOD BY AUTOMATED COUNT: 14.9 % (ref 11.5–14.5)
GLOBULIN SER CALC-MCNC: 3.2 G/DL (ref 2–4)
GLUCOSE BLD STRIP.AUTO-MCNC: 112 MG/DL (ref 65–100)
GLUCOSE BLD STRIP.AUTO-MCNC: 125 MG/DL (ref 65–100)
GLUCOSE BLD STRIP.AUTO-MCNC: 125 MG/DL (ref 65–100)
GLUCOSE BLD STRIP.AUTO-MCNC: 147 MG/DL (ref 65–100)
GLUCOSE SERPL-MCNC: 132 MG/DL (ref 65–100)
HCT VFR BLD AUTO: 27.2 % (ref 35–47)
HGB BLD-MCNC: 8.7 G/DL (ref 11.5–16)
IMM GRANULOCYTES # BLD AUTO: 0.1 K/UL (ref 0–0.04)
IMM GRANULOCYTES NFR BLD AUTO: 2 % (ref 0–0.5)
LYMPHOCYTES # BLD: 0.4 K/UL (ref 0.8–3.5)
LYMPHOCYTES NFR BLD: 11 % (ref 12–49)
Lab: NORMAL
MCH RBC QN AUTO: 29.2 PG (ref 26–34)
MCHC RBC AUTO-ENTMCNC: 32 G/DL (ref 30–36.5)
MCV RBC AUTO: 91.3 FL (ref 80–99)
MONOCYTES # BLD: 0.4 K/UL (ref 0–1)
MONOCYTES NFR BLD: 10 % (ref 5–13)
NEUTS SEG # BLD: 3 K/UL (ref 1.8–8)
NEUTS SEG NFR BLD: 73 % (ref 32–75)
NRBC # BLD: 0 K/UL (ref 0–0.01)
NRBC BLD-RTO: 0 PER 100 WBC
PERFORMED BY:: ABNORMAL
PLATELET # BLD AUTO: 190 K/UL (ref 150–400)
PMV BLD AUTO: 10.1 FL (ref 8.9–12.9)
POTASSIUM SERPL-SCNC: 3.2 MMOL/L (ref 3.5–5.1)
PROT SERPL-MCNC: 5.8 G/DL (ref 6.4–8.2)
RBC # BLD AUTO: 2.98 M/UL (ref 3.8–5.2)
SODIUM SERPL-SCNC: 147 MMOL/L (ref 136–145)
WBC # BLD AUTO: 4.1 K/UL (ref 3.6–11)

## 2024-11-03 PROCEDURE — 80053 COMPREHEN METABOLIC PANEL: CPT

## 2024-11-03 PROCEDURE — 94640 AIRWAY INHALATION TREATMENT: CPT

## 2024-11-03 PROCEDURE — 2500000003 HC RX 250 WO HCPCS: Performed by: HOSPITALIST

## 2024-11-03 PROCEDURE — 1100000000 HC RM PRIVATE

## 2024-11-03 PROCEDURE — 6360000002 HC RX W HCPCS: Performed by: SURGERY

## 2024-11-03 PROCEDURE — 2700000000 HC OXYGEN THERAPY PER DAY

## 2024-11-03 PROCEDURE — 94761 N-INVAS EAR/PLS OXIMETRY MLT: CPT

## 2024-11-03 PROCEDURE — 82962 GLUCOSE BLOOD TEST: CPT

## 2024-11-03 PROCEDURE — 85025 COMPLETE CBC W/AUTO DIFF WBC: CPT

## 2024-11-03 PROCEDURE — 2500000003 HC RX 250 WO HCPCS: Performed by: NURSE PRACTITIONER

## 2024-11-03 PROCEDURE — 6370000000 HC RX 637 (ALT 250 FOR IP): Performed by: INTERNAL MEDICINE

## 2024-11-03 PROCEDURE — 94669 MECHANICAL CHEST WALL OSCILL: CPT

## 2024-11-03 PROCEDURE — 2580000003 HC RX 258: Performed by: SURGERY

## 2024-11-03 PROCEDURE — 97116 GAIT TRAINING THERAPY: CPT

## 2024-11-03 PROCEDURE — 6360000002 HC RX W HCPCS: Performed by: PHYSICIAN ASSISTANT

## 2024-11-03 PROCEDURE — 2580000003 HC RX 258: Performed by: NURSE PRACTITIONER

## 2024-11-03 PROCEDURE — 36415 COLL VENOUS BLD VENIPUNCTURE: CPT

## 2024-11-03 RX ORDER — IPRATROPIUM BROMIDE AND ALBUTEROL SULFATE 2.5; .5 MG/3ML; MG/3ML
1 SOLUTION RESPIRATORY (INHALATION) EVERY 12 HOURS PRN
Status: DISCONTINUED | OUTPATIENT
Start: 2024-11-03 | End: 2024-11-06 | Stop reason: HOSPADM

## 2024-11-03 RX ADMIN — MORPHINE SULFATE 1 MG: 2 INJECTION, SOLUTION INTRAMUSCULAR; INTRAVENOUS at 11:45

## 2024-11-03 RX ADMIN — ENOXAPARIN SODIUM 40 MG: 100 INJECTION SUBCUTANEOUS at 09:36

## 2024-11-03 RX ADMIN — KETOROLAC TROMETHAMINE 15 MG: 15 INJECTION, SOLUTION INTRAMUSCULAR; INTRAVENOUS at 20:52

## 2024-11-03 RX ADMIN — PIPERACILLIN AND TAZOBACTAM 3375 MG: 3; .375 INJECTION, POWDER, LYOPHILIZED, FOR SOLUTION INTRAVENOUS at 02:13

## 2024-11-03 RX ADMIN — POTASSIUM CHLORIDE, DEXTROSE MONOHYDRATE AND SODIUM CHLORIDE: 75; 5; 450 INJECTION, SOLUTION INTRAVENOUS at 02:13

## 2024-11-03 RX ADMIN — PIPERACILLIN AND TAZOBACTAM 3375 MG: 3; .375 INJECTION, POWDER, LYOPHILIZED, FOR SOLUTION INTRAVENOUS at 09:38

## 2024-11-03 RX ADMIN — POTASSIUM CHLORIDE, DEXTROSE MONOHYDRATE AND SODIUM CHLORIDE: 75; 5; 450 INJECTION, SOLUTION INTRAVENOUS at 15:53

## 2024-11-03 RX ADMIN — IPRATROPIUM BROMIDE AND ALBUTEROL SULFATE 1 DOSE: .5; 2.5 SOLUTION RESPIRATORY (INHALATION) at 09:41

## 2024-11-03 RX ADMIN — SODIUM CHLORIDE, PRESERVATIVE FREE 10 ML: 5 INJECTION INTRAVENOUS at 20:52

## 2024-11-03 RX ADMIN — IPRATROPIUM BROMIDE AND ALBUTEROL SULFATE 1 DOSE: .5; 2.5 SOLUTION RESPIRATORY (INHALATION) at 14:03

## 2024-11-03 RX ADMIN — SODIUM CHLORIDE, PRESERVATIVE FREE 20 MG: 5 INJECTION INTRAVENOUS at 09:36

## 2024-11-03 RX ADMIN — SODIUM CHLORIDE, PRESERVATIVE FREE 10 ML: 5 INJECTION INTRAVENOUS at 09:37

## 2024-11-03 RX ADMIN — PIPERACILLIN AND TAZOBACTAM 3375 MG: 3; .375 INJECTION, POWDER, LYOPHILIZED, FOR SOLUTION INTRAVENOUS at 17:53

## 2024-11-03 ASSESSMENT — PAIN SCALES - GENERAL
PAINLEVEL_OUTOF10: 7
PAINLEVEL_OUTOF10: 4
PAINLEVEL_OUTOF10: 7

## 2024-11-03 ASSESSMENT — PAIN DESCRIPTION - LOCATION
LOCATION: ABDOMEN
LOCATION: ABDOMEN

## 2024-11-03 ASSESSMENT — PAIN DESCRIPTION - DESCRIPTORS
DESCRIPTORS: ACHING
DESCRIPTORS: ACHING

## 2024-11-03 ASSESSMENT — PAIN - FUNCTIONAL ASSESSMENT: PAIN_FUNCTIONAL_ASSESSMENT: ACTIVITIES ARE NOT PREVENTED

## 2024-11-03 ASSESSMENT — PAIN SCALES - WONG BAKER: WONGBAKER_NUMERICALRESPONSE: HURTS A LITTLE BIT

## 2024-11-03 ASSESSMENT — PAIN DESCRIPTION - ORIENTATION
ORIENTATION: ANTERIOR
ORIENTATION: MID;LEFT

## 2024-11-03 NOTE — PLAN OF CARE
Problem: Pain  Goal: Verbalizes/displays adequate comfort level or baseline comfort level  11/3/2024 1059 by Vu Cullen RN  Outcome: Progressing  11/2/2024 2211 by Sobia Delgadillo RN  Outcome: Progressing     Problem: Safety - Adult  Goal: Free from fall injury  11/3/2024 1059 by Vu Cullen RN  Outcome: Progressing  11/2/2024 2211 by Sobia Delgadillo, RN  Outcome: Progressing     Problem: Skin/Tissue Integrity - Adult  Goal: Skin integrity remains intact  Outcome: Progressing     Problem: Skin/Tissue Integrity  Goal: Absence of new skin breakdown  Description: 1.  Monitor for areas of redness and/or skin breakdown  2.  Assess vascular access sites hourly  3.  Every 4-6 hours minimum:  Change oxygen saturation probe site  4.  Every 4-6 hours:  If on nasal continuous positive airway pressure, respiratory therapy assess nares and determine need for appliance change or resting period.  Outcome: Progressing

## 2024-11-03 NOTE — PROGRESS NOTES
SURGERY PROGRESS NOTE      Admit Date: 10/30/2024    POD 4 Days Post-Op    Procedure: Procedure(s):  OPEN COLOSTOMY REVERSAL AND VENTRAL HERNIA REPAIR, appendectomy #1 exploratory laparotomy. 2.  Colostomy reversal with EEA 29 mm device. 3.  Right flank abdominal wall ventral hernia repair with 4 x 6 inch ventral light mesh. 4.  Lysis radiation. #5 interval appendectomy    Objective:     BP (!) 141/63   Pulse 75   Temp 98.4 °F (36.9 °C) (Oral)   Resp 18   Ht 1.499 m (4' 11.02\")   Wt 73.7 kg (162 lb 7.7 oz)   SpO2 96%   BMI 32.80 kg/m²      Temp (24hrs), Av.2 °F (36.8 °C), Min:97.7 °F (36.5 °C), Max:98.6 °F (37 °C)      701 -  1900  In: -   Out: 20 [Drains:20]   190 -  0700  In: 5393.2 [P.O.:100; I.V.:4846.5]  Out: 2800 [Urine:2250]    ASSESSMENT:   Patient is 74 y.o. with diagnosis of : Principal Problem:    Colostomy status (formerly [facility])  Active Problems:    Ventral hernia without obstruction or gangrene  Resolved Problems:    * No resolved hospital problems. *     PLAN:                 Will change iodoform packing changes colostomy closure site starting tomorrow.  Continue aggressive pulmonary toiletry incentive spirometry hourly.  NG tube remove, clears started  Patient can be out of bed today.  Continue postop course.  Clears are ok  evidence of flatus today but no bowel movements  Ok to start clear liquids  Minimal bleeding from the midline incision stopped with reinforcement  Much improved today  Covering for Dr Yen who will assume surgical care in am

## 2024-11-03 NOTE — PROGRESS NOTES
Hospitalist Progress Note    NAME:   Britta Quevedo   : 1950   MRN: [id number]     Subjective:   Daily Progress Note:  11/3/2024    Chief complaint:No chief complaint on file.  Medical consult follow-up for hypoxia      Hospital course to date/HPI from H&P:  Britta Quevedo is a 74 y.o.  female with PMHx significant for bowel perf during colonoscopy requiring sigmoid colon resection and colostomy placement on 2024, CKD stage 3A, depression, HLD, HTN, rheumatoid arthritis, and DM controlled with diet admitted on 10/30 post-op elective colostomy reversal and ventral hernia repair.  Patient was admitted to ICU following procedure for postop pulmonary respiratory distress and hypoxia. She was transferred out of ICU on 10/31. She is n.p.o. with NG tube in place until GI function normalizes.  Patient states that she is urinating but has not yet had a bowel movement.  Surgical service has requested hospitalist evaluation for medical management.  Patient was found to have hyperkalemia although repeat lab was normal.       24-patient transferred out of ICU yesterday evening, currently comfortable in bed.  Has NG tube.  No acute issues reported to me by patient or staff.    11/3/24-patient seen and examined, comfortable in bed, still has NG tube with suctioning.  Wants to try some food.  No other acute issues reported to me by staff.  Labs pending from today.      Objective:     BP (!) 141/63   Pulse 75   Temp 98.4 °F (36.9 °C) (Oral)   Resp 18   Ht 1.499 m (4' 11.02\")   Wt 73.7 kg (162 lb 7.7 oz)   SpO2 96%   BMI 32.80 kg/m²  O2 Flow Rate (L/min): 1 L/min      Temp (24hrs), Av.2 °F (36.8 °C), Min:97.7 °F (36.5 °C), Max:98.6 °F (37 °C)        PHYSICAL EXAM:  Gen WDWN  Neck Supple  CVS RRR  Resp Symmetric expansion  Abdomen soft,  surgical dressings +  Ext no edema  Neuro Alert    Psych Normal Affect  Skin no visible Rash        Data Review:      Recent Labs     24  1011 24  2130  requiring intensive monitoring for toxicity with labs and levels including but not limited to insulin with hypoglycemia glucose monitoring on sliding scale, heparin/Lovenox for DVT prophylaxis with periodic hemoglobin monitoring for bleeding issues. Personally reviewed in detail in conjunction w/ labs as documented for evidence of drug toxicity. In addition, prior medical, surgical and relevant social and family histories were reviewed. I have discussed management plan with patient/family/consultant and with nursing staff as much as feasible.          AD FC  DVT Prx - as per surgery team    Disposition-As per surgery team        This is dictation was done by dragon, computer voice recognition software.  Quite often unanticipated grammatical, syntax, homophones and other interpretive errors or inadvertently transcribed by the computer software.  Please excuse errors that have escaped final proofreading. Please contact me for any questions or details.  Thank you.     Signed By: Daniel Trejo MD     November 3, 2024

## 2024-11-03 NOTE — PLAN OF CARE
PT attempted to see patient for therapy session but nurse currently present in room to remove NG tube and clean patient after having a BM. Patient agreeable to walk with PT today so will return at a later time once patient is cleaned up.

## 2024-11-03 NOTE — PLAN OF CARE
Problem: Physical Therapy - Adult  Goal: By Discharge: Performs mobility at highest level of function for planned discharge setting.  See evaluation for individualized goals.  Description: FUNCTIONAL STATUS PRIOR TO ADMISSION: Patient was modified independent using a rolling walker/NO AD for functional mobility.    HOME SUPPORT PRIOR TO ADMISSION: Patient lives with daughter and was indep at home    Physical Therapy Goals  Initiated 11/1/2024  Pt stated goal: Get better  Pt will be I with LE HEP in 7 days.  Pt will perform bed mobility with Modified [place] in 7 days.  Pt will perform transfers with Modified [place] in 7 days.   Pt will amb 150 feet with LRAD safely with Modified [place] in 7 days.    Pt will verbalize and demonstrate compliance with fall/surgical precautions in 7 days.   Pt will demonstrate improvement in standing/gait balance from fair to good in 7 days.    Outcome: Progressing            PHYSICAL THERAPY TREATMENT     Patient: Britta Quevedo (74 y.o. female)  Date: 11/3/2024  Diagnosis: Ventral hernia without obstruction or gangrene [K43.9]  Colostomy status (HCC) [Z93.3] Colostomy status (HCC)  Procedure(s) (LRB):  OPEN COLOSTOMY REVERSAL AND VENTRAL HERNIA REPAIR, appendectomy #1 exploratory laparotomy. 2.  Colostomy reversal with EEA 29 mm device. 3.  Right flank abdominal wall ventral hernia repair with 4 x 6 inch ventral light mesh. 4.  Lysis radiation. #5 interval appendectomy (N/A) 4 Days Post-Op  Precautions:                        Recommendations for nursing mobility: Out of bed to chair for meals, Encourage HEP in prep for ADLs/mobility; see handout for details, AD and gt belt for bed to chair , Amb to bathroom with AD and gait belt, Amb in hallway, and Assist x1    In place during session: Peripheral IV, External Catheter, and EKG/telemetry   Chart, physical therapy assessment, plan of care and goals were reviewed.  ASSESSMENT  Patient continues with skilled PT  services and is progressing towards goals. Pt semi-supine upon PT arrival, agreeable to session. Pt A&O x 4. (See below for objective details and assist levels).     Overall pt tolerated session fair today with bed mobility performed with SBA and incr time to come to sitting EOB. Good sitting balance noted. Sit<>stand from bed with CGA and RW for support. Patient ambulated in hallways for approx 250 feet x 1 with CGA for safety. No LOB or buckling noted and good navigation of obstacles. Returned patient to room to sit up in recliner at end of session. Positioned with all needs in reach and PCT present in room. Will continue to benefit from skilled PT services, and will continue to progress as tolerated. Current PT DC recommendation Intermittent physical therapy up to 2-3x/week in previous living setting with additional support needed for safety and supervision  once medically appropriate.    GOALS:  Initiated 11/1/2024  Pt stated goal: Get better  Pt will be I with LE HEP in 7 days.  Pt will perform bed mobility with Modified [place] in 7 days.  Pt will perform transfers with Modified [place] in 7 days.   Pt will amb 150 feet with LRAD safely with Modified [place] in 7 days.  Pt will verbalize and demonstrate compliance with fall/surgical precautions in 7 days.   Pt will demonstrate improvement in standing/gait balance from fair to good in 7 days.         PLAN :  Patient continues to benefit from skilled intervention to address functional impairments. Continue treatment per established plan of care to address goals.    Recommendation for discharge: (in order for the patient to meet his/her long term goals)  Intermittent physical therapy up to 2-3x/week in previous living setting with additional support needed for safety and supervision    Potential barriers for safe discharge: pt is a high fall risk, pt is not safe to be alone, and concern for pt safely navigating or managing the home

## 2024-11-03 NOTE — PLAN OF CARE
Problem: Pain  Goal: Verbalizes/displays adequate comfort level or baseline comfort level  11/2/2024 2211 by Sobia Delgadillo, RN  Outcome: Progressing  11/2/2024 1148 by Vu Cullen, RN  Outcome: Progressing     Problem: Safety - Adult  Goal: Free from fall injury  11/2/2024 2211 by Sobia Delgadillo, RN  Outcome: Progressing  11/2/2024 1148 by Vu Cullen, RN  Outcome: Progressing     Problem: Discharge Planning  Goal: Discharge to home or other facility with appropriate resources  Recent Flowsheet Documentation  Taken 11/2/2024 1930 by Sobia Delgadillo, RN  Discharge to home or other facility with appropriate resources: Identify barriers to discharge with patient and caregiver     Problem: Skin/Tissue Integrity - Adult  Goal: Skin integrity remains intact  11/2/2024 1148 by Vu Cullen, RN  Outcome: Progressing

## 2024-11-04 LAB
ALBUMIN SERPL-MCNC: 2.5 G/DL (ref 3.5–5)
ALBUMIN/GLOB SERPL: 0.8 (ref 1.1–2.2)
ALP SERPL-CCNC: 58 U/L (ref 45–117)
ALT SERPL-CCNC: 28 U/L (ref 12–78)
ANION GAP SERPL CALC-SCNC: 7 MMOL/L (ref 2–12)
AST SERPL W P-5'-P-CCNC: 23 U/L (ref 15–37)
BASOPHILS # BLD: 0 K/UL (ref 0–0.1)
BASOPHILS NFR BLD: 0 % (ref 0–1)
BILIRUB SERPL-MCNC: 0.6 MG/DL (ref 0.2–1)
BUN SERPL-MCNC: 12 MG/DL (ref 6–20)
BUN/CREAT SERPL: 15 (ref 12–20)
CA-I BLD-MCNC: 8.6 MG/DL (ref 8.5–10.1)
CHLORIDE SERPL-SCNC: 117 MMOL/L (ref 97–108)
CO2 SERPL-SCNC: 23 MMOL/L (ref 21–32)
CREAT SERPL-MCNC: 0.82 MG/DL (ref 0.55–1.02)
DIFFERENTIAL METHOD BLD: ABNORMAL
EOSINOPHIL # BLD: 0.1 K/UL (ref 0–0.4)
EOSINOPHIL NFR BLD: 4 % (ref 0–7)
ERYTHROCYTE [DISTWIDTH] IN BLOOD BY AUTOMATED COUNT: 14.7 % (ref 11.5–14.5)
GLOBULIN SER CALC-MCNC: 3 G/DL (ref 2–4)
GLUCOSE BLD STRIP.AUTO-MCNC: 110 MG/DL (ref 65–100)
GLUCOSE BLD STRIP.AUTO-MCNC: 126 MG/DL (ref 65–100)
GLUCOSE BLD STRIP.AUTO-MCNC: 128 MG/DL (ref 65–100)
GLUCOSE BLD STRIP.AUTO-MCNC: 138 MG/DL (ref 65–100)
GLUCOSE SERPL-MCNC: 131 MG/DL (ref 65–100)
HCT VFR BLD AUTO: 24.9 % (ref 35–47)
HGB BLD-MCNC: 8.1 G/DL (ref 11.5–16)
IMM GRANULOCYTES # BLD AUTO: 0 K/UL
IMM GRANULOCYTES NFR BLD AUTO: 0 %
LYMPHOCYTES # BLD: 0.4 K/UL (ref 0.8–3.5)
LYMPHOCYTES NFR BLD: 12 % (ref 12–49)
MCH RBC QN AUTO: 28.9 PG (ref 26–34)
MCHC RBC AUTO-ENTMCNC: 32.5 G/DL (ref 30–36.5)
MCV RBC AUTO: 88.9 FL (ref 80–99)
METAMYELOCYTES NFR BLD MANUAL: 2 %
MONOCYTES # BLD: 0.4 K/UL (ref 0–1)
MONOCYTES NFR BLD: 10 % (ref 5–13)
NEUTS SEG # BLD: 2.6 K/UL (ref 1.8–8)
NEUTS SEG NFR BLD: 72 % (ref 32–75)
NRBC # BLD: 0.02 K/UL (ref 0–0.01)
NRBC BLD-RTO: 0.6 PER 100 WBC
PERFORMED BY:: ABNORMAL
PLATELET # BLD AUTO: 172 K/UL (ref 150–400)
PMV BLD AUTO: 9.9 FL (ref 8.9–12.9)
POTASSIUM SERPL-SCNC: 2.8 MMOL/L (ref 3.5–5.1)
PROT SERPL-MCNC: 5.5 G/DL (ref 6.4–8.2)
RBC # BLD AUTO: 2.8 M/UL (ref 3.8–5.2)
RBC MORPH BLD: ABNORMAL
SODIUM SERPL-SCNC: 147 MMOL/L (ref 136–145)
WBC # BLD AUTO: 3.6 K/UL (ref 3.6–11)

## 2024-11-04 PROCEDURE — 85025 COMPLETE CBC W/AUTO DIFF WBC: CPT

## 2024-11-04 PROCEDURE — 82962 GLUCOSE BLOOD TEST: CPT

## 2024-11-04 PROCEDURE — 6360000002 HC RX W HCPCS: Performed by: SURGERY

## 2024-11-04 PROCEDURE — 36415 COLL VENOUS BLD VENIPUNCTURE: CPT

## 2024-11-04 PROCEDURE — 2580000003 HC RX 258: Performed by: NURSE PRACTITIONER

## 2024-11-04 PROCEDURE — 1100000000 HC RM PRIVATE

## 2024-11-04 PROCEDURE — 2580000003 HC RX 258: Performed by: SURGERY

## 2024-11-04 PROCEDURE — 6370000000 HC RX 637 (ALT 250 FOR IP): Performed by: SURGERY

## 2024-11-04 PROCEDURE — 6370000000 HC RX 637 (ALT 250 FOR IP): Performed by: HOSPITALIST

## 2024-11-04 PROCEDURE — 99024 POSTOP FOLLOW-UP VISIT: CPT | Performed by: SURGERY

## 2024-11-04 PROCEDURE — 2500000003 HC RX 250 WO HCPCS: Performed by: NURSE PRACTITIONER

## 2024-11-04 PROCEDURE — 94761 N-INVAS EAR/PLS OXIMETRY MLT: CPT

## 2024-11-04 PROCEDURE — 80053 COMPREHEN METABOLIC PANEL: CPT

## 2024-11-04 PROCEDURE — 94669 MECHANICAL CHEST WALL OSCILL: CPT

## 2024-11-04 PROCEDURE — 97530 THERAPEUTIC ACTIVITIES: CPT

## 2024-11-04 PROCEDURE — 2500000003 HC RX 250 WO HCPCS: Performed by: HOSPITALIST

## 2024-11-04 PROCEDURE — 97116 GAIT TRAINING THERAPY: CPT

## 2024-11-04 RX ORDER — POTASSIUM CHLORIDE 7.45 MG/ML
10 INJECTION INTRAVENOUS
Status: DISCONTINUED | OUTPATIENT
Start: 2024-11-04 | End: 2024-11-04

## 2024-11-04 RX ORDER — POTASSIUM CHLORIDE 7.45 MG/ML
10 INJECTION INTRAVENOUS
Status: DISPENSED | OUTPATIENT
Start: 2024-11-04 | End: 2024-11-04

## 2024-11-04 RX ORDER — MAGNESIUM SULFATE 1 G/100ML
1000 INJECTION INTRAVENOUS ONCE
Status: COMPLETED | OUTPATIENT
Start: 2024-11-04 | End: 2024-11-04

## 2024-11-04 RX ORDER — TRAMADOL HYDROCHLORIDE 50 MG/1
50 TABLET ORAL EVERY 8 HOURS PRN
Qty: 21 TABLET | Refills: 0 | Status: SHIPPED | OUTPATIENT
Start: 2024-11-04 | End: 2024-11-11

## 2024-11-04 RX ORDER — LISINOPRIL 20 MG/1
20 TABLET ORAL DAILY
Status: DISCONTINUED | OUTPATIENT
Start: 2024-11-05 | End: 2024-11-06 | Stop reason: HOSPADM

## 2024-11-04 RX ORDER — ROSUVASTATIN CALCIUM 20 MG/1
20 TABLET, COATED ORAL DAILY
Status: DISCONTINUED | OUTPATIENT
Start: 2024-11-04 | End: 2024-11-06 | Stop reason: HOSPADM

## 2024-11-04 RX ORDER — DEXTROSE MONOHYDRATE, SODIUM CHLORIDE, SODIUM LACTATE, POTASSIUM CHLORIDE, CALCIUM CHLORIDE 5; 600; 310; 179; 20 G/100ML; MG/100ML; MG/100ML; MG/100ML; MG/100ML
INJECTION, SOLUTION INTRAVENOUS CONTINUOUS
Status: DISCONTINUED | OUTPATIENT
Start: 2024-11-04 | End: 2024-11-06 | Stop reason: HOSPADM

## 2024-11-04 RX ORDER — ESCITALOPRAM OXALATE 10 MG/1
20 TABLET ORAL DAILY
Status: DISCONTINUED | OUTPATIENT
Start: 2024-11-04 | End: 2024-11-06 | Stop reason: HOSPADM

## 2024-11-04 RX ORDER — OXYCODONE AND ACETAMINOPHEN 5; 325 MG/1; MG/1
1 TABLET ORAL EVERY 4 HOURS PRN
Status: DISCONTINUED | OUTPATIENT
Start: 2024-11-04 | End: 2024-11-06 | Stop reason: HOSPADM

## 2024-11-04 RX ORDER — POTASSIUM CHLORIDE 1500 MG/1
40 TABLET, EXTENDED RELEASE ORAL ONCE
Status: COMPLETED | OUTPATIENT
Start: 2024-11-04 | End: 2024-11-04

## 2024-11-04 RX ORDER — POTASSIUM CHLORIDE 7.45 MG/ML
10 INJECTION INTRAVENOUS
Status: COMPLETED | OUTPATIENT
Start: 2024-11-04 | End: 2024-11-04

## 2024-11-04 RX ADMIN — PIPERACILLIN AND TAZOBACTAM 3375 MG: 3; .375 INJECTION, POWDER, LYOPHILIZED, FOR SOLUTION INTRAVENOUS at 02:30

## 2024-11-04 RX ADMIN — PIPERACILLIN AND TAZOBACTAM 3375 MG: 3; .375 INJECTION, POWDER, LYOPHILIZED, FOR SOLUTION INTRAVENOUS at 18:27

## 2024-11-04 RX ADMIN — DEXTROSE MONOHYDRATE, SODIUM CHLORIDE, SODIUM LACTATE, POTASSIUM CHLORIDE, CALCIUM CHLORIDE: 5; 600; 310; 179; 20 INJECTION, SOLUTION INTRAVENOUS at 16:20

## 2024-11-04 RX ADMIN — POTASSIUM CHLORIDE 10 MEQ: 7.46 INJECTION, SOLUTION INTRAVENOUS at 10:35

## 2024-11-04 RX ADMIN — MAGNESIUM SULFATE HEPTAHYDRATE 1000 MG: 1 INJECTION, SOLUTION INTRAVENOUS at 14:47

## 2024-11-04 RX ADMIN — POTASSIUM CHLORIDE 10 MEQ: 7.46 INJECTION, SOLUTION INTRAVENOUS at 12:10

## 2024-11-04 RX ADMIN — SODIUM CHLORIDE, PRESERVATIVE FREE 10 ML: 5 INJECTION INTRAVENOUS at 20:35

## 2024-11-04 RX ADMIN — ESCITALOPRAM OXALATE 20 MG: 10 TABLET ORAL at 13:06

## 2024-11-04 RX ADMIN — SODIUM CHLORIDE, PRESERVATIVE FREE 20 MG: 5 INJECTION INTRAVENOUS at 10:21

## 2024-11-04 RX ADMIN — OXYCODONE AND ACETAMINOPHEN 1 TABLET: 5; 325 TABLET ORAL at 19:04

## 2024-11-04 RX ADMIN — SODIUM CHLORIDE, PRESERVATIVE FREE 10 ML: 5 INJECTION INTRAVENOUS at 10:20

## 2024-11-04 RX ADMIN — POTASSIUM CHLORIDE, DEXTROSE MONOHYDRATE AND SODIUM CHLORIDE: 75; 5; 450 INJECTION, SOLUTION INTRAVENOUS at 05:38

## 2024-11-04 RX ADMIN — OXYCODONE AND ACETAMINOPHEN 1 TABLET: 5; 325 TABLET ORAL at 10:30

## 2024-11-04 RX ADMIN — PIPERACILLIN AND TAZOBACTAM 3375 MG: 3; .375 INJECTION, POWDER, LYOPHILIZED, FOR SOLUTION INTRAVENOUS at 10:19

## 2024-11-04 RX ADMIN — POTASSIUM CHLORIDE 10 MEQ: 7.46 INJECTION, SOLUTION INTRAVENOUS at 13:03

## 2024-11-04 RX ADMIN — POTASSIUM CHLORIDE 40 MEQ: 1500 TABLET, EXTENDED RELEASE ORAL at 12:03

## 2024-11-04 RX ADMIN — ROSUVASTATIN CALCIUM 20 MG: 20 TABLET, FILM COATED ORAL at 13:06

## 2024-11-04 ASSESSMENT — PAIN SCALES - GENERAL
PAINLEVEL_OUTOF10: 6
PAINLEVEL_OUTOF10: 9
PAINLEVEL_OUTOF10: 4
PAINLEVEL_OUTOF10: 9
PAINLEVEL_OUTOF10: 0

## 2024-11-04 ASSESSMENT — PAIN DESCRIPTION - LOCATION
LOCATION: ABDOMEN
LOCATION: ABDOMEN

## 2024-11-04 ASSESSMENT — PAIN DESCRIPTION - ORIENTATION
ORIENTATION: ANTERIOR
ORIENTATION: ANTERIOR

## 2024-11-04 ASSESSMENT — PAIN DESCRIPTION - DESCRIPTORS
DESCRIPTORS: ACHING
DESCRIPTORS: ACHING

## 2024-11-04 NOTE — PROGRESS NOTES
Spiritual Health History and Assessment/Progress Note  [facility]    Follow-up, Emotional distress, Life Adjustments, Adjustment to illness,      Name: Britta Quevedo MRN: [id number]    Age: 74 y.o.     Sex: female   Language: English   Shinto: Shinto   Colostomy status ([facility])     Date: 2024            Total Time Calculated: 45 min              Spiritual Assessment began in [facility] SURGICAL        Referral/Consult From: Nurse   Encounter Overview/Reason: Follow-up  Service Provided For: Patient    Sarika, Belief, Meaning:   Patient is connected with a sarika tradition or spiritual practice and has beliefs or practices that help with coping during difficult times  Family/Friends No family/friends present      Importance and Influence:  Patient has spiritual/personal beliefs that influence decisions regarding their health  Family/Friends No family/friends present    Community:  Patient is connected with a spiritual community and feels well-supported. Support system includes: Children, Sarika Community, Friends, and Extended family  Family/Friends No family/friends present    Assessment and Plan of Care:   Patient seen at this time alone in her room resting in bed. Patient shared life/review/legacy including family, work, and being of Shinto sarika. Patient tearful as she shared memories and emotions of her daughter that . Shared current medical reason for being in the hospital and the process it's taken on her. Shared she loves God and her family and takes sherlyn in having her grands. Patient requested prayer. Listened empathically providing time and space for reflection and sharing of life's sacred events. Provided words of comfort and encouragement. Prayed fervent prayer including laying on of hands. Maintained ministry of presence. Patient smiled intermittently as she shared picture of her and thanked  for visiting. Consulted with nurse.  available upon request.   Patient

## 2024-11-04 NOTE — PLAN OF CARE
PHYSICAL THERAPY TREATMENT     Patient: Britta Quevedo (74 y.o. female)  Date: 11/4/2024  Diagnosis: Ventral hernia without obstruction or gangrene [K43.9]  Colostomy status (HCC) [Z93.3] Colostomy status (HCC)  Procedure(s) (LRB):  OPEN COLOSTOMY REVERSAL AND VENTRAL HERNIA REPAIR, appendectomy #1 exploratory laparotomy. 2.  Colostomy reversal with EEA 29 mm device. 3.  Right flank abdominal wall ventral hernia repair with 4 x 6 inch ventral light mesh. 4.  Lysis radiation. #5 interval appendectomy (N/A) 5 Days Post-Op  Precautions:                        Recommendations for nursing mobility: Out of bed to chair for meals, Encourage HEP in prep for ADLs/mobility; see handout for details, AD and gt belt for bed to chair , Amb to bathroom with AD and gait belt, Amb in hallway, and Assist x1    In place during session: Peripheral IV, External Catheter, DANIEL drain 1#, and EKG/telemetry   Chart, physical therapy assessment, plan of care and goals were reviewed.  ASSESSMENT  Patient continues with skilled PT services and is progressing towards goals. Pt semi supine upon PT arrival, agreeable to session. Pt A&O x 4. (See below for objective details and assist levels).     Overall pt tolerated session well today with c/o minor abdominal pain at rest, unchanged with functional mobility. Pt completed bed mobility with additional time and SBA-CGA, requires use of bed rails and VC for log roll technique. Pt's linens and gown soiled with urine, pt completed sit > stand and pericare in standing with occasional UE support on RW. Pt amb 300' with RW, gait belt and CGA; demos good overall gait mechanics, decreased step clearance bilaterally however no LOB noted. Pt's HR increased to 136bpm with amb and pt presents with mild SOB spO2 sat 97% on RA. Pt completed toilet transfer and toilet hygiene with SBA. Pt returned to supine with CGA requires verbal cues for log roll technique however pt unable to demo.  Will continue to  benefit from skilled PT services, and will continue to progress as tolerated. Current PT DC recommendation Intermittent physical therapy up to 2-3x/week in previous living setting once medically appropriate.     Start of Session With mobility End of Session   SPO2 (%) 97  97   Heart Rate (BPM)  136  115     GOALS:    Problem: Physical Therapy - Adult  Goal: By Discharge: Performs mobility at highest level of function for planned discharge setting.  See evaluation for individualized goals.  Description: FUNCTIONAL STATUS PRIOR TO ADMISSION: Patient was modified independent using a rolling walker/NO AD for functional mobility.    HOME SUPPORT PRIOR TO ADMISSION: Patient lives with daughter and was indep at home    Physical Therapy Goals  Initiated 11/1/2024  Pt stated goal: Get better  Pt will be I with LE HEP in 7 days.  Pt will perform bed mobility with Modified [place] in 7 days.  Pt will perform transfers with Modified [place] in 7 days.   Pt will amb 150 feet with LRAD safely with Modified [place] in 7 days.    Pt will verbalize and demonstrate compliance with fall/surgical precautions in 7 days.   Pt will demonstrate improvement in standing/gait balance from fair to good in 7 days.    Outcome: Progressing          PLAN :  Patient continues to benefit from skilled intervention to address functional impairments. Continue treatment per established plan of care to address goals.    Recommendation for discharge: (in order for the patient to meet his/her long term goals)  Intermittent physical therapy up to 2-3x/week in previous living setting    Potential barriers for safe discharge: pts available support system works or is unable to provide adequate supervision leaving the patient alone at times during the day and concern for pt safely navigating or managing the home environment.    IF patient discharges home will need the following DME:patient owns DME required for discharge     SUBJECTIVE:   Patient

## 2024-11-04 NOTE — PROGRESS NOTES
Hospitalist Progress Note    NAME:   Britta Quevedo   : 1950   MRN: [id number]     Subjective:   Daily Progress Note:  2024    Chief complaint:No chief complaint on file.  Medical consult follow-up for hypoxia      Hospital course to date/HPI from H&P:  Britta Quevedo is a 74 y.o.  female with PMHx significant for bowel perf during colonoscopy requiring sigmoid colon resection and colostomy placement on 2024, CKD stage 3A, depression, HLD, HTN, rheumatoid arthritis, and DM controlled with diet admitted on 10/30 post-op elective colostomy reversal and ventral hernia repair.  Patient was admitted to ICU following procedure for postop pulmonary respiratory distress and hypoxia. She was transferred out of ICU on 10/31. She is n.p.o. with NG tube in place until GI function normalizes.  Patient states that she is urinating but has not yet had a bowel movement.  Surgical service has requested hospitalist evaluation for medical management.  Patient was found to have hyperkalemia although repeat lab was normal.       24-patient transferred out of ICU yesterday evening, currently comfortable in bed.  Has NG tube.  No acute issues reported to me by patient or staff.    11/3/24-patient seen and examined, comfortable in bed, still has NG tube with suctioning.  Wants to try some food.  No other acute issues reported to me by staff.  Labs pending from today.    24-patient NG tube was removed, patient started on diet.  Potassium remains low being repleted.  No other acute issues reported to me by patient or staff at this time.      Objective:     BP (!) 149/75   Pulse 72   Temp 98.1 °F (36.7 °C) (Oral)   Resp 17   Ht 1.499 m (4' 11.02\")   Wt 77.3 kg (170 lb 6.4 oz)   SpO2 97%   BMI 34.40 kg/m²  O2 Flow Rate (L/min): 1 L/min      Temp (24hrs), Av.2 °F (36.8 °C), Min:98.1 °F (36.7 °C), Max:98.2 °F (36.8 °C)        PHYSICAL EXAM:  Gen WDWN  Neck Supple  CVS RRR  Resp Symmetric  including cbc/bmp or any other labs, imaging studies from today/yesterday if available, consultant notes from today/yesterday and vital signs to date as well as treatment rendered and patient's response to those treatments. Pt need IV fluids with additives , IV antibiotics if needed and Drug therapy requiring intensive monitoring for toxicity with labs and levels including but not limited to insulin with hypoglycemia glucose monitoring on sliding scale, heparin/Lovenox for DVT prophylaxis with periodic hemoglobin monitoring for bleeding issues. Personally reviewed in detail in conjunction w/ labs as documented for evidence of drug toxicity. In addition, prior medical, surgical and relevant social and family histories were reviewed. I have discussed management plan with patient/family/consultant and with nursing staff as much as feasible.          AD FC  DVT Prx - as per surgery team    Disposition-As per surgery team        This is dictation was done by dragon, computer voice recognition software.  Quite often unanticipated grammatical, syntax, homophones and other interpretive errors or inadvertently transcribed by the computer software.  Please excuse errors that have escaped final proofreading. Please contact me for any questions or details.  Thank you.     Signed By: Daniel Trejo MD     November 4, 2024

## 2024-11-04 NOTE — PLAN OF CARE
Problem: Pain  Goal: Verbalizes/displays adequate comfort level or baseline comfort level  11/4/2024 0016 by Ricky Vanegas RN  Outcome: Progressing  11/3/2024 1059 by Vu Cullen RN  Outcome: Progressing     Problem: Safety - Adult  Goal: Free from fall injury  11/4/2024 0016 by Ricky Vanegas RN  Outcome: Progressing  11/3/2024 1059 by Vu Cullen RN  Outcome: Progressing     Problem: Skin/Tissue Integrity - Adult  Goal: Skin integrity remains intact  11/4/2024 0016 by Ricky Vanegas RN  Outcome: Progressing  11/3/2024 1059 by Vu Cullen RN  Outcome: Progressing  Goal: Incisions, wounds, or drain sites healing without S/S of infection  Outcome: Progressing  Goal: Oral mucous membranes remain intact  Outcome: Progressing

## 2024-11-04 NOTE — PROGRESS NOTES
Tobi (Sivan)    CBC with Auto Differential    Collection Time: 11/03/24 10:43 AM   Result Value Ref Range    WBC 4.1 3.6 - 11.0 K/uL    RBC 2.98 (L) 3.80 - 5.20 M/uL    Hemoglobin 8.7 (L) 11.5 - 16.0 g/dL    Hematocrit 27.2 (L) 35.0 - 47.0 %    MCV 91.3 80.0 - 99.0 FL    MCH 29.2 26.0 - 34.0 PG    MCHC 32.0 30.0 - 36.5 g/dL    RDW 14.9 (H) 11.5 - 14.5 %    Platelets 190 150 - 400 K/uL    MPV 10.1 8.9 - 12.9 FL    Nucleated RBCs 0.0 0.0  WBC    nRBC 0.00 0.00 - 0.01 K/uL    Neutrophils % 73 32 - 75 %    Lymphocytes % 11 (L) 12 - 49 %    Monocytes % 10 5 - 13 %    Eosinophils % 3 0 - 7 %    Basophils % 1 0 - 1 %    Immature Granulocytes % 2 (H) 0 - 0.5 %    Neutrophils Absolute 3.0 1.8 - 8.0 K/UL    Lymphocytes Absolute 0.4 (L) 0.8 - 3.5 K/UL    Monocytes Absolute 0.4 0.0 - 1.0 K/UL    Eosinophils Absolute 0.1 0.0 - 0.4 K/UL    Basophils Absolute 0.0 0.0 - 0.1 K/UL    Immature Granulocytes Absolute 0.1 (H) 0.00 - 0.04 K/UL    Differential Type AUTOMATED     Comprehensive Metabolic Panel    Collection Time: 11/03/24 10:43 AM   Result Value Ref Range    Sodium 147 (H) 136 - 145 mmol/L    Potassium 3.2 (L) 3.5 - 5.1 mmol/L    Chloride 117 (H) 97 - 108 mmol/L    CO2 27 21 - 32 mmol/L    Anion Gap 3 2 - 12 mmol/L    Glucose 132 (H) 65 - 100 mg/dL    BUN 16 6 - 20 mg/dL    Creatinine 0.84 0.55 - 1.02 mg/dL    BUN/Creatinine Ratio 19 12 - 20      Est, Glom Filt Rate 73 >60 ml/min/1.73m2    Calcium 8.8 8.5 - 10.1 mg/dL    Total Bilirubin 0.6 0.2 - 1.0 mg/dL    AST 27 15 - 37 U/L    ALT 32 12 - 78 U/L    Alk Phosphatase 63 45 - 117 U/L    Total Protein 5.8 (L) 6.4 - 8.2 g/dL    Albumin 2.6 (L) 3.5 - 5.0 g/dL    Globulin 3.2 2.0 - 4.0 g/dL    Albumin/Globulin Ratio 0.8 (L) 1.1 - 2.2     POCT Glucose    Collection Time: 11/03/24 11:45 AM   Result Value Ref Range    POC Glucose 125 (H) 65 - 100 mg/dL    Performed by: Ankush Hendrickson)    POCT Glucose    Collection Time: 11/03/24  4:10 PM   Result Value Ref Range  (L) 1.1 - 2.2     POCT Glucose    Collection Time: 11/04/24  5:33 AM   Result Value Ref Range    POC Glucose 138 (H) 65 - 100 mg/dL    Performed by: Romina García        ASSESSMENT:   Patient is 74 y.o. with diagnosis of : Principal Problem:    Colostomy status (HCC)  Active Problems:    Ventral hernia without obstruction or gangrene  Resolved Problems:    * No resolved hospital problems. *      PLAN:                 There is a small area of oozing with blood between the staple line.  I placed some pressure dressing there.    Colostomy site looks clean.    Will try full  liquid diet today.    Continue aggressive pulmonary toiletry and incentive spirometry.    We will replace hypokalemia.    Hemoglobin slightly lower we will keep an eye on that.  Ambulate today.

## 2024-11-04 NOTE — PROGRESS NOTES
Physician Progress Note      PATIENT:               TIRCIA WHITT  [facility] #:                  [id number]  :                       1950  ADMIT DATE:       10/30/2024 11:48 AM  DISCH DATE:  RESPONDING  PROVIDER #:        Berto Yen MD          QUERY TEXT:    Patient admitted for  and  s/p    colostomy  reversal,  hernia  repair.   Patient  noted to have creatine   level  of   1.47.  If possible, please   document in progress notes and discharge summary if you are evaluating and/or   treating any of the following:    The medical record reflects the following:  Risk Factors: Post  op;   ex  lap;   CKD;   HTN;  Clinical Indicators: creat  level  10/30-   0.95;      today  10/31-    level     1.47;  Treatment: NS  with  40  meq  KCL   at  50  cc/hr    Thank you  very  much  Options provided:  -- abnormal  creatinine  level   due  to   possible  ZULMA  -- abnormal  creatinine  level   not clinically significant  -- Other - I will add my own diagnosis  -- Disagree - Not applicable / Not valid  -- Disagree - Clinically unable to determine / Unknown  -- Refer to Clinical Documentation Reviewer    PROVIDER RESPONSE TEXT:    This patient has abnormal creatinine level due to possible ZULMA    Query created by: Michelle Chauhan on 10/31/2024 11:36 AM      Electronically signed by:  Berto Yen MD 2024 3:09 AM

## 2024-11-04 NOTE — PLAN OF CARE
Problem: ABCDS Injury Assessment  Goal: Absence of physical injury  11/4/2024 0827 by Breanna Huffman LPN  Outcome: Progressing  11/4/2024 0016 by Ricky Vanegas RN  Outcome: Progressing     Problem: Safety - Adult  Goal: Free from fall injury  11/4/2024 0827 by Breanna Huffman LPN  Outcome: Progressing  11/4/2024 0016 by Ricky Vanegas RN  Outcome: Progressing

## 2024-11-04 NOTE — CARE COORDINATION
Pt remains on 5E, has IRF recs, require OT eval for auth. CM spoke w/ pt/family about a back up choice for SNF if insurance denies. Pt/family declines in favor of HH. They have worked w/ an agency in the past and will provide name tomorrow.

## 2024-11-04 NOTE — PROGRESS NOTES
Physician Progress Note      PATIENT:               TRICIA WHITT  [facility] #:                  [id number]  :                       1950  ADMIT DATE:       10/30/2024 11:48 AM  DISCH DATE:  RESPONDING  PROVIDER #:        Berto Yen MD          QUERY TEXT:    Patient admitted for  and  s/p  colostomy  reversal,  ventral hernia  repair.   . Noted documentation of acute respiratory failure with hypoxia  in  progress   note   .   In order to support the diagnosis of acute respiratory failure,   please include additional clinical indicators in your documentation.  Or   please document if the diagnosis of acute respiratory failure has been ruled   out after further study.    The medical record reflects the following:  Risk Factors: post op  Clinical Indicators: per  hospitalist\"  Patient was admitted to ICU following   procedure for postop pulmonary respiratory distress and hypoxia. \"  per  critical  care \" Post procedure CXR noted to have atelectasis with   elevated right diaphragm.  no apparent distress. She is on 2L NC. No   respiratory distress  Post op pulmonary insufficiency/atelectasis  Treatment: On 2L NC spo2 100, plans to wean  Incentive spirometry every 2 hours    Thank you  very  much  Options provided:  -- Acute Respiratory Failure as evidenced by, Please document evidence.  -- Acute Respiratory Failure ruled out after study  -- Other - I will add my own diagnosis  -- Disagree - Not applicable / Not valid  -- Disagree - Clinically unable to determine / Unknown  -- Refer to Clinical Documentation Reviewer    PROVIDER RESPONSE TEXT:    This patient is in acute respiratory failure as evidenced by cxr finding and   hypoxia    Query created by: Michelle Chauhan on 2024 8:20 AM      Electronically signed by:  Berto Yen MD 2024 3:56 PM

## 2024-11-05 LAB
ALBUMIN SERPL-MCNC: 2.5 G/DL (ref 3.5–5)
ALBUMIN/GLOB SERPL: 0.9 (ref 1.1–2.2)
ALP SERPL-CCNC: 58 U/L (ref 45–117)
ALT SERPL-CCNC: 26 U/L (ref 12–78)
ANION GAP SERPL CALC-SCNC: 6 MMOL/L (ref 2–12)
AST SERPL W P-5'-P-CCNC: 23 U/L (ref 15–37)
BASOPHILS # BLD: 0 K/UL (ref 0–0.1)
BASOPHILS NFR BLD: 0 % (ref 0–1)
BILIRUB SERPL-MCNC: 0.5 MG/DL (ref 0.2–1)
BUN SERPL-MCNC: 8 MG/DL (ref 6–20)
BUN/CREAT SERPL: 10 (ref 12–20)
CA-I BLD-MCNC: 8.9 MG/DL (ref 8.5–10.1)
CHLORIDE SERPL-SCNC: 117 MMOL/L (ref 97–108)
CO2 SERPL-SCNC: 23 MMOL/L (ref 21–32)
CREAT SERPL-MCNC: 0.78 MG/DL (ref 0.55–1.02)
DIFFERENTIAL METHOD BLD: ABNORMAL
EOSINOPHIL # BLD: 0 K/UL (ref 0–0.4)
EOSINOPHIL NFR BLD: 1 % (ref 0–7)
ERYTHROCYTE [DISTWIDTH] IN BLOOD BY AUTOMATED COUNT: 14.8 % (ref 11.5–14.5)
GLOBULIN SER CALC-MCNC: 2.7 G/DL (ref 2–4)
GLUCOSE BLD STRIP.AUTO-MCNC: 101 MG/DL (ref 65–100)
GLUCOSE BLD STRIP.AUTO-MCNC: 107 MG/DL (ref 65–100)
GLUCOSE BLD STRIP.AUTO-MCNC: 115 MG/DL (ref 65–100)
GLUCOSE BLD STRIP.AUTO-MCNC: 121 MG/DL (ref 65–100)
GLUCOSE BLD STRIP.AUTO-MCNC: 126 MG/DL (ref 65–100)
GLUCOSE BLD STRIP.AUTO-MCNC: 156 MG/DL (ref 65–100)
GLUCOSE SERPL-MCNC: 126 MG/DL (ref 65–100)
HCT VFR BLD AUTO: 26.3 % (ref 35–47)
HGB BLD-MCNC: 8.3 G/DL (ref 11.5–16)
IMM GRANULOCYTES # BLD AUTO: 0 K/UL
IMM GRANULOCYTES NFR BLD AUTO: 0 %
LYMPHOCYTES # BLD: 0.4 K/UL (ref 0.8–3.5)
LYMPHOCYTES NFR BLD: 10 % (ref 12–49)
MAGNESIUM SERPL-MCNC: 1.7 MG/DL (ref 1.6–2.4)
MCH RBC QN AUTO: 29.1 PG (ref 26–34)
MCHC RBC AUTO-ENTMCNC: 31.6 G/DL (ref 30–36.5)
MCV RBC AUTO: 92.3 FL (ref 80–99)
MONOCYTES # BLD: 0.1 K/UL (ref 0–1)
MONOCYTES NFR BLD: 3 % (ref 5–13)
NEUTS BAND NFR BLD MANUAL: 1 % (ref 0–6)
NEUTS SEG # BLD: 3.8 K/UL (ref 1.8–8)
NEUTS SEG NFR BLD: 85 % (ref 32–75)
NRBC # BLD: 0 K/UL (ref 0–0.01)
NRBC BLD-RTO: 0 PER 100 WBC
PERFORMED BY:: ABNORMAL
PLATELET # BLD AUTO: 178 K/UL (ref 150–400)
PMV BLD AUTO: 10.3 FL (ref 8.9–12.9)
POTASSIUM SERPL-SCNC: 3.7 MMOL/L (ref 3.5–5.1)
PROT SERPL-MCNC: 5.2 G/DL (ref 6.4–8.2)
RBC # BLD AUTO: 2.85 M/UL (ref 3.8–5.2)
RBC MORPH BLD: ABNORMAL
SODIUM SERPL-SCNC: 146 MMOL/L (ref 136–145)
WBC # BLD AUTO: 4.3 K/UL (ref 3.6–11)

## 2024-11-05 PROCEDURE — 2500000003 HC RX 250 WO HCPCS: Performed by: HOSPITALIST

## 2024-11-05 PROCEDURE — 80053 COMPREHEN METABOLIC PANEL: CPT

## 2024-11-05 PROCEDURE — 2500000003 HC RX 250 WO HCPCS: Performed by: NURSE PRACTITIONER

## 2024-11-05 PROCEDURE — 6370000000 HC RX 637 (ALT 250 FOR IP): Performed by: HOSPITALIST

## 2024-11-05 PROCEDURE — 85025 COMPLETE CBC W/AUTO DIFF WBC: CPT

## 2024-11-05 PROCEDURE — 6360000002 HC RX W HCPCS: Performed by: SURGERY

## 2024-11-05 PROCEDURE — 1100000000 HC RM PRIVATE

## 2024-11-05 PROCEDURE — 2580000003 HC RX 258: Performed by: SURGERY

## 2024-11-05 PROCEDURE — 36415 COLL VENOUS BLD VENIPUNCTURE: CPT

## 2024-11-05 PROCEDURE — 99024 POSTOP FOLLOW-UP VISIT: CPT | Performed by: SURGERY

## 2024-11-05 PROCEDURE — 82962 GLUCOSE BLOOD TEST: CPT

## 2024-11-05 PROCEDURE — 2580000003 HC RX 258: Performed by: NURSE PRACTITIONER

## 2024-11-05 PROCEDURE — 6370000000 HC RX 637 (ALT 250 FOR IP): Performed by: SURGERY

## 2024-11-05 PROCEDURE — 83735 ASSAY OF MAGNESIUM: CPT

## 2024-11-05 RX ADMIN — SODIUM CHLORIDE, PRESERVATIVE FREE 10 ML: 5 INJECTION INTRAVENOUS at 08:57

## 2024-11-05 RX ADMIN — OXYCODONE AND ACETAMINOPHEN 1 TABLET: 5; 325 TABLET ORAL at 04:52

## 2024-11-05 RX ADMIN — PIPERACILLIN AND TAZOBACTAM 3375 MG: 3; .375 INJECTION, POWDER, LYOPHILIZED, FOR SOLUTION INTRAVENOUS at 03:05

## 2024-11-05 RX ADMIN — OXYCODONE AND ACETAMINOPHEN 1 TABLET: 5; 325 TABLET ORAL at 15:43

## 2024-11-05 RX ADMIN — OXYCODONE AND ACETAMINOPHEN 1 TABLET: 5; 325 TABLET ORAL at 20:32

## 2024-11-05 RX ADMIN — ROSUVASTATIN CALCIUM 20 MG: 20 TABLET, FILM COATED ORAL at 08:47

## 2024-11-05 RX ADMIN — DEXTROSE MONOHYDRATE, SODIUM CHLORIDE, SODIUM LACTATE, POTASSIUM CHLORIDE, CALCIUM CHLORIDE: 5; 600; 310; 179; 20 INJECTION, SOLUTION INTRAVENOUS at 03:01

## 2024-11-05 RX ADMIN — PIPERACILLIN AND TAZOBACTAM 3375 MG: 3; .375 INJECTION, POWDER, LYOPHILIZED, FOR SOLUTION INTRAVENOUS at 18:56

## 2024-11-05 RX ADMIN — SODIUM CHLORIDE, PRESERVATIVE FREE 10 ML: 5 INJECTION INTRAVENOUS at 20:37

## 2024-11-05 RX ADMIN — OXYCODONE AND ACETAMINOPHEN 1 TABLET: 5; 325 TABLET ORAL at 08:47

## 2024-11-05 RX ADMIN — LISINOPRIL 20 MG: 20 TABLET ORAL at 08:47

## 2024-11-05 RX ADMIN — ESCITALOPRAM OXALATE 20 MG: 10 TABLET ORAL at 08:47

## 2024-11-05 RX ADMIN — SODIUM CHLORIDE, PRESERVATIVE FREE 20 MG: 5 INJECTION INTRAVENOUS at 08:47

## 2024-11-05 RX ADMIN — PIPERACILLIN AND TAZOBACTAM 3375 MG: 3; .375 INJECTION, POWDER, LYOPHILIZED, FOR SOLUTION INTRAVENOUS at 08:46

## 2024-11-05 RX ADMIN — OXYCODONE AND ACETAMINOPHEN 1 TABLET: 5; 325 TABLET ORAL at 00:32

## 2024-11-05 RX ADMIN — DEXTROSE MONOHYDRATE, SODIUM CHLORIDE, SODIUM LACTATE, POTASSIUM CHLORIDE, CALCIUM CHLORIDE: 5; 600; 310; 179; 20 INJECTION, SOLUTION INTRAVENOUS at 15:41

## 2024-11-05 ASSESSMENT — PAIN DESCRIPTION - DESCRIPTORS
DESCRIPTORS: ACHING
DESCRIPTORS: DISCOMFORT;SHARP
DESCRIPTORS: THROBBING
DESCRIPTORS: ACHING

## 2024-11-05 ASSESSMENT — PAIN SCALES - GENERAL
PAINLEVEL_OUTOF10: 5
PAINLEVEL_OUTOF10: 5
PAINLEVEL_OUTOF10: 7
PAINLEVEL_OUTOF10: 5
PAINLEVEL_OUTOF10: 5
PAINLEVEL_OUTOF10: 9
PAINLEVEL_OUTOF10: 8
PAINLEVEL_OUTOF10: 9
PAINLEVEL_OUTOF10: 0
PAINLEVEL_OUTOF10: 9
PAINLEVEL_OUTOF10: 5
PAINLEVEL_OUTOF10: 9
PAINLEVEL_OUTOF10: 8

## 2024-11-05 ASSESSMENT — PAIN DESCRIPTION - LOCATION
LOCATION: ABDOMEN
LOCATION: ABDOMEN
LOCATION: BACK;ABDOMEN
LOCATION: ABDOMEN

## 2024-11-05 ASSESSMENT — PAIN DESCRIPTION - ORIENTATION
ORIENTATION: MID
ORIENTATION: ANTERIOR
ORIENTATION: MID
ORIENTATION: ANTERIOR;POSTERIOR
ORIENTATION: ANTERIOR
ORIENTATION: MID

## 2024-11-05 ASSESSMENT — PAIN DESCRIPTION - PAIN TYPE: TYPE: SURGICAL PAIN

## 2024-11-05 NOTE — PROGRESS NOTES
PROGRESS NOTE      Chief Complaints:  No new complaints  HPI and  Objective:    Tolerating full liquid   Review of Systems:  Rest of review of system reviewed personally and they are negative.    EXAM:  BP (!) 141/56   Pulse 68   Temp 97.9 °F (36.6 °C) (Oral)   Resp 18   Ht 1.499 m (4' 11.02\")   Wt 77.3 kg (170 lb 6.4 oz)   SpO2 100%   BMI 34.40 kg/m²     Patient is awake   Head and neck atraumatic, normocephalic.  ENT: No hoarse voice, gaze appropriate.  Cardiac system regular rate rhythm.  Pulmonary no audible wheeze, no cyanosis.  Chest wall excursion normal with respiration cycle  Abdomen is soft not particularly distended.  Neurologically nonfocal.  Hematology system: No bruising noted.  Musculoskeletal system: No joint deformity noted.  Genitourinary system: No hematuria noted.  Skin is warm and moist.  Psychosocial: Cooperative.  Vascular examination as previously noted no changes.    Current Facility-Administered Medications   Medication Dose Route Frequency Provider Last Rate Last Admin    oxyCODONE-acetaminophen (PERCOCET) 5-325 MG per tablet 1 tablet  1 tablet Oral Q4H PRN Berto Yen MD   1 tablet at 11/05/24 0847    dextrose 5% in lactated ringers with KCl 20 mEq infusion   IntraVENous Continuous Daniel Trejo  mL/hr at 11/05/24 0301 New Bag at 11/05/24 0301    escitalopram (LEXAPRO) tablet 20 mg  20 mg Oral Daily Daniel Trejo MD   20 mg at 11/05/24 0847    rosuvastatin (CRESTOR) tablet 20 mg  20 mg Oral Daily Daniel Trejo MD   20 mg at 11/05/24 0847    lisinopril (PRINIVIL;ZESTRIL) tablet 20 mg  20 mg Oral Daily Daniel Trejo MD   20 mg at 11/05/24 0847    ipratropium 0.5 mg-albuterol 2.5 mg (DUONEB) nebulizer solution 1 Dose  1 Dose Inhalation Q12H PRN Angel Villegas MD        [Held by provider] enoxaparin (LOVENOX) injection 40 mg  40 mg SubCUTAneous Daily Berto Yen MD   40 mg at 11/03/24 0936    albuterol (PROVENTIL) nebulizer solution 2.5 mg  2.5 mg Nebulization Q6H PRN Farshad,

## 2024-11-05 NOTE — PLAN OF CARE
Problem: Safety - Adult  Goal: Free from fall injury  11/4/2024 2115 by Ne Diaz, RN  Outcome: Progressing  11/4/2024 0827 by Breanna Huffman LPN  Outcome: Progressing     Problem: Pain  Goal: Verbalizes/displays adequate comfort level or baseline comfort level  Outcome: Progressing

## 2024-11-05 NOTE — PROGRESS NOTES
Hospitalist Progress Note    NAME:   Britta Quevedo   : 1950   MRN: [id number]     Subjective:   Daily Progress Note:  2024    Chief complaint:No chief complaint on file.  Medical consult follow-up for hypoxia      Hospital course to date/HPI from H&P:  Britta Quevedo is a 74 y.o.  female with PMHx significant for bowel perf during colonoscopy requiring sigmoid colon resection and colostomy placement on 2024, CKD stage 3A, depression, HLD, HTN, rheumatoid arthritis, and DM controlled with diet admitted on 10/30 post-op elective colostomy reversal and ventral hernia repair.  Patient was admitted to ICU following procedure for postop pulmonary respiratory distress and hypoxia. She was transferred out of ICU on 10/31. She is n.p.o. with NG tube in place until GI function normalizes.  Patient states that she is urinating but has not yet had a bowel movement.  Surgical service has requested hospitalist evaluation for medical management.  Patient was found to have hyperkalemia although repeat lab was normal.       24-patient transferred out of ICU yesterday evening, currently comfortable in bed.  Has NG tube.  No acute issues reported to me by patient or staff.    11/3/24-patient seen and examined, comfortable in bed, still has NG tube with suctioning.  Wants to try some food.  No other acute issues reported to me by staff.  Labs pending from today.    24-patient NG tube was removed, patient started on diet.  Potassium remains low being repleted.  No other acute issues reported to me by patient or staff at this time.    24-patient tolerating diet per chart overall no acute issues reported to me by patient at this time.      Objective:     BP (!) 141/56   Pulse 68   Temp 97.9 °F (36.6 °C) (Oral)   Resp 18   Ht 1.499 m (4' 11.02\")   Wt 77.3 kg (170 lb 6.4 oz)   SpO2 100%   BMI 34.40 kg/m²  O2 Flow Rate (L/min): 1 L/min      Temp (24hrs), Av.2 °F (36.8 °C), Min:97.9 °F (36.6

## 2024-11-06 VITALS
SYSTOLIC BLOOD PRESSURE: 144 MMHG | TEMPERATURE: 98.4 F | HEIGHT: 59 IN | BODY MASS INDEX: 34.35 KG/M2 | DIASTOLIC BLOOD PRESSURE: 60 MMHG | WEIGHT: 170.4 LBS | HEART RATE: 83 BPM | OXYGEN SATURATION: 97 % | RESPIRATION RATE: 16 BRPM

## 2024-11-06 LAB
ALBUMIN SERPL-MCNC: 2.5 G/DL (ref 3.5–5)
ALBUMIN/GLOB SERPL: 0.9 (ref 1.1–2.2)
ALP SERPL-CCNC: 60 U/L (ref 45–117)
ALT SERPL-CCNC: 26 U/L (ref 12–78)
ANION GAP SERPL CALC-SCNC: 5 MMOL/L (ref 2–12)
AST SERPL W P-5'-P-CCNC: 20 U/L (ref 15–37)
BASOPHILS # BLD: 0 K/UL (ref 0–0.1)
BASOPHILS NFR BLD: 0 % (ref 0–1)
BILIRUB SERPL-MCNC: 0.4 MG/DL (ref 0.2–1)
BUN SERPL-MCNC: 8 MG/DL (ref 6–20)
BUN/CREAT SERPL: 9 (ref 12–20)
CA-I BLD-MCNC: 9.2 MG/DL (ref 8.5–10.1)
CHLORIDE SERPL-SCNC: 115 MMOL/L (ref 97–108)
CO2 SERPL-SCNC: 25 MMOL/L (ref 21–32)
CREAT SERPL-MCNC: 0.89 MG/DL (ref 0.55–1.02)
DIFFERENTIAL METHOD BLD: ABNORMAL
EOSINOPHIL # BLD: 0 K/UL (ref 0–0.4)
EOSINOPHIL NFR BLD: 0 % (ref 0–7)
ERYTHROCYTE [DISTWIDTH] IN BLOOD BY AUTOMATED COUNT: 14.8 % (ref 11.5–14.5)
GLOBULIN SER CALC-MCNC: 2.9 G/DL (ref 2–4)
GLUCOSE BLD STRIP.AUTO-MCNC: 109 MG/DL (ref 65–100)
GLUCOSE BLD STRIP.AUTO-MCNC: 116 MG/DL (ref 65–100)
GLUCOSE SERPL-MCNC: 111 MG/DL (ref 65–100)
HCT VFR BLD AUTO: 25.9 % (ref 35–47)
HGB BLD-MCNC: 8.4 G/DL (ref 11.5–16)
IMM GRANULOCYTES # BLD AUTO: 0 K/UL
IMM GRANULOCYTES NFR BLD AUTO: 0 %
LYMPHOCYTES # BLD: 0.8 K/UL (ref 0.8–3.5)
LYMPHOCYTES NFR BLD: 15 % (ref 12–49)
MCH RBC QN AUTO: 29.8 PG (ref 26–34)
MCHC RBC AUTO-ENTMCNC: 32.4 G/DL (ref 30–36.5)
MCV RBC AUTO: 91.8 FL (ref 80–99)
MONOCYTES # BLD: 0.3 K/UL (ref 0–1)
MONOCYTES NFR BLD: 5 % (ref 5–13)
NEUTS SEG # BLD: 4.3 K/UL (ref 1.8–8)
NEUTS SEG NFR BLD: 80 % (ref 32–75)
NRBC # BLD: 0.02 K/UL (ref 0–0.01)
NRBC BLD-RTO: 0.4 PER 100 WBC
PERFORMED BY:: ABNORMAL
PERFORMED BY:: ABNORMAL
PLATELET # BLD AUTO: 185 K/UL (ref 150–400)
PMV BLD AUTO: 10.1 FL (ref 8.9–12.9)
POTASSIUM SERPL-SCNC: 3.6 MMOL/L (ref 3.5–5.1)
PROT SERPL-MCNC: 5.4 G/DL (ref 6.4–8.2)
RBC # BLD AUTO: 2.82 M/UL (ref 3.8–5.2)
RBC MORPH BLD: ABNORMAL
SODIUM SERPL-SCNC: 145 MMOL/L (ref 136–145)
WBC # BLD AUTO: 5.4 K/UL (ref 3.6–11)

## 2024-11-06 PROCEDURE — 2500000003 HC RX 250 WO HCPCS: Performed by: NURSE PRACTITIONER

## 2024-11-06 PROCEDURE — 97530 THERAPEUTIC ACTIVITIES: CPT

## 2024-11-06 PROCEDURE — 82962 GLUCOSE BLOOD TEST: CPT

## 2024-11-06 PROCEDURE — 2500000003 HC RX 250 WO HCPCS: Performed by: HOSPITALIST

## 2024-11-06 PROCEDURE — 6370000000 HC RX 637 (ALT 250 FOR IP): Performed by: SURGERY

## 2024-11-06 PROCEDURE — 2580000003 HC RX 258: Performed by: SURGERY

## 2024-11-06 PROCEDURE — 36415 COLL VENOUS BLD VENIPUNCTURE: CPT

## 2024-11-06 PROCEDURE — 6360000002 HC RX W HCPCS: Performed by: SURGERY

## 2024-11-06 PROCEDURE — 6370000000 HC RX 637 (ALT 250 FOR IP): Performed by: HOSPITALIST

## 2024-11-06 PROCEDURE — 2580000003 HC RX 258: Performed by: NURSE PRACTITIONER

## 2024-11-06 PROCEDURE — 80053 COMPREHEN METABOLIC PANEL: CPT

## 2024-11-06 PROCEDURE — 85025 COMPLETE CBC W/AUTO DIFF WBC: CPT

## 2024-11-06 RX ORDER — OXYCODONE AND ACETAMINOPHEN 5; 325 MG/1; MG/1
1 TABLET ORAL EVERY 8 HOURS PRN
Qty: 21 TABLET | Refills: 0 | Status: SHIPPED | OUTPATIENT
Start: 2024-11-06 | End: 2024-11-13

## 2024-11-06 RX ADMIN — PIPERACILLIN AND TAZOBACTAM 3375 MG: 3; .375 INJECTION, POWDER, LYOPHILIZED, FOR SOLUTION INTRAVENOUS at 02:32

## 2024-11-06 RX ADMIN — LISINOPRIL 20 MG: 20 TABLET ORAL at 08:42

## 2024-11-06 RX ADMIN — SODIUM CHLORIDE, PRESERVATIVE FREE 20 MG: 5 INJECTION INTRAVENOUS at 08:41

## 2024-11-06 RX ADMIN — DEXTROSE MONOHYDRATE, SODIUM CHLORIDE, SODIUM LACTATE, POTASSIUM CHLORIDE, CALCIUM CHLORIDE: 5; 600; 310; 179; 20 INJECTION, SOLUTION INTRAVENOUS at 06:05

## 2024-11-06 RX ADMIN — OXYCODONE AND ACETAMINOPHEN 1 TABLET: 5; 325 TABLET ORAL at 02:34

## 2024-11-06 RX ADMIN — OXYCODONE AND ACETAMINOPHEN 1 TABLET: 5; 325 TABLET ORAL at 06:32

## 2024-11-06 RX ADMIN — OXYCODONE AND ACETAMINOPHEN 1 TABLET: 5; 325 TABLET ORAL at 11:24

## 2024-11-06 RX ADMIN — PIPERACILLIN AND TAZOBACTAM 3375 MG: 3; .375 INJECTION, POWDER, LYOPHILIZED, FOR SOLUTION INTRAVENOUS at 08:44

## 2024-11-06 RX ADMIN — ESCITALOPRAM OXALATE 20 MG: 10 TABLET ORAL at 08:42

## 2024-11-06 RX ADMIN — OXYCODONE AND ACETAMINOPHEN 1 TABLET: 5; 325 TABLET ORAL at 16:45

## 2024-11-06 RX ADMIN — ROSUVASTATIN CALCIUM 20 MG: 20 TABLET, FILM COATED ORAL at 08:42

## 2024-11-06 ASSESSMENT — PAIN SCALES - WONG BAKER: WONGBAKER_NUMERICALRESPONSE: NO HURT

## 2024-11-06 ASSESSMENT — PAIN SCALES - GENERAL
PAINLEVEL_OUTOF10: 8
PAINLEVEL_OUTOF10: 4
PAINLEVEL_OUTOF10: 6
PAINLEVEL_OUTOF10: 5
PAINLEVEL_OUTOF10: 8
PAINLEVEL_OUTOF10: 5
PAINLEVEL_OUTOF10: 8
PAINLEVEL_OUTOF10: 8
PAINLEVEL_OUTOF10: 9

## 2024-11-06 ASSESSMENT — PAIN DESCRIPTION - ORIENTATION
ORIENTATION: RIGHT;LOWER
ORIENTATION: ANTERIOR
ORIENTATION: RIGHT
ORIENTATION: ANTERIOR

## 2024-11-06 ASSESSMENT — PAIN DESCRIPTION - LOCATION
LOCATION: ABDOMEN

## 2024-11-06 ASSESSMENT — PAIN DESCRIPTION - DESCRIPTORS
DESCRIPTORS: THROBBING
DESCRIPTORS: ACHING
DESCRIPTORS: ACHING;DISCOMFORT

## 2024-11-06 NOTE — PLAN OF CARE
Problem: Safety - Adult  Goal: Free from fall injury  11/6/2024 1101 by Ne Diaz, RN  Outcome: Progressing  11/5/2024 2230 by Damion Hale, RN  Outcome: Progressing     Problem: Pain  Goal: Verbalizes/displays adequate comfort level or baseline comfort level  11/6/2024 1101 by Ne Diaz, RN  Outcome: Progressing  11/5/2024 2230 by Damion Hale, RN  Outcome: Progressing

## 2024-11-06 NOTE — PROGRESS NOTES
Hospitalist Progress Note    NAME:   Britta Quevedo   : 1950   MRN: [id number]     Subjective:   Daily Progress Note:  2024    Chief complaint:No chief complaint on file.  Medical consult follow-up for hypoxia      Hospital course to date/HPI from H&P:  Britta Quevedo is a 74 y.o.  female with PMHx significant for bowel perf during colonoscopy requiring sigmoid colon resection and colostomy placement on 2024, CKD stage 3A, depression, HLD, HTN, rheumatoid arthritis, and DM controlled with diet admitted on 10/30 post-op elective colostomy reversal and ventral hernia repair.  Patient was admitted to ICU following procedure for postop pulmonary respiratory distress and hypoxia. She was transferred out of ICU on 10/31. She is n.p.o. with NG tube in place until GI function normalizes.  Patient states that she is urinating but has not yet had a bowel movement.  Surgical service has requested hospitalist evaluation for medical management.  Patient was found to have hyperkalemia although repeat lab was normal.       24-patient transferred out of ICU yesterday evening, currently comfortable in bed.  Has NG tube.  No acute issues reported to me by patient or staff.    11/3/24-patient seen and examined, comfortable in bed, still has NG tube with suctioning.  Wants to try some food.  No other acute issues reported to me by staff.  Labs pending from today.    24-patient NG tube was removed, patient started on diet.  Potassium remains low being repleted.  No other acute issues reported to me by patient or staff at this time.    24-patient tolerating diet per chart overall no acute issues reported to me by patient at this time.       24-patient excited to go home today.  No other acute issues medically stable.  Advised to follow-up with PCP after discharge.      Objective:     BP (!) 151/61   Pulse 76   Temp 98.2 °F (36.8 °C) (Oral)   Resp 16   Ht 1.499 m (4' 11.02\")   Wt 77.3 kg  Detected       Culture, Urine [[id number]] Collected: 10/30/24 1446    Order Status: Completed Specimen: Urine, indwelling catheter Updated: 11/01/24 0648     Special Requests No Special Requests        Culture No Growth (<1000 cfu/mL)       MRSA by PCR [[id number]] Collected: 10/24/24 1400    Order Status: Completed Specimen: Swab Updated: 10/24/24 1616     MRSA by PCR Not Detected                Scheduled Meds:   escitalopram  20 mg Oral Daily    rosuvastatin  20 mg Oral Daily    lisinopril  20 mg Oral Daily    [Held by provider] enoxaparin  40 mg SubCUTAneous Daily    naloxone  0.2 mg IntraVENous Once    famotidine (PEPCID) injection  20 mg IntraVENous Daily    sodium chloride flush  5-40 mL IntraVENous 2 times per day    piperacillin-tazobactam  3,375 mg IntraVENous Q8H    insulin lispro  0-4 Units SubCUTAneous 4 times per day     Continuous Infusions:   dextrose 5% lactated ringers with KCl 20 mEq 100 mL/hr at 11/06/24 0605    sodium chloride      dextrose       PRN Meds:.oxyCODONE-acetaminophen, ipratropium 0.5 mg-albuterol 2.5 mg, albuterol, magnesium sulfate, sodium phosphate 11.79 mmol in sodium chloride 0.9 % 250 mL IVPB **OR** sodium phosphate 23.58 mmol in sodium chloride 0.9 % 250 mL IVPB, sodium chloride flush, sodium chloride, ondansetron **OR** ondansetron, hydrALAZINE, glucose, dextrose bolus **OR** dextrose bolus, glucagon (rDNA), dextrose      XR CHEST PORTABLE--Result Date: 11/1/2024  No acute cardiopulmonary process. Electronically signed by Imer Crawley         Assessment/Plan:   S/p colostomy reversal and ventral hernia repair  Management per surgery  S/p NG tube   Oral diet  tolerating  Zosyn per surgery can stop if no clinical indication       Acute respiratory failure with hypoxia  CXR now improved  Incentive spirometry every 2 hours  Maintain oxygen saturation 92%  Off O2     HTN-- monitor bp on home meds fu PCP       CKD stage 3A-Stable crt,  fu PCP    Hypo[place]-  repleted K  Fu PCP

## 2024-11-06 NOTE — CARE COORDINATION
DC Plan: Home with daughter and [facility]    Cm met with pt at the bedside. Cm informed her of anticipated dc for today. Cm provided pt with contact information for [facility]. IMM obtained.     Cm called pt's daughter - Salena [phone] and provided her with an update. Daughter had a question writer could not address. Cm informed daughter Cm will f/up with pt's nurse and have nurse call her.     Cm spoke with pt's nurse and asked her to call pt's daughter.

## 2024-11-06 NOTE — PLAN OF CARE
PHYSICAL THERAPY TREATMENT     Patient: Britta Quevedo (74 y.o. female)  Date: 11/6/2024  Diagnosis: Ventral hernia without obstruction or gangrene [K43.9]  Colostomy status (HCC) [Z93.3] Colostomy status (HCC)  Procedure(s) (LRB):  OPEN COLOSTOMY REVERSAL AND VENTRAL HERNIA REPAIR, appendectomy #1 exploratory laparotomy. 2.  Colostomy reversal with EEA 29 mm device. 3.  Right flank abdominal wall ventral hernia repair with 4 x 6 inch ventral light mesh. 4.  Lysis radiation. #5 interval appendectomy (N/A) 7 Days Post-Op  Precautions:                        Recommendations for nursing mobility: Out of bed to chair for meals, Encourage HEP in prep for ADLs/mobility; see handout for details, Frequent repositioning to prevent skin breakdown, AD and gt belt for bed to chair , Amb to bathroom with AD and gait belt, Amb in hallway, and Assist x1    In place during session: Peripheral IV, External Catheter, and DANIEL drain 1#  Chart, physical therapy assessment, plan of care and goals were reviewed.  ASSESSMENT  Patient continues with skilled PT services and is progressing towards goals. Pt sitting up in the be upon PT arrival, agreeable to session. Pt A&O x 4. (See below for objective details and assist levels).     Overall pt tolerated session well today.  Prior to OOB pt required assisted with pericare in bed and completed rolling SBA. Patient demos improved functional mobility today and was able to cont longer distance ambulation. Patient overall amb approx 450 ft with RW, CGA-SBA with no overt LOB. Occasional cueing for RW technique with IV lines as well as keeping RW closer. Otherwise demos improved functional mobility. Educated on seated therex and patient verbalized understanding and demonstrated. Will continue to benefit from skilled PT services, and will continue to progress as tolerated. Current PT DC recommendation Intermittent physical therapy up to 2-3x/week in previous living setting once medically  appropriate.     Start of Session End of Session   SPO2 (%)  96   Heart Rate (BPM)  95     GOALS:    Problem: Physical Therapy - Adult  Goal: By Discharge: Performs mobility at highest level of function for planned discharge setting.  See evaluation for individualized goals.  Description: FUNCTIONAL STATUS PRIOR TO ADMISSION: Patient was modified independent using a rolling walker/NO AD for functional mobility.    HOME SUPPORT PRIOR TO ADMISSION: Patient lives with daughter and was indep at home    Physical Therapy Goals  Initiated 11/1/2024  Pt stated goal: Get better  Pt will be I with LE HEP in 7 days.  Pt will perform bed mobility with Modified [place] in 7 days.  Pt will perform transfers with Modified [place] in 7 days.   Pt will amb 150 feet with LRAD safely with Modified [place] in 7 days.    Pt will verbalize and demonstrate compliance with fall/surgical precautions in 7 days.   Pt will demonstrate improvement in standing/gait balance from fair to good in 7 days.    Outcome: Progressing          PLAN :  Patient continues to benefit from skilled intervention to address functional impairments. Continue treatment per established plan of care to address goals.    Recommendation for discharge: (in order for the patient to meet his/her long term goals)  Intermittent physical therapy up to 2-3x/week in previous living setting    Potential barriers for safe discharge: concern for pt safely navigating or managing the home environment.    IF patient discharges home will need the following DME:rolling walker     SUBJECTIVE:   Patient stated “I am just waiting for the doctor, I might go home”    OBJECTIVE DATA SUMMARY:   Critical Behavior:  Orientation  Overall Orientation Status: Within Normal Limits  Orientation Level: Oriented X4  Cognition  Overall Cognitive Status: WNL    Functional Mobility Training:  Bed Mobility:  Bed Mobility Training  Interventions: Safety awareness training;Weight shifting

## 2024-11-06 NOTE — PROGRESS NOTES
Discharge plan of care/case management plan validated with provider discharge order.  Removed patient PICC line double lumen, no complication, no tele, no montez.   DANIEL drain removed-pt tolerated well. New dressing applied to incisions.   Discharge instructions given to the daughter, questions answered.

## 2024-11-06 NOTE — PROGRESS NOTES
4 Eyes Skin Assessment     NAME:  Britta Quevedo  YOB: 1950  MEDICAL RECORD NUMBER:  [id number]    The patient is being assessed for  Weekly Skin Assessment    I agree that at least one RN has performed a thorough Head to Toe Skin Assessment on the patient. ALL assessment sites listed below have been assessed.      Areas assessed by both nurses:    Head, Face, Ears, Shoulders, Back, Chest, Arms, Elbows, Hands, Sacrum. Buttock, Coccyx, Ischium, Legs. Feet and Heels, and Under Medical Devices         Does the Patient have a Wound? Yes wound(s) were present on assessment. LDA wound assessment was Initiated and completed by RN  Surgical Incision-abdomen       Celestine Prevention initiated by RN: Yes  Wound Care Orders initiated by RN: Yes    Pressure Injury (Stage 3,4, Unstageable, DTI, NWPT, and Complex wounds) if present, place Wound referral order by RN under : No    New Ostomies, if present place, Ostomy referral order under : No     Nurse 1 eSignature: Electronically signed by Ne Diaz RN on 11/6/24 at 3:45 PM EST    **SHARE this note so that the co-signing nurse can place an eSignature**    Nurse 2 eSignature: Electronically signed by Kathya Anderson RN on 11/6/24 at 3:58 PM EST

## 2024-11-07 ENCOUNTER — TELEPHONE (OUTPATIENT)
Age: 74
End: 2024-11-07

## 2024-11-07 NOTE — CARE COORDINATION
Transition of Care Plan:    RUR: 14%  Prior Level of Functioning: independent  Disposition: home health  NOE: 11/6/24  If SNF or IPR: Date FOC offered: N/A  Date FOC received: N/A  Accepting facility: N/A  Date authorization started with reference number: N/A  Date authorization received and expires: N/A  Follow up appointments: Yes  DME needed: None  Transportation at discharge: family  IM/IMM Medicare/ letter given: Yes  Is patient a [facility] and connected with [facility]? N/A   If yes, was [facility] transfer form completed and [facility] notified?   Caregiver Contact: Family  Discharge Caregiver contacted prior to discharge? Yes  Care Conference needed? No  Barriers to discharge: None

## 2024-11-07 NOTE — TELEPHONE ENCOUNTER
Patient called today. She was just discharged from hospital. She still has diarrhea. She is wanting to know what to do. [phone]

## 2024-11-07 NOTE — TELEPHONE ENCOUNTER
I spoke with  he would like for her to try OTC imodium. I called and spoke with Brtita Quevedo 2 patient identifiers used. I informed her to buy OTC Imodium. She thanked me for calling.

## 2024-11-07 NOTE — TELEPHONE ENCOUNTER
Patient calling back within the hour, told her we are in a busy clinic today if she is in pain go to hospital; and stating she really needs to speak with someone about the Diarrhea

## 2024-11-08 ENCOUNTER — TELEPHONE (OUTPATIENT)
Age: 74
End: 2024-11-08

## 2024-11-13 NOTE — ANESTHESIA POSTPROCEDURE EVALUATION
Department of Anesthesiology  Postprocedure Note    Patient: Britta Quevedo  MRN: [id number]  YOB: 1950    Procedure Summary       Date: 10/30/24 Room / Location: [facility] MAIN OR 05 / [facility] MAIN OR    Anesthesia Start: 1354 Anesthesia Stop: 1740    Procedure: OPEN COLOSTOMY REVERSAL AND VENTRAL HERNIA REPAIR, appendectomy #1 exploratory laparotomy. 2.  Colostomy reversal with EEA 29 mm device. 3.  Right flank abdominal wall ventral hernia repair with 4 x 6 inch ventral light mesh. 4.  Lysis radiation. #5 interval appendectomy (Abdomen) Diagnosis:       Ventral hernia without obstruction or gangrene      Colostomy status (HCC)      (Ventral hernia without obstruction or gangrene [K43.9])      (Colostomy status (HCC) [Z93.3])    Surgeons: Berto Yen MD Responsible Provider: Justyn Christopher MD    Anesthesia Type: General ASA Status: 3            Anesthesia Type: General    Vivi Phase I: Vivi Score: 9    Vivi Phase II:      Anesthesia Post Evaluation    Patient location during evaluation: PACU  Patient participation: complete - patient cannot participate  Level of consciousness: awake  Pain score: 0  Airway patency: patent  Nausea & Vomiting: no nausea and no vomiting  Cardiovascular status: hemodynamically stable  Respiratory status: acceptable  Hydration status: stable  Multimodal analgesia pain management approach        No notable events documented.

## 2024-11-14 ENCOUNTER — OFFICE VISIT (OUTPATIENT)
Age: 74
End: 2024-11-14

## 2024-11-14 VITALS
DIASTOLIC BLOOD PRESSURE: 73 MMHG | SYSTOLIC BLOOD PRESSURE: 139 MMHG | OXYGEN SATURATION: 97 % | BODY MASS INDEX: 32.76 KG/M2 | TEMPERATURE: 98.2 F | HEART RATE: 92 BPM | HEIGHT: 59 IN | WEIGHT: 162.5 LBS | RESPIRATION RATE: 18 BRPM

## 2024-11-14 DIAGNOSIS — G89.18 POST-OP PAIN: Primary | ICD-10-CM

## 2024-11-14 PROCEDURE — 99024 POSTOP FOLLOW-UP VISIT: CPT | Performed by: SURGERY

## 2024-11-14 ASSESSMENT — PATIENT HEALTH QUESTIONNAIRE - PHQ9
SUM OF ALL RESPONSES TO PHQ QUESTIONS 1-9: 0
SUM OF ALL RESPONSES TO PHQ QUESTIONS 1-9: 0
SUM OF ALL RESPONSES TO PHQ9 QUESTIONS 1 & 2: 0
SUM OF ALL RESPONSES TO PHQ QUESTIONS 1-9: 0
2. FEELING DOWN, DEPRESSED OR HOPELESS: NOT AT ALL
SUM OF ALL RESPONSES TO PHQ QUESTIONS 1-9: 0
1. LITTLE INTEREST OR PLEASURE IN DOING THINGS: NOT AT ALL

## 2024-11-14 NOTE — PROGRESS NOTES
1. Have you been to the ER, urgent care clinic since your last visit?  Hospitalized since your last visit?NO    2. Have you seen or consulted any other health care providers outside of the [facility] since your last visit?  Include any pap smears or colon screening. NO    Chief Complaint   Patient presents with    Post-Op Check

## 2024-11-18 DIAGNOSIS — G89.18 POST-OP PAIN: ICD-10-CM

## 2024-11-18 RX ORDER — OXYCODONE AND ACETAMINOPHEN 5; 325 MG/1; MG/1
TABLET ORAL
Qty: 21 TABLET | Refills: 0 | OUTPATIENT
Start: 2024-11-18

## 2024-11-18 NOTE — PROGRESS NOTES
Subjective:      Britta Quevedo is a 74 y.o. female who presents today for wound check.     Patient is here today follow-up colostomy reversal.  She is doing very well.  Diarrhea is improving.  Objective:     /73 (Site: Left Upper Arm, Position: Sitting, Cuff Size: Large Adult)   Pulse 92   Temp 98.2 °F (36.8 °C) (Oral)   Resp 18   Ht 1.499 m (4' 11\")   Wt 73.7 kg (162 lb 8 oz)   SpO2 97%   BMI 32.82 kg/m²     Wound exam:  Wounds are clean and dry.  Staples removed.  Assessment:   Etiology of Wound:    Postop close reversal and ventral hernia repair.  Plan:   Patient was advised to wear abdominal binder.  Patient will be reassessed in 3 months follow-up.

## 2024-11-22 ENCOUNTER — TELEPHONE (OUTPATIENT)
Age: 74
End: 2024-11-22

## 2024-11-22 NOTE — TELEPHONE ENCOUNTER
Contacted patient two identifiers let patient know per Dr. Yen he will send pain medication in for her on tomorrow. Patient thanked this nurse for the call.

## 2024-11-22 NOTE — TELEPHONE ENCOUNTER
Patient called today regarding pain medication. She wants a refill on her oxy. I explained to her that Dr. Yen is out of town. [phone]

## 2024-12-10 NOTE — PLAN OF CARE
Problem: Pain  Goal: Verbalizes/displays adequate comfort level or baseline comfort level  11/2/2024 0015 by Sobia Delgadillo RN  Outcome: Progressing  11/1/2024 1103 by ALIREZA Sousa RN  Outcome: Progressing     Problem: Safety - Adult  Goal: Free from fall injury  11/2/2024 0015 by Sobia Delgadillo RN  Outcome: Progressing  11/1/2024 1103 by ALIREZA Sousa RN  Outcome: Progressing     Problem: Discharge Planning  Goal: Discharge to home or other facility with appropriate resources  Recent Flowsheet Documentation  Taken 11/1/2024 1930 by Sobia Delgadillo RN  Discharge to home or other facility with appropriate resources: Identify barriers to discharge with patient and caregiver  11/1/2024 1103 by ALIREZA Sousa RN  Outcome: Progressing  Flowsheets (Taken 11/1/2024 0900)  Discharge to home or other facility with appropriate resources: Identify barriers to discharge with patient and caregiver      Re-ordered to pediatric neurology.

## 2025-01-07 DIAGNOSIS — G89.18 POST-OP PAIN: ICD-10-CM

## 2025-01-08 ENCOUNTER — TELEPHONE (OUTPATIENT)
Age: 75
End: 2025-01-08

## 2025-01-10 DIAGNOSIS — G89.18 POST-OP PAIN: Primary | ICD-10-CM

## 2025-01-10 RX ORDER — TRAMADOL HYDROCHLORIDE 50 MG/1
50 TABLET ORAL EVERY 8 HOURS PRN
Qty: 42 TABLET | Refills: 0 | Status: SHIPPED | OUTPATIENT
Start: 2025-01-10 | End: 2025-01-24

## 2025-01-27 RX ORDER — TRAMADOL HYDROCHLORIDE 50 MG/1
50 TABLET ORAL EVERY 8 HOURS PRN
Qty: 21 TABLET | Refills: 0 | Status: SHIPPED | OUTPATIENT
Start: 2025-01-27 | End: 2025-02-03

## 2025-02-13 ENCOUNTER — TELEPHONE (OUTPATIENT)
Age: 75
End: 2025-02-13

## 2025-02-13 ENCOUNTER — OFFICE VISIT (OUTPATIENT)
Age: 75
End: 2025-02-13
Payer: MEDICARE

## 2025-02-13 VITALS
WEIGHT: 156.5 LBS | DIASTOLIC BLOOD PRESSURE: 78 MMHG | HEART RATE: 99 BPM | HEIGHT: 59 IN | TEMPERATURE: 98.1 F | RESPIRATION RATE: 17 BRPM | BODY MASS INDEX: 31.55 KG/M2 | OXYGEN SATURATION: 93 % | SYSTOLIC BLOOD PRESSURE: 112 MMHG

## 2025-02-13 DIAGNOSIS — G89.18 POST-OP PAIN: Primary | ICD-10-CM

## 2025-02-13 PROCEDURE — 1123F ACP DISCUSS/DSCN MKR DOCD: CPT | Performed by: SURGERY

## 2025-02-13 PROCEDURE — 1126F AMNT PAIN NOTED NONE PRSNT: CPT | Performed by: SURGERY

## 2025-02-13 PROCEDURE — 99212 OFFICE O/P EST SF 10 MIN: CPT | Performed by: SURGERY

## 2025-02-13 ASSESSMENT — PATIENT HEALTH QUESTIONNAIRE - PHQ9
SUM OF ALL RESPONSES TO PHQ QUESTIONS 1-9: 0
2. FEELING DOWN, DEPRESSED OR HOPELESS: NOT AT ALL
SUM OF ALL RESPONSES TO PHQ9 QUESTIONS 1 & 2: 0
1. LITTLE INTEREST OR PLEASURE IN DOING THINGS: NOT AT ALL
SUM OF ALL RESPONSES TO PHQ QUESTIONS 1-9: 0

## 2025-02-13 NOTE — PROGRESS NOTES
Identified pt with two pt identifiers (name and ). Reviewed chart in preparation for visit and have obtained necessary documentation.    Britta Quevedo is a 74 y.o. female  Chief Complaint   Patient presents with    Follow-up     open colostomy reversal and ventral hernia repair     /78 (Site: Right Upper Arm, Position: Sitting, Cuff Size: Medium Adult)   Pulse 99   Temp 98.1 °F (36.7 °C) (Oral)   Resp 17   Ht 1.499 m (4' 11\")   Wt 71 kg (156 lb 8 oz)   SpO2 93%   BMI 31.61 kg/m²     1. Have you been to the ER, urgent care clinic since your last visit?  Hospitalized since your last visit?no    2. Have you seen or consulted any other health care providers outside of the [facility] since your last visit?  Include any pap smears or colon screening. no

## 2025-02-14 DIAGNOSIS — G89.18 POST-OP PAIN: Primary | ICD-10-CM

## 2025-02-14 RX ORDER — TRAMADOL HYDROCHLORIDE 50 MG/1
50 TABLET ORAL EVERY 4 HOURS PRN
Qty: 42 TABLET | Refills: 0 | Status: SHIPPED | OUTPATIENT
Start: 2025-02-14 | End: 2025-02-21

## 2025-02-18 NOTE — PROGRESS NOTES
General surgery history and Physical    Patient: Britta Quevedo  MRN: [id number]    YOB: 1950  Age: 74 y.o.  Sex: female     Chief Complaint:  Chief Complaint   Patient presents with    Follow-up     open colostomy reversal and ventral hernia repair       History of Present Illness: Britta Quevedo is a 74 y.o. very pleasant woman is here today for follow-up colostomy reversal.  She is doing very well.  We had a colostomy resemblance 3 months ago.  Her bowel was normal.  No signs of recurrence hernia.  No change in appetite.  Pain is moderate occasionally.    Social History:  Social Connections: Not on file       Past Medical History:  Past Medical History:   Diagnosis Date    Arthritis     Chronic kidney disease     Depression     Endometriosis     History of blood transfusion     Hyperlipemia     Prolonged emergence from general anesthesia      Surgical History:  Past Surgical History:   Procedure Laterality Date    BACK SURGERY      April 2023    CHOLECYSTECTOMY      COLONOSCOPY      COLONOSCOPY N/A 04/22/2024    COLONOSCOPY performed by Julian Pratt MD at [facility] ENDOSCOPY    COLONOSCOPY N/A 10/16/2024    DIAGNOSTIC COLONOSCOPY performed by Berto Yen MD at [facility] ENDOSCOPY    COLONOSCOPY N/A 10/16/2024    COLONOSCOPY BIOPSY performed by Berto Yen MD at [facility] ENDOSCOPY    HYSTERECTOMY (CERVIX STATUS UNKNOWN)      LAPAROSCOPY N/A 04/22/2024    LAPAROSCOPY DIAGNOSTIC, PARTIAL SIGMOID COLECTOMY WITH COLOSTOMY CREATION performed by Berto Yen MD at [facility] MAIN OR    SMALL INTESTINE SURGERY N/A 10/30/2024    OPEN COLOSTOMY REVERSAL AND VENTRAL HERNIA REPAIR, appendectomy #1 exploratory laparotomy. 2.  Colostomy reversal with EEA 29 mm device. 3.  Right flank abdominal wall ventral hernia repair with 4 x 6 inch ventral light mesh. 4.  Lysis radiation. #5 interval appendectomy performed by Berto Yen MD at [facility] MAIN OR    TONSILLECTOMY         Allergies:  Allergies   Allergen Reactions

## 2025-04-10 ENCOUNTER — HOSPITAL ENCOUNTER (OUTPATIENT)
Facility: HOSPITAL | Age: 75
Discharge: HOME | End: 2025-04-13
Payer: MEDICARE

## 2025-04-10 DIAGNOSIS — Z12.31 VISIT FOR SCREENING MAMMOGRAM: ICD-10-CM

## 2025-04-10 PROCEDURE — 77067 SCR MAMMO BI INCL CAD: CPT

## 2025-05-06 DIAGNOSIS — M79.606 PAIN OF LOWER EXTREMITY, UNSPECIFIED LATERALITY: ICD-10-CM

## 2025-05-06 RX ORDER — TRAMADOL HYDROCHLORIDE 50 MG/1
TABLET ORAL
Qty: 21 TABLET | Refills: 0 | OUTPATIENT
Start: 2025-05-06

## 2025-05-12 ENCOUNTER — TELEPHONE (OUTPATIENT)
Age: 75
End: 2025-05-12

## 2025-05-12 RX ORDER — CIPROFLOXACIN 250 MG/1
250 TABLET, FILM COATED ORAL 2 TIMES DAILY
Qty: 20 TABLET | Refills: 2 | Status: SHIPPED | OUTPATIENT
Start: 2025-05-12 | End: 2025-05-22

## 2025-05-12 RX ORDER — METRONIDAZOLE 500 MG/1
500 TABLET ORAL 3 TIMES DAILY
Qty: 30 TABLET | Refills: 2 | Status: SHIPPED | OUTPATIENT
Start: 2025-05-12 | End: 2025-05-22

## 2025-05-12 NOTE — TELEPHONE ENCOUNTER
Patient called in asking if Dr. Yen can call in a antibiotic for her Diverticulitis. Patient states that she has had a flare up noting else just a flare up and would like an antibiotic. Please contact patient at [phone]. Thank you -DCR

## 2025-05-12 NOTE — TELEPHONE ENCOUNTER
I spoke with  he said to send in Cipro 250 BID for 10 days with 2 refills.     He also said to send in Flagyl 500 MG TID for 10 days with 2 refills.     I called and left message for Britta Quevedo to let her know the medication has been sent.

## 2025-07-07 ENCOUNTER — APPOINTMENT (OUTPATIENT)
Facility: HOSPITAL | Age: 75
End: 2025-07-07
Payer: MEDICARE

## 2025-07-07 ENCOUNTER — HOSPITAL ENCOUNTER (EMERGENCY)
Facility: HOSPITAL | Age: 75
Discharge: HOME | End: 2025-07-07
Attending: EMERGENCY MEDICINE
Payer: MEDICARE

## 2025-07-07 VITALS
DIASTOLIC BLOOD PRESSURE: 83 MMHG | TEMPERATURE: 98.6 F | WEIGHT: 156 LBS | RESPIRATION RATE: 16 BRPM | BODY MASS INDEX: 31.45 KG/M2 | SYSTOLIC BLOOD PRESSURE: 116 MMHG | HEART RATE: 79 BPM | HEIGHT: 59 IN | OXYGEN SATURATION: 100 %

## 2025-07-07 DIAGNOSIS — S00.83XA CONTUSION OF FACE, INITIAL ENCOUNTER: ICD-10-CM

## 2025-07-07 DIAGNOSIS — M54.50 LOW BACK PAIN, UNSPECIFIED BACK PAIN LATERALITY, UNSPECIFIED CHRONICITY, UNSPECIFIED WHETHER SCIATICA PRESENT: ICD-10-CM
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
## 2025-07-07 PROCEDURE — 99284 EMERGENCY DEPT VISIT MOD MDM: CPT

## 2025-07-07 PROCEDURE — 73562 X-RAY EXAM OF KNEE 3: CPT

## 2025-07-07 PROCEDURE — 72125 CT NECK SPINE W/O DYE: CPT

## 2025-07-07 PROCEDURE — 6370000000 HC RX 637 (ALT 250 FOR IP)

## 2025-07-07 PROCEDURE — 70450 CT HEAD/BRAIN W/O DYE: CPT

## 2025-07-07 RX ORDER — ACETAMINOPHEN 500 MG
1000 TABLET ORAL
Status: COMPLETED | OUTPATIENT
Start: 2025-07-07 | End: 2025-07-07

## 2025-07-07 RX ORDER — LIDOCAINE 4 G/G
1 PATCH TOPICAL DAILY
Qty: 30 PATCH | Refills: 0 | Status: SHIPPED | OUTPATIENT
Start: 2025-07-07 | End: 2025-08-06

## 2025-07-07 RX ORDER — LIDOCAINE 4 G/G
1 PATCH TOPICAL
Status: DISCONTINUED | OUTPATIENT
Start: 2025-07-07 | End: 2025-07-07 | Stop reason: HOSPADM

## 2025-07-07 RX ADMIN — ACETAMINOPHEN 1000 MG: 500 TABLET ORAL at 15:15

## 2025-07-07 ASSESSMENT — PAIN DESCRIPTION - DESCRIPTORS
DESCRIPTORS: ACHING
DESCRIPTORS: SORE

## 2025-07-07 ASSESSMENT — PAIN DESCRIPTION - ORIENTATION: ORIENTATION: RIGHT

## 2025-07-07 ASSESSMENT — PAIN SCALES - GENERAL
PAINLEVEL_OUTOF10: 6
PAINLEVEL_OUTOF10: 8
PAINLEVEL_OUTOF10: 9

## 2025-07-07 ASSESSMENT — PAIN - FUNCTIONAL ASSESSMENT: PAIN_FUNCTIONAL_ASSESSMENT: 0-10

## 2025-07-07 ASSESSMENT — PAIN DESCRIPTION - LOCATION
LOCATION: FACE;BACK
LOCATION: FACE

## 2025-07-07 NOTE — DISCHARGE INSTRUCTIONS
We discussed your results today. It is important for you to follow up with your primary care for further evaluation and management. Return to the emergency department for worsening symptoms.

## 2025-07-07 NOTE — ED PROVIDER NOTES
[facility] EMERGENCY DEPARTMENT  EMERGENCY DEPARTMENT ENCOUNTER      Pt Name: Britta Quevedo  MRN: [id number]  Birthdate 1950  Date of evaluation: 7/7/2025  Provider: Cam High PA-C    CHIEF COMPLAINT       Chief Complaint   Patient presents with    Fall         HISTORY OF PRESENT ILLNESS   (Location/Symptom, Timing/Onset, Context/Setting, Quality, Duration, Modifying Factors, Severity)  Note limiting factors.   Britta Quevedo is a 75 y.o. female who presents to the emergency department for ground-level fall that occurred earlier today.  Patient was walking into the hospital when she tripped causing her to land on her knees and hit her her head.  She denies any dizziness or lightheadedness prior to the fall.  She reports right knee pain greater than left.  Denies any loss of consciousness, vomiting, headache, vision changes or weakness.       The history is provided by the patient.         Review of External Medical Records:     Nursing Notes were reviewed.    REVIEW OF SYSTEMS    (2-9 systems for level 4, 10 or more for level 5)     Review of Systems    Except as noted above the remainder of the review of systems was reviewed and negative.       PAST MEDICAL HISTORY     Past Medical History:   Diagnosis Date    Arthritis     Chronic kidney disease     Depression     Endometriosis     History of blood transfusion     Hyperlipemia     Prolonged emergence from general anesthesia          SURGICAL HISTORY       Past Surgical History:   Procedure Laterality Date    BACK SURGERY      April 2023    CHOLECYSTECTOMY      COLONOSCOPY      COLONOSCOPY N/A 04/22/2024    COLONOSCOPY performed by Julian Pratt MD at [facility] ENDOSCOPY    COLONOSCOPY N/A 10/16/2024    DIAGNOSTIC COLONOSCOPY performed by Berto Yen MD at [facility] ENDOSCOPY    COLONOSCOPY N/A 10/16/2024    COLONOSCOPY BIOPSY performed by Berto Yen MD at [facility] ENDOSCOPY    HYSTERECTOMY (CERVIX STATUS UNKNOWN)      LAPAROSCOPY N/A

## 2025-07-07 NOTE — ED TRIAGE NOTES
Patient was the visitor emergency from falling in parking lot, patient is alert and oriented x 4, no active bleeding in triage.     Patient to ER triage via wheelchair for complaints of mechanical GLF while getting out of car.     Reports hitting head, left face, and right knee pain.     Denies LOC. Denies blood thinners.

## (undated) DEVICE — SUTURE VICRYL + SZ 3-0 L27IN ABSRB UD L26MM SH 1/2 CIR VCP416H

## (undated) DEVICE — DRAIN SURG W7XL20CM SIL SMOOTH FLAT 3/4 PERF DBL WRP

## (undated) DEVICE — GLOVE ORANGE PI 7 1/2   MSG9075

## (undated) DEVICE — LARGE VISCERA RETAINER: Brand: VISCERA RETAINER, FISH

## (undated) DEVICE — MASTISOL ADHESIVE LIQ 2/3ML

## (undated) DEVICE — 1LYRTR 16FR10ML100%SIL UMS SNP: Brand: MEDLINE INDUSTRIES, INC.

## (undated) DEVICE — GARMENT,MEDLINE,DVT,INT,CALF,MED, GEN2: Brand: MEDLINE

## (undated) DEVICE — STAPLER INT L75MM CUT LN L73MM STPL LN L77MM BLU B FRM 8

## (undated) DEVICE — STAPLER EXT 65MM S STL AUTO DISP PURSTRING

## (undated) DEVICE — SUTURE PDS II SZ 0 L60IN ABSRB VLT L48MM CTX 1/2 CIR Z990G

## (undated) DEVICE — SOUTHSIDE TURNOVER: Brand: MEDLINE INDUSTRIES, INC.

## (undated) DEVICE — TUBING INSUFFLATOR HEAT HUMIDIFIED SMK EVAC SET PNEUMOCLEAR

## (undated) DEVICE — COLON CLOSING PACK: Brand: MEDLINE INDUSTRIES, INC.

## (undated) DEVICE — SPONGE LAPAROTOMY W18XL18IN WHITE STRUNG RADIOPAQUE STERILE

## (undated) DEVICE — STAPLER SKIN H3.9MM WIRE DIA0.58MM CRWN 6.9MM 35 STPL ROT

## (undated) DEVICE — MASK O2 MED AD 7 FT 3 IN 1 W/ STD CONN LTX

## (undated) DEVICE — GOWN,SLEEVE,STERILE,W/CSR WRAP,1/P: Brand: MEDLINE

## (undated) DEVICE — APPLIER CLP M/L SHFT DIA5MM 15 LIG LIGAMAX 5

## (undated) DEVICE — 3M™ SURGICAL CLIPPER WITH PIVOTING HEAD, 9660, 50/CASE: Brand: 3M™

## (undated) DEVICE — TROCAR: Brand: KII FIOS FIRST ENTRY

## (undated) DEVICE — 3M™ STERI-STRIP™ REINFORCED ADHESIVE SKIN CLOSURES, R1547, 1/2 IN X 4 IN (12 MM X 100 MM), 6 STRIPS/ENVELOPE: Brand: 3M™ STERI-STRIP™

## (undated) DEVICE — Z DISCONTINUED USE 2218423 SUTURE ETHILON SZ 3-0 L18IN NONABSORBABLE BLK FS-1 L24MM 3/8 663H

## (undated) DEVICE — SUTURE VICRYL + SZ 0 L27IN ABSRB VLT L26MM UR-6 5/8 CIR VCP603H

## (undated) DEVICE — SOLUTION IRRIG 500ML 0.9% SOD CHLO USP POUR PLAS BTL

## (undated) DEVICE — HYPODERMIC SAFETY NEEDLE: Brand: MONOJECT

## (undated) DEVICE — CLIP LIG M BLU TI HRT SHP WIRE HORZ 24 CLIPS/PK 25PK/CA

## (undated) DEVICE — MASK ANES INF SZ 2 PREM TAIL VLV INFL PRT UNSCENTED SGL PT

## (undated) DEVICE — JELLY,LUBE,STERILE,FLIP TOP,TUBE,2-OZ: Brand: MEDLINE

## (undated) DEVICE — SUTURE PERMAHAND SZ 3-0 L18IN NONABSORBABLE BLK L26MM SH C013D

## (undated) DEVICE — BLADE ES L6IN ELASTOMERIC COAT EXT DURABLE BEND UPTO 90DEG

## (undated) DEVICE — RELOAD STPL L60MM H1.5-3.6MM REG TISS BLU GRIPPING SURF B

## (undated) DEVICE — STAPLER INT L28CM DIA29MM CLS STPL H10-2.5MM OPN LEG L5.5MM

## (undated) DEVICE — TROCAR: Brand: KII® SLEEVE

## (undated) DEVICE — KIT,ANTI FOG,W/SPONGE & FLUID,SOFT PACK: Brand: MEDLINE

## (undated) DEVICE — FORCEPS BX L240CM JAW DIA2.4MM ORNG L CAP W/ NDL DISP RAD

## (undated) DEVICE — TIP SUCT POOLE RIG 2-PART L LUMN BVL SL OPN AND SNAP FIT

## (undated) DEVICE — DRAIN SURG W10XL20CM SIL SMOOTH FLAT 3/4 PERF DBL WRP

## (undated) DEVICE — STAPLER INT L34CM 60MM LNG ENDOSCP ARTC PWR + ECHELON FLX

## (undated) DEVICE — YANKAUER,BULB TIP,W/O VENT,RIGID,STERILE: Brand: MEDLINE

## (undated) DEVICE — SUTURE MONOCRYL + SZ 4-0 L27IN ABSRB UD L19MM PS-2 3/8 CIR MCP426H

## (undated) DEVICE — Device

## (undated) DEVICE — FORCEPS BX L240CM JAW DIA2.8MM L CAP W/ NDL MIC MESH TOOTH

## (undated) DEVICE — BLADE,CARBON-STEEL,15,STRL,DISPOSABLE,TB: Brand: MEDLINE

## (undated) DEVICE — SUTURE VICRYL + SZ 0 L27IN ANTIBACTERIAL POLYGLACTIN 910 W VCP280H

## (undated) DEVICE — SUTURE VICRYL + SZ 0 L27IN ABSRB UD L36MM CT-1 1/2 CIR VCPP41D

## (undated) DEVICE — SPONGE LAP SFT 18X18 IN X RAY DETECTABLE

## (undated) DEVICE — GAUZE,PACKING STRIP,IODOFORM,1"X5YD,STRL: Brand: CURAD

## (undated) DEVICE — SUTURE ETHILON SZ 3-0 L18IN NONABSORBABLE BLK PS-2 L19MM 3/8 1669H

## (undated) DEVICE — MAJOR LAP PROCEDURE: Brand: MEDLINE INDUSTRIES, INC.

## (undated) DEVICE — BLADE,CARBON-STEEL,10,STRL,DISPOSABLE,TB: Brand: MEDLINE

## (undated) DEVICE — LINE SAMP LNG AD ORAL NSL PT O2 TBNG SMRT CAPNOLN H +

## (undated) DEVICE — KIT PRECLEANING BS ENDOSCP

## (undated) DEVICE — APPLIER LIG CLP M L11IN TI STR RNG HNDL FOR 20 CLP DISP

## (undated) DEVICE — LAPAROSCOPIC SCISSORS: Brand: EPIX LAPAROSCOPIC SCISSORS

## (undated) DEVICE — CANNULA NSL O2 AD 7 FT END-TIDAL CARBON DIOX VENTFLO

## (undated) DEVICE — LAPAROSCOPIC CHOLE PACK: Brand: MEDLINE INDUSTRIES, INC.

## (undated) DEVICE — SUTURE PERMAHAND SZ 2-0 L30IN NONABSORBABLE BLK SH L26MM C016D

## (undated) DEVICE — FORCEPS BX L240CM JAW DIA2.2MM RAD JAW 4 HOT DISP

## (undated) DEVICE — SOLUTION ANTIFOG VIS SYS CLEARIFY LAPSCP

## (undated) DEVICE — TOWEL SURG OR 26X17 IN MSTR TAGGED XR W/ LOOP WHT

## (undated) DEVICE — TROCARS: Brand: KII® BALLOON BLUNT TIP SYSTEM

## (undated) DEVICE — ENDO KIT 1: Brand: MEDLINE INDUSTRIES, INC.

## (undated) DEVICE — SEALER TISS L20CM DIA13MM ADV BPLR L CRV JAW OPN APPRCH

## (undated) DEVICE — SUTURE PDS II SZ 0 L27IN ABSRB VLT L26MM CT-2 1/2 CIR Z334H

## (undated) DEVICE — SEALER LAP L37CM MARYLAND JAW OPN NANO COAT MULTIFUNCTIONAL

## (undated) DEVICE — SOLUTION IRRIG 1000ML 0.9% SOD CHL USP POUR PLAS BTL

## (undated) DEVICE — RELOAD STPL L75MM OPN H3.8MM CLS 1.5MM WIRE DIA0.2MM REG

## (undated) DEVICE — THE STERILE LIGHT HANDLE COVER IS USED WITH STERIS SURGICAL LIGHTING AND VISUALIZATION SYSTEMS.

## (undated) DEVICE — SUTURE PROL SZ 3-0 L18IN NONABSORBABLE BLU L19MM FS-2 3/8 8665G

## (undated) DEVICE — SUTURE VICRYL + SZ 3-0 L18IN ABSRB UD SH 1/2 CIR TAPERCUT NDL VCP864D

## (undated) DEVICE — SOLUTION IV 1000ML 0.9% SOD CHL PH 5 INJ USP VIAFLX PLAS

## (undated) DEVICE — BLOCK BITE STD GRN ORAL AD W/O STRP SLD PLAS DISP BITEGARD